# Patient Record
Sex: FEMALE | Race: WHITE | Employment: OTHER | URBAN - METROPOLITAN AREA
[De-identification: names, ages, dates, MRNs, and addresses within clinical notes are randomized per-mention and may not be internally consistent; named-entity substitution may affect disease eponyms.]

---

## 2017-03-16 ENCOUNTER — TELEPHONE (OUTPATIENT)
Dept: OTHER | Facility: CLINIC | Age: 72
End: 2017-03-16

## 2017-03-16 ENCOUNTER — OFFICE VISIT (OUTPATIENT)
Dept: FAMILY MEDICINE | Facility: CLINIC | Age: 72
End: 2017-03-16
Payer: COMMERCIAL

## 2017-03-16 VITALS
SYSTOLIC BLOOD PRESSURE: 110 MMHG | HEIGHT: 66 IN | OXYGEN SATURATION: 99 % | DIASTOLIC BLOOD PRESSURE: 70 MMHG | BODY MASS INDEX: 22.5 KG/M2 | HEART RATE: 62 BPM | WEIGHT: 140 LBS | TEMPERATURE: 98.4 F

## 2017-03-16 DIAGNOSIS — I71.40 ABDOMINAL AORTIC ANEURYSM (AAA) WITHOUT RUPTURE (H): Primary | ICD-10-CM

## 2017-03-16 DIAGNOSIS — K13.0 CRACKED LIPS: ICD-10-CM

## 2017-03-16 DIAGNOSIS — H26.9 CATARACT: ICD-10-CM

## 2017-03-16 DIAGNOSIS — Z12.31 ENCOUNTER FOR SCREENING MAMMOGRAM FOR BREAST CANCER: ICD-10-CM

## 2017-03-16 DIAGNOSIS — Z78.0 ASYMPTOMATIC POSTMENOPAUSAL STATUS: ICD-10-CM

## 2017-03-16 PROCEDURE — 99214 OFFICE O/P EST MOD 30 MIN: CPT | Performed by: FAMILY MEDICINE

## 2017-03-16 RX ORDER — DIAPER,BRIEF,INFANT-TODD,DISP
EACH MISCELLANEOUS
Qty: 25 G | Refills: 0 | Status: SHIPPED | OUTPATIENT
Start: 2017-03-16 | End: 2017-04-28

## 2017-03-16 NOTE — TELEPHONE ENCOUNTER
Scheduled consult appt for pt for Friday April 7th at 9:30am with Dr. Jerome. AAA U/S first at 9:30am then see Dr. Jerome at 10:30am. Pt is in agreement with appt and had no further questions. Mailed pt this information.

## 2017-03-16 NOTE — MR AVS SNAPSHOT
After Visit Summary   3/16/2017    Trish Wu    MRN: [id number]           Patient Information     Date Of Birth          1945        Visit Information        Provider Department      3/16/2017 10:00 AM Jermain Arcos [place] [facility] [place]        Today's Diagnoses     Abdominal aortic aneurysm (AAA) without rupture (H)    -  1    Cracked lips        Cataract        Encounter for screening mammogram for breast cancer        Asymptomatic postmenopausal status           Follow-ups after your visit        Additional Services     OPHTHALMOLOGY ADULT REFERRAL       Your provider has referred you to: N: [facility] [place] [phone] [email or web address].   Anali Haji     Please be aware that coverage of these services is subject to the terms and limitations of your health insurance plan.  Call member services at your health plan with any benefit or coverage questions.      Please bring the following with you to your appointment:    (1) Any X-Rays, CTs or MRIs which have been performed.  Contact the facility where they were done to arrange for  prior to your scheduled appointment.    (2) List of current medications  (3) This referral request   (4) Any documents/labs given to you for this referral            VASCULAR SURGERY REFERRAL       Your provider has referred you to: **Vascular  Services [phone] - Abdominal Aortic Aneurysm - Symptomatic    [email or web address]    Please be aware that coverage of these services is subject to the terms and limitations of your health insurance plan.  Call member services at your health plan with any benefit or coverage questions.      Please bring the following with you to your appointment:    (1) Any X-Rays, CTs or MRIs which have been performed.  Contact the facility where they were done to arrange for  prior to your scheduled appointment.    (2)  "List of current medications   (3) This referral request   (4) Any documents/labs given to you for this referral                  Future tests that were ordered for you today     Open Future Orders        Priority Expected Expires Ordered    MA Screen with Implants Bilateral w/Jonny Routine  3/16/2018 3/16/2017    DX Hip/Pelvis/Spine Routine  3/16/2018 3/16/2017    US Aortic Imaging Routine  3/16/2018 3/16/2017            Who to contact     If you have questions or need follow up information about today's clinic visit or your schedule please contact [facility] [place] directly at [phone].  Normal or non-critical lab and imaging results will be communicated to you by Vision Criticalhart, letter or phone within 4 business days after the clinic has received the results. If you do not hear from us within 7 days, please contact the clinic through Vision Criticalhart or phone. If you have a critical or abnormal lab result, we will notify you by phone as soon as possible.  Submit refill requests through [email or web address] or call your pharmacy and they will forward the refill request to us. Please allow 3 business days for your refill to be completed.          Additional Information About Your Visit        Vision Criticalhart Information     [email or web address] gives you secure access to your electronic health record. If you see a primary care provider, you can also send messages to your care team and make appointments. If you have questions, please call your primary care clinic.  If you do not have a primary care provider, please call [phone] and they will assist you.        Care EveryWhere ID     This is your Care EveryWhere ID. This could be used by other organizations to access your [place] medical records  [id number]        Your Vitals Were     Pulse Temperature Height Pulse Oximetry BMI (Body Mass Index)       62 98.4  F (36.9  C) (Tympanic) 5' 6\" (1.676 m) 99% 22.6 kg/m2        Blood Pressure from Last 3 Encounters:   03/16/17 110/70   08/16/16 148/82 "   08/16/16 110/78    Weight from Last 3 Encounters:   03/16/17 140 lb (63.5 kg)   08/16/16 144 lb (65.3 kg)   06/14/16 133 lb 6.4 oz (60.5 kg)              We Performed the Following     OPHTHALMOLOGY ADULT REFERRAL     VASCULAR SURGERY REFERRAL          Today's Medication Changes          These changes are accurate as of: 3/16/17 10:23 AM.  If you have any questions, ask your nurse or doctor.               Start taking these medicines.        Dose/Directions    hydrocortisone 1 % ointment   Used for:  Cracked lips   Started by:  Jermain Arcos MD        Apply sparingly to affected area three times daily as needed   Quantity:  25 g   Refills:  0         Stop taking these medicines if you haven't already. Please contact your care team if you have questions.     diclofenac 1 % Gel topical gel   Commonly known as:  VOLTAREN   Stopped by:  Jermain Arcos MD           ibuprofen 800 MG tablet   Commonly known as:  ADVIL/MOTRIN   Stopped by:  Jermain Arcos MD                Where to get your medicines      These medications were sent to [facility] [address] [place]  [address] [facility]  [address] [facility] [address] [address]    Hours:  Added 10/26 CK Checked with pharmacy Phone:  [phone]     hydrocortisone 1 % ointment                Primary Care Provider Office Phone # Fax #    Jermain Arcos [phone]       [address]  [place] [address]        Thank you!     Thank you for [place] [facility]  [place]. Our goal is always to provide you with excellent care. Hearing back from our patients is one way we can continue to improve our services. Please take a few minutes to complete the written survey that you may receive in the mail after your visit with us. Thank you!             Your Updated Medication List - Protect others around you: Learn how to safely use, store and throw away your medicines at  www.disposemymeds.org.          This list is accurate as of: 3/16/17 10:23 AM.  Always use your most recent med list.                   Brand Name Dispense Instructions for use    cetirizine 10 MG tablet    zyrTEC     Take 1 tablet (10 mg) by mouth every evening       hydrocortisone 1 % ointment     25 g    Apply sparingly to affected area three times daily as needed       lisinopril-hydrochlorothiazide 20-25 MG per tablet    PRINZIDE/ZESTORETIC    90 tablet    Take 1 tablet by mouth daily       montelukast 10 MG tablet    SINGULAIR    90 tablet    Take 1 tablet (10 mg) by mouth At Bedtime       NASONEX 50 MCG/ACT spray   Generic drug:  mometasone      Spray 2 sprays into both nostrils daily

## 2017-03-16 NOTE — TELEPHONE ENCOUNTER
Pt referred to [facility] by  for AAA.    Pt needs to be scheduled for AAA US (has been ordered by PCP already, needs to be done prior to consult with [facility]) and consult with Vascular Surgeon or IR.  Will route to scheduling to coordinate an appointment next available.    Elinor Ahston RN BSN

## 2017-03-16 NOTE — NURSING NOTE
"Chief Complaint   Patient presents with     Mouth Lesions     Referral     eye Dr     Abdominal Pain     Abdominal aortic aneurysm without rupture        Initial /70 (BP Location: Right arm, Patient Position: Chair, Cuff Size: Adult Regular)  Pulse 62  Temp 98.4  F (36.9  C) (Tympanic)  Ht 5' 6\" (1.676 m)  Wt 140 lb (63.5 kg)  SpO2 99%  BMI 22.6 kg/m2 Estimated body mass index is 22.6 kg/(m^2) as calculated from the following:    Height as of this encounter: 5' 6\" (1.676 m).    Weight as of this encounter: 140 lb (63.5 kg).  Medication Reconciliation: complete  "

## 2017-03-16 NOTE — PROGRESS NOTES
SUBJECTIVE:                                                    Trish Wu is a 72 year old female who presents to clinic today for the following health issues:      Concern - Referral for eye drPillo Patient  reports that she had an eye cataract surgery done many years ago. It's getting hazy. Needs a referral for to be seen.        Concern - cracked lips - for the last 5 months off and on. Lips gets very dry and cracked and is often bleed. Continues to moisturize it but it does not help. She thinks it got worse last month when she ate shrimp. She is not allergic to shrimp as she has had it in the past.        Concern - Abdominal aortic aneurysm -infrarenal noted at 4 cm size, August 2016. Patient worries that it might get bigger and ruptures. She reports of some lower back pain. Would like to get it looked at again and meet with the surgeon to discuss the options of management          Problem list and histories reviewed & adjusted, as indicated.  Additional history: as documented    Patient Active Problem List   Diagnosis     Essential hypertension with goal blood pressure less than 140/90     Environmental allergies     Hip pain, left     Osteopenia     Aortic aneurysm (H)     Past Surgical History   Procedure Laterality Date     Hysterectomy total abdominal  1966     Vagotomy, pyloroplasty, combined       Cataract iol, rt/lt       Mammoplasty augmentation         Social History   Substance Use Topics     Smoking status: Former Smoker     Smokeless tobacco: Never Used     Alcohol use 0.0 oz/week     0 Standard drinks or equivalent per week      Comment: rare     Family History   Problem Relation Age of Onset     OSTEOPOROSIS Mother      Type 2 Diabetes Son          Current Outpatient Prescriptions   Medication Sig Dispense Refill     hydrocortisone 1 % ointment Apply sparingly to affected area three times daily as needed 25 g 0     mometasone (NASONEX) 50 MCG/ACT nasal spray Spray 2 sprays into both nostrils  "daily       cetirizine (ZYRTEC) 10 MG tablet Take 1 tablet (10 mg) by mouth every evening       lisinopril-hydrochlorothiazide (PRINZIDE,ZESTORETIC) 20-25 MG per tablet Take 1 tablet by mouth daily 90 tablet 3     montelukast (SINGULAIR) 10 MG tablet Take 1 tablet (10 mg) by mouth At Bedtime 90 tablet 3     Allergies   Allergen Reactions     Penicillins Hives       Reviewed and updated as needed this visit by clinical staff       Reviewed and updated as needed this visit by Provider         ROS:  C: NEGATIVE for fever, chills, change in weight  E/M: NEGATIVE for ear, mouth and throat problems  R: NEGATIVE for significant cough or SOB  CV: NEGATIVE for chest pain, palpitations or peripheral edema    OBJECTIVE:                                                    /70 (BP Location: Right arm, Patient Position: Chair, Cuff Size: Adult Regular)  Pulse 62  Temp 98.4  F (36.9  C) (Tympanic)  Ht 5' 6\" (1.676 m)  Wt 140 lb (63.5 kg)  SpO2 99%  BMI 22.6 kg/m2  Body mass index is 22.6 kg/(m^2).   GENERAL: healthy, alert, well nourished, well hydrated, no distress  HENT: ear canals- normal; TMs- normal; Nose- normal; Mouth- no ulcers, no lesions  NECK: no tenderness, no adenopathy, no asymmetry, no masses, no stiffness; thyroid- normal to palpation  RESP: lungs clear to auscultation - no rales, no rhonchi, no wheezes  CV: regular rates and rhythm, normal S1 S2, no S3 or S4 and no murmur, no click or rub -  ABDOMEN: soft, no tenderness, no  hepatosplenomegaly, no masses, normal bowel sounds         ASSESSMENT/PLAN:                                                      1. Abdominal aortic aneurysm (AAA) without rupture (H)  Patient currently is symptomatic with mild low back pain which is been present for the past few months. She is hemodynamically stable. Recommended to get the carotic ultrasound done as soon as possible followed by seeing a vascular surgeon. Instructed to go to the emergency room if any symptomatic worsening " noted. Patient verbalizes understanding and agreement    - US Aortic Imaging; Future  - VASCULAR SURGERY REFERRAL    2. Cracked lips  Recommended a trial of hydrocortisone ointment and keeping her lips moisturized at all times.  - hydrocortisone 1 % ointment; Apply sparingly to affected area three times daily as needed  Dispense: 25 g; Refill: 0    3. Cataract  Referring the patient to ophthalmology  - OPHTHALMOLOGY ADULT REFERRAL    4. Encounter for screening mammogram for breast cancer  Screening mammogram ordered  - MA Screen with Implants Bilateral w/Jonny; Future    5. Asymptomatic postmenopausal status  Screening bone density scan ordered  - DX Hip/Pelvis/Spine; Future      Follow up with Provider - as needed     Jermain Arcos MD  [facility] [place]

## 2017-03-20 ENCOUNTER — HOSPITAL ENCOUNTER (OUTPATIENT)
Dept: MAMMOGRAPHY | Facility: CLINIC | Age: 72
End: 2017-03-20
Attending: FAMILY MEDICINE
Payer: COMMERCIAL

## 2017-03-20 ENCOUNTER — HOSPITAL ENCOUNTER (OUTPATIENT)
Dept: BONE DENSITY | Facility: CLINIC | Age: 72
Discharge: HOME | End: 2017-03-20
Attending: FAMILY MEDICINE | Admitting: FAMILY MEDICINE
Payer: COMMERCIAL

## 2017-03-20 DIAGNOSIS — Z12.31 ENCOUNTER FOR SCREENING MAMMOGRAM FOR BREAST CANCER: ICD-10-CM

## 2017-03-20 DIAGNOSIS — Z78.0 ASYMPTOMATIC POSTMENOPAUSAL STATUS: ICD-10-CM

## 2017-03-20 PROCEDURE — 77080 DXA BONE DENSITY AXIAL: CPT

## 2017-03-20 PROCEDURE — G0202 SCR MAMMO BI INCL CAD: HCPCS

## 2017-04-07 ENCOUNTER — OFFICE VISIT (OUTPATIENT)
Dept: OTHER | Facility: CLINIC | Age: 72
End: 2017-04-07
Attending: FAMILY MEDICINE
Payer: COMMERCIAL

## 2017-04-07 ENCOUNTER — HOSPITAL ENCOUNTER (OUTPATIENT)
Dept: ULTRASOUND IMAGING | Facility: CLINIC | Age: 72
Discharge: HOME | End: 2017-04-07
Attending: FAMILY MEDICINE | Admitting: FAMILY MEDICINE
Payer: COMMERCIAL

## 2017-04-07 VITALS — HEART RATE: 54 BPM | DIASTOLIC BLOOD PRESSURE: 61 MMHG | SYSTOLIC BLOOD PRESSURE: 125 MMHG

## 2017-04-07 DIAGNOSIS — I71.40 ABDOMINAL AORTIC ANEURYSM (AAA) WITHOUT RUPTURE (H): Primary | ICD-10-CM

## 2017-04-07 DIAGNOSIS — I71.40 ABDOMINAL AORTIC ANEURYSM (AAA) WITHOUT RUPTURE (H): ICD-10-CM

## 2017-04-07 PROCEDURE — 99211 OFF/OP EST MAY X REQ PHY/QHP: CPT

## 2017-04-07 PROCEDURE — 76775 US EXAM ABDO BACK WALL LIM: CPT

## 2017-04-07 PROCEDURE — 99204 OFFICE O/P NEW MOD 45 MIN: CPT | Mod: ZP | Performed by: SURGERY

## 2017-04-07 NOTE — PROGRESS NOTES
71 y/o female with AAA maximum diameter 4.3 cm on U/S.  Asymptomatic.  Long discussion with regard to etiology, operative repair, risks, goals etc..  Palpable pedal pulses bilaterally, carotids-4/4 no bruits, small AAA just left of midline.  Plan at this point is check a CTA in one year to monitor growth as well as evaluate for a endovascular repair.  She spends 6 months in [place] every year and is several hours away from medical care.  I would anticipate repair as she approaches 5.0 cm because of this.  Discussed all in detail with the patient she appears to uiderstand and wishes to proceed in this way.  Face to face time 45 minutes with greater than 50% in consultation.

## 2017-04-07 NOTE — MR AVS SNAPSHOT
After Visit Summary   4/7/2017    Trish Wu    MRN: [id number]           Patient Information     Date Of Birth          1945        Visit Information        Provider Department      4/7/2017 10:30 AM Ulisses Meraz MD [facility]      Today's Diagnoses     Abdominal aortic aneurysm (AAA) without rupture (H)    -  1       Follow-ups after your visit        Who to contact     If you have questions or need follow up information about today's clinic visit or your schedule please contact [facility] directly at [phone].  Normal or non-critical lab and imaging results will be communicated to you by MyChart, letter or phone within 4 business days after the clinic has received the results. If you do not hear from us within 7 days, please contact the clinic through XimoXihart or phone. If you have a critical or abnormal lab result, we will notify you by phone as soon as possible.  Submit refill requests through IntelliMat or call your pharmacy and they will forward the refill request to us. Please allow 3 business days for your refill to be completed.          Additional Information About Your Visit        MyChart Information     IntelliMat gives you secure access to your electronic health record. If you see a primary care provider, you can also send messages to your care team and make appointments. If you have questions, please call your primary care clinic.  If you do not have a primary care provider, please call [phone] and they will assist you.        Care EveryWhere ID     This is your Care EveryWhere ID. This could be used by other organizations to access your [place] medical records  [id number]        Your Vitals Were     Pulse Breastfeeding?                54 No           Blood Pressure from Last 3 Encounters:   04/07/17 125/61   03/16/17 110/70   08/16/16 148/82    Weight from Last 3  Encounters:   03/16/17 140 lb (63.5 kg)   08/16/16 144 lb (65.3 kg)   06/14/16 133 lb 6.4 oz (60.5 kg)              Today, you had the following     No orders found for display       Primary Care Provider Office Phone # Fax #    Jermain Arcos [phone]       [facility] [place] [address]  [place] [address]        Thank you!     Thank you for choosing [facility] [place]  for your care. Our goal is always to provide you with excellent care. Hearing back from our patients is one way we can continue to improve our services. Please take a few minutes to complete the written survey that you may receive in the mail after your visit with us. Thank you!             Your Updated Medication List - Protect others around you: Learn how to safely use, store and throw away your medicines at www.disposemymeds.org.          This list is accurate as of: 4/7/17 11:02 AM.  Always use your most recent med list.                   Brand Name Dispense Instructions for use    cetirizine 10 MG tablet    zyrTEC     Take 1 tablet (10 mg) by mouth every evening       hydrocortisone 1 % ointment     25 g    Apply sparingly to affected area three times daily as needed       lisinopril-hydrochlorothiazide 20-25 MG per tablet    PRINZIDE/ZESTORETIC    90 tablet    Take 1 tablet by mouth daily       montelukast 10 MG tablet    SINGULAIR    90 tablet    Take 1 tablet (10 mg) by mouth At Bedtime       NASONEX 50 MCG/ACT spray   Generic drug:  mometasone      Spray 2 sprays into both nostrils daily

## 2017-04-07 NOTE — NURSING NOTE
"Chief Complaint   Patient presents with     RECHECK     AAA (9:30 [facility] 10:30 S)*New consult for AAA referred by Dr. Arcos, CT & records in Epic       Initial /61 (BP Location: Right arm, Patient Position: Chair, Cuff Size: Adult Regular)  Pulse 54  Breastfeeding? No Estimated body mass index is 22.6 kg/(m^2) as calculated from the following:    Height as of 3/16/17: 5' 6\" (1.676 m).    Weight as of 3/16/17: 140 lb (63.5 kg).  Medication Reconciliation: complete     Face to face nursing time: 8 minutes    Krystal Weinstein MA     "

## 2017-04-14 ENCOUNTER — OFFICE VISIT (OUTPATIENT)
Dept: FAMILY MEDICINE | Facility: CLINIC | Age: 72
End: 2017-04-14
Payer: COMMERCIAL

## 2017-04-14 VITALS
HEART RATE: 66 BPM | TEMPERATURE: 97.4 F | HEIGHT: 66 IN | WEIGHT: 140.4 LBS | BODY MASS INDEX: 22.56 KG/M2 | DIASTOLIC BLOOD PRESSURE: 72 MMHG | SYSTOLIC BLOOD PRESSURE: 133 MMHG | OXYGEN SATURATION: 99 %

## 2017-04-14 DIAGNOSIS — M25.552 HIP PAIN, LEFT: ICD-10-CM

## 2017-04-14 DIAGNOSIS — K13.0 CRACKED LIPS: Primary | ICD-10-CM

## 2017-04-14 PROCEDURE — 99213 OFFICE O/P EST LOW 20 MIN: CPT | Performed by: FAMILY MEDICINE

## 2017-04-14 RX ORDER — MUPIROCIN 20 MG/G
OINTMENT TOPICAL 3 TIMES DAILY
Qty: 22 G | Refills: 0 | Status: SHIPPED | OUTPATIENT
Start: 2017-04-14 | End: 2017-04-21

## 2017-04-14 NOTE — PROGRESS NOTES
SUBJECTIVE:                                                    Trish Wu is a 72 year old female who presents to clinic today for the following health issues:      Concern - blisters on lips top and bottom     Onset: 5 months     Description:   Blisters on lips top and bottom. Painful     Intensity: moderate    Progression of Symptoms:  worsening and constant    Accompanying Signs & Symptoms:  none       Previous history of similar problem:   none    Precipitating factors:   Worsened by: none    Alleviating factors:  Improved by: none       Therapies Tried and outcome: steroid ointment- no improvement.         Problem list and histories reviewed & adjusted, as indicated.  Additional history: as documented    Patient Active Problem List   Diagnosis     Essential hypertension with goal blood pressure less than 140/90     Environmental allergies     Hip pain, left     Osteopenia     Aortic aneurysm (H)     Past Surgical History:   Procedure Laterality Date     CATARACT IOL, RT/LT       HYSTERECTOMY TOTAL ABDOMINAL  1966     MAMMOPLASTY AUGMENTATION       VAGOTOMY, PYLOROPLASTY, COMBINED         Social History   Substance Use Topics     Smoking status: Former Smoker     Smokeless tobacco: Never Used     Alcohol use 0.0 oz/week     0 Standard drinks or equivalent per week      Comment: rare     Family History   Problem Relation Age of Onset     OSTEOPOROSIS Mother      Type 2 Diabetes Son          Current Outpatient Prescriptions   Medication Sig Dispense Refill     mupirocin (BACTROBAN) 2 % ointment Apply topically 3 times daily for 7 days 22 g 0     hydrocortisone 1 % ointment Apply sparingly to affected area three times daily as needed 25 g 0     mometasone (NASONEX) 50 MCG/ACT nasal spray Spray 2 sprays into both nostrils daily       cetirizine (ZYRTEC) 10 MG tablet Take 1 tablet (10 mg) by mouth every evening       lisinopril-hydrochlorothiazide (PRINZIDE,ZESTORETIC) 20-25 MG per tablet Take 1 tablet by mouth  "daily 90 tablet 3     montelukast (SINGULAIR) 10 MG tablet Take 1 tablet (10 mg) by mouth At Bedtime 90 tablet 3     Allergies   Allergen Reactions     Penicillins Hives       Reviewed and updated as needed this visit by clinical staff       Reviewed and updated as needed this visit by Provider         ROS:  C: NEGATIVE for fever, chills, change in weight  R: NEGATIVE for significant cough or SOB  CV: NEGATIVE for chest pain, palpitations or peripheral edema  MS: c/o left hip pain. Would like ortho referral.     OBJECTIVE:                                                    /72 (BP Location: Left arm, Patient Position: Chair, Cuff Size: Adult Regular)  Pulse 66  Temp 97.4  F (36.3  C) (Tympanic)  Ht 5' 6\" (1.676 m)  Wt 140 lb 6.4 oz (63.7 kg)  SpO2 99%  BMI 22.66 kg/m2  Body mass index is 22.66 kg/(m^2).   GENERAL: healthy, alert, well nourished, well hydrated, no distress  HENT: ear canals- normal; TMs- normal; Nose- normal; Mouth- no ulcers, no lesions. Both upper and lower lips with cracks and mild swelling and erythema. No drainage or bleeding. No surrounding skin involvement.   NECK: no tenderness, no adenopathy, no asymmetry, no masses, no stiffness; thyroid- normal to palpation  RESP: lungs clear to auscultation - no rales, no rhonchi, no wheezes  CV: regular rates and rhythm, normal S1 S2, no S3 or S4 and no murmur, no click or rub -         ASSESSMENT/PLAN:                                                      1. Cracked lips  Topical steroids did not help. Recommended to empirically start using Bactroban. If no improvement noted in a few days, may use antifungal medication, as I cannot rule it out either or have her see dermatology. Patient verbalizes agreement.   - mupirocin (BACTROBAN) 2 % ointment; Apply topically 3 times daily for 7 days  Dispense: 22 g; Refill: 0    2. Hip pain, left  Ortho referral given per patients request.   - ORTHO  REFERRAL      Follow up with Provider - as " needed     Jermain Arcos MD  [facility] [place]

## 2017-04-14 NOTE — NURSING NOTE
"Chief Complaint   Patient presents with     Mouth/Lip Problem       Initial There were no vitals taken for this visit. Estimated body mass index is 22.6 kg/(m^2) as calculated from the following:    Height as of 3/16/17: 5' 6\" (1.676 m).    Weight as of 3/16/17: 140 lb (63.5 kg).  Medication Reconciliation: complete   Lai Medrano CMA    "

## 2017-04-14 NOTE — MR AVS SNAPSHOT
After Visit Summary   4/14/2017    Trish Wu    MRN: [id number]           Patient Information     Date Of Birth          1945        Visit Information        Provider Department      4/14/2017 3:00 PM Jermain Arcos MD [facility] [place]        Today's Diagnoses     Cracked lips    -  1    Hip pain, left           Follow-ups after your visit        Additional Services     ORTHO  REFERRAL       [facility] is referring you to the Orthopedic  Services at [facility].       The  Representative will assist you in the coordination of your Orthopedic and Musculoskeletal Care as prescribed by your physician.    The  Representative will call you within 1 business day to help schedule your appointment, or you may contact the  Representative at:    All areas ~ [phone]     Type of Referral : Non Surgical       Timeframe requested: 3 - 5 days    Coverage of these services is subject to the terms and limitations of your health insurance plan.  Please call member services at your health plan with any benefit or coverage questions.      If X-rays, CT or MRI's have been performed, please contact the facility where they were done to arrange for , prior to your scheduled appointment.  Please bring this referral request to your appointment and present it to your specialist.                  Who to contact     If you have questions or need follow up information about today's clinic visit or your schedule please contact [facility] [place] directly at [phone].  Normal or non-critical lab and imaging results will be communicated to you by MyChart, letter or phone within 4 business days after the clinic has received the results. If you do not hear from us within 7 days, please contact the clinic through MyChart or phone. If you have a critical or abnormal lab result, we will notify you by phone  "as soon as possible.  Submit refill requests through Stypi or call your pharmacy and they will forward the refill request to us. Please allow 3 business days for your refill to be completed.          Additional Information About Your Visit        [employer]oneless Information     Stypi gives you secure access to your electronic health record. If you see a primary care provider, you can also send messages to your care team and make appointments. If you have questions, please call your primary care clinic.  If you do not have a primary care provider, please call [phone] and they will assist you.        Care EveryWhere ID     This is your Care EveryWhere ID. This could be used by other organizations to access your [place] medical records  [id number]        Your Vitals Were     Pulse Temperature Height Pulse Oximetry BMI (Body Mass Index)       66 97.4  F (36.3  C) (Tympanic) 5' 6\" (1.676 m) 99% 22.66 kg/m2        Blood Pressure from Last 3 Encounters:   04/14/17 133/72   04/07/17 125/61   03/16/17 110/70    Weight from Last 3 Encounters:   04/14/17 140 lb 6.4 oz (63.7 kg)   03/16/17 140 lb (63.5 kg)   08/16/16 144 lb (65.3 kg)              We Performed the Following     ORTHO  REFERRAL          Today's Medication Changes          These changes are accurate as of: 4/14/17  3:29 PM.  If you have any questions, ask your nurse or doctor.               Start taking these medicines.        Dose/Directions    mupirocin 2 % ointment   Commonly known as:  BACTROBAN   Used for:  Cracked lips   Started by:  Jermain Arcos MD        Apply topically 3 times daily for 7 days   Quantity:  22 g   Refills:  0            Where to get your medicines      These medications were sent to [facility] [id number]  [place] - [address] [facility]  [address] [facility] [place] [address]    Hours:  Added 10/26 CK Checked with pharmacy Phone:  [phone]     mupirocin 2 % ointment                Primary Care Provider " Office Phone # Fax #    Jermain Arcos [phone]       [facility] [place] [address] [place] [address] DR  [place] [address]        Thank you!     Thank you for choosing [facility]  for your care. Our goal is always to provide you with excellent care. Hearing back from our patients is one way we can continue to improve our services. Please take a few minutes to complete the written survey that you may receive in the mail after your visit with us. Thank you!             Your Updated Medication List - Protect others around you: Learn how to safely use, store and throw away your medicines at www.disposemymeds.org.          This list is accurate as of: 4/14/17  3:29 PM.  Always use your most recent med list.                   Brand Name Dispense Instructions for use    cetirizine 10 MG tablet    zyrTEC     Take 1 tablet (10 mg) by mouth every evening       hydrocortisone 1 % ointment     25 g    Apply sparingly to affected area three times daily as needed       lisinopril-hydrochlorothiazide 20-25 MG per tablet    PRINZIDE/ZESTORETIC    90 tablet    Take 1 tablet by mouth daily       montelukast 10 MG tablet    SINGULAIR    90 tablet    Take 1 tablet (10 mg) by mouth At Bedtime       mupirocin 2 % ointment    BACTROBAN    22 g    Apply topically 3 times daily for 7 days       NASONEX 50 MCG/ACT spray   Generic drug:  mometasone      Spray 2 sprays into both nostrils daily

## 2017-04-20 ENCOUNTER — OFFICE VISIT (OUTPATIENT)
Dept: ORTHOPEDICS | Facility: CLINIC | Age: 72
End: 2017-04-20
Payer: COMMERCIAL

## 2017-04-20 ENCOUNTER — RADIANT APPOINTMENT (OUTPATIENT)
Dept: GENERAL RADIOLOGY | Facility: CLINIC | Age: 72
End: 2017-04-20
Attending: FAMILY MEDICINE
Payer: COMMERCIAL

## 2017-04-20 VITALS
WEIGHT: 140 LBS | SYSTOLIC BLOOD PRESSURE: 122 MMHG | BODY MASS INDEX: 22.5 KG/M2 | HEIGHT: 66 IN | DIASTOLIC BLOOD PRESSURE: 78 MMHG

## 2017-04-20 DIAGNOSIS — M25.552 HIP PAIN, LEFT: Primary | ICD-10-CM

## 2017-04-20 DIAGNOSIS — M25.552 HIP PAIN, LEFT: ICD-10-CM

## 2017-04-20 DIAGNOSIS — M85.80 OSTEOPENIA: ICD-10-CM

## 2017-04-20 DIAGNOSIS — S32.000S LUMBAR COMPRESSION FRACTURE, SEQUELA: ICD-10-CM

## 2017-04-20 PROCEDURE — 73502 X-RAY EXAM HIP UNI 2-3 VIEWS: CPT

## 2017-04-20 PROCEDURE — 99214 OFFICE O/P EST MOD 30 MIN: CPT | Performed by: FAMILY MEDICINE

## 2017-04-20 ASSESSMENT — ENCOUNTER SYMPTOMS
FOCAL WEAKNESS: 0
SENSORY CHANGE: 1
TINGLING: 0
BACK PAIN: 1

## 2017-04-20 NOTE — Clinical Note
Here is an update on your patient that was seen at [facility] in [place].   Please let us know if you have any questions.

## 2017-04-20 NOTE — PROGRESS NOTES
CHARITO   [facility]   Clinic Visit s Apr 20, 2017    PCP: Jermain Arcos      Trish is a 72 year old female who is seen in consultation at the request of Dr. Arcos for   Chief Complaint   Patient presents with     Musculoskeletal Problem     L posterior hip and lower back pain     Injury: Patient reports insidious onset without acute precipitating event.    Location of Pain: bilateral lower back and left posterior hip  Duration of Pain: 2 year(s)  Rating of Pain at worst: 10/10  Rating of Pain Currently: 2/10  Pain is worse with: walking more than a block, walking on uneven surfaces  Pain is better with: sitting  Treatment so far consists of: rest/activity avoidance and ibuprofen  Associated symptoms: numbness in left leg for the past year comes on with prolonged standing  Prior History of related problems: fall and fractured lower back in 2005    Pain worse after walking 1/4 mile.    Feels better using grocery cart in store. No RLE symptoms.     Low back pain also, not as bad as LEFT hip.    Social History: retired     Past Medical History:   Diagnosis Date     Aortic aneurysm (H)      Environmental allergies 6/14/2016     Essential hypertension with goal blood pressure less than 140/90 6/14/2016     Hip pain, left      Osteopenia        Patient Active Problem List    Diagnosis Date Noted     Aortic aneurysm (H)      Priority: Medium     Essential hypertension with goal blood pressure less than 140/90 06/14/2016     Priority: Medium     Environmental allergies 06/14/2016     Priority: Medium     Hip pain, left      Priority: Medium     Osteopenia      Priority: Medium       Family History   Problem Relation Age of Onset     OSTEOPOROSIS Mother      Type 2 Diabetes Son        Social History     Social History     Marital status:      Spouse name: N/A     Number of children: N/A     Years of education: N/A     Social History Main Topics     Smoking status: Former Smoker     Smokeless  "tobacco: Never Used     Alcohol use 0.0 oz/week     0 Standard drinks or equivalent per week      Comment: rare     Past Surgical History:   Procedure Laterality Date     CATARACT IOL, RT/LT       HYSTERECTOMY TOTAL ABDOMINAL  1966     MAMMOPLASTY AUGMENTATION       VAGOTOMY, PYLOROPLASTY, COMBINED         Review of Systems   Musculoskeletal: Positive for back pain and joint pain.   Neurological: Positive for sensory change. Negative for tingling and focal weakness.   All other systems reviewed and are negative.        Physical Exam  /78 (BP Location: Left arm, Patient Position: Chair, Cuff Size: Adult Regular)  Ht 5' 6\" (1.676 m)  Wt 140 lb (63.5 kg)  BMI 22.6 kg/m2  Constitutional:well-developed, well-nourished, and in no distress.   Cardiovascular: Intact distal pulses.    Neurological: alert. Gait Normal:   Gait, station, stance, and balance appear normal for age  Skin: Skin is warm and dry.   Psychiatric: Mood and affect normal.   Respiratory: unlabored, speaks in full sentences  Lymph: no LAD, no lymphangitis      Left Hip Exam   Gait: Normal.    Tenderness   The patient is experiencing tenderness in the ischial tuberosity, lateral.    Range of Motion   Extension:            Normal  Flexion:                 Abnormal  Internal Rotation:  20  External Rotation: 30  Abduction:            Normal  Adduction:            Normal    Muscle Strength   Abduction:  5/5  Adduction:  5/5  Flexion:      5/5    Tests   Ying: Positive  Romy:  N/t    Right Hip Exam   Gait: Normal.    Tenderness   None    Range of Motion   Extension:            Normal  Flexion:                 Normal  Internal Rotation:  20  External Rotation: Normal  Abduction:            Normal  Adduction:            Normal    Muscle Strength   Abduction:  5/5  Adduction:  5/5  Flexion:      5/5    Tests   Ying: Negative  Romy:  N/t          DX HIP/PELVIS/SPINE 3/20/2017 1:44 PM     HISTORY: Asymptomatic menopausal state  T-SCORES:  Lumbar Spine " L1-L4 T-score: -1.6     Left Hip (Neck) T-score: -2.1  Right Hip (Total) T-score: -1.9     Hip lowest neck BMD: 0.742 gm/cm2.     FRAX 10-YEAR PROBABILITY OF FRACTURE*:  Major Osteoporotic: 44%  Hip: 21%     *All treatment decisions require clinical judgment and consideration  of individual patient factors which may not be captured in the FRAX  model and the risk of fracture may be over- or under-estimated by  FRAX.  IMPRESSION: Lumbar spine and bilateral hip osteopenia.  ARIES MCADAMS MD      LUMBAR SPINE TWO TO THREE VIEWS August 16, 2016 1:46 PM  IMPRESSION: Moderate compression fracture of T12 and mild compression  fracture of L3, age indeterminate. Degenerative changes at L5-S1 with  at least moderate disc space narrowing and endplate sclerosis.  Posterior alignment satisfactory. Atherosclerotic calcification of the  aorta and proximal iliac vessels. 4.9 cm distal aortic aneurysm.  Smoothly lobulated prominent bulge of the posterior diaphragmatic  region on the lateral view, right versus left not known. This could  represent a prominent Bochdalek hernia or diaphragmatic hernia,  possibly eventration of the diaphragm.     Discussed with Dr. Arcos. The low back pain is chronic. Known vertebral  body compression fractures.  DANA MEDEIROS MD        XR PELVIS 1/2 VW 6/14/2016 3:16 PM  HISTORY: Pain in left hip   IMPRESSION: Demineralized bones. Exam otherwise negative.  SAMRA STORM MD      X-ray images Ordered and independently reviewed by me in the office today with the patient. X-ray shows: no acute fracture  Recent Results (from the past 48 hour(s))   XR Pelvis and Hip Left 1 View    Narrative    PELVIS AND HIP LEFT ONE VIEW April 20, 2017 10:27 AM     HISTORY: Pain in left hip.      Impression    IMPRESSION: Left hip joint space width appears within normal limits  and symmetrical with the right. The osseous pelvis appears intact.  Aortic and iliac artery calcifications are noted.    ARIES MCADAMS MD          ASSESSMENT/PLAN    ICD-10-CM    1. Hip pain, left M25.552 XR Pelvis and Hip Left 1 View     MR Hip Left w/o Contrast   2. Osteopenia M85.80 MR Hip Left w/o Contrast   3. Lumbar compression fracture, sequela S32.000S      Overlapping symptoms of LEFT hip pain and possible lumbar central stenosis. LEFT hip pain in context of marked osteopenia, and hx of compression fx, concerning for possible insufficiency fx. Will proceed with MRI of hip, if normal may consider MRI lumbar spine or PHYSICAL THERAPY for strengthening. Pt eager to proceed     MRI ordered, Trish instructed to return at least 2 days following MRI to review results and determine treatment plan

## 2017-04-20 NOTE — PATIENT INSTRUCTIONS
Assessment:  1. Hip pain, left    2. Osteopenia    3. Lumbar compression fracture, sequela        Plan:  MRI of the left hip    Please call [facility] [phone] to schedule your appointment if you have not heard from them in two business days  Arrive 15 minutes early.  If you need to cancel or change the appointment please call [facility] [phone]  Please follow up in clinic 2 business days following the MRI / MRI Arthrogram

## 2017-04-20 NOTE — MR AVS SNAPSHOT
After Visit Summary   4/20/2017    Trish Wu    MRN: [id number]           Patient Information     Date Of Birth          1945        Visit Information        Provider Department      4/20/2017 10:00 AM Kavin Eddy MD [facility]        Today's Diagnoses     Hip pain, left    -  1    Osteopenia        Lumbar compression fracture, sequela          Care Instructions    Assessment:  1. Hip pain, left    2. Osteopenia    3. Lumbar compression fracture, sequela        Plan:  MRI of the left hip    Please call [facility] [facility] [phone] to schedule your appointment if you have not heard from them in two business days  Arrive 15 minutes early.  If you need to cancel or change the appointment please call [facility] [phone]  Please follow up in clinic 2 business days following the MRI / MRI Arthrogram            Follow-ups after your visit        Follow-up notes from your care team     Return in about 1 week (around 4/27/2017).      Future tests that were ordered for you today     Open Future Orders        Priority Expected Expires Ordered    MR Hip Left w/o Contrast Routine  4/20/2018 4/20/2017            Who to contact     If you have questions or need follow up information about today's clinic visit or your schedule please contact [facility] directly at [phone].  Normal or non-critical lab and imaging results will be communicated to you by MyChart, letter or phone within 4 business days after the clinic has received the results. If you do not hear from us within 7 days, please contact the clinic through MyChart or phone. If you have a critical or abnormal lab result, we will notify you by phone as soon as possible.  Submit refill requests through britebill or call your pharmacy and they will forward the refill request to us. Please allow 3 business days for your refill to be completed.           "Additional Information About Your Visit        MyChart Information     uiu gives you secure access to your electronic health record. If you see a primary care provider, you can also send messages to your care team and make appointments. If you have questions, please call your primary care clinic.  If you do not have a primary care provider, please call [phone] and they will assist you.        Care EveryWhere ID     This is your Care EveryWhere ID. This could be used by other organizations to access your [place] medical records  [id number]        Your Vitals Were     Height BMI (Body Mass Index)                5' 6\" (1.676 m) 22.6 kg/m2           Blood Pressure from Last 3 Encounters:   04/20/17 122/78   04/14/17 133/72   04/07/17 125/61    Weight from Last 3 Encounters:   04/20/17 140 lb (63.5 kg)   04/14/17 140 lb 6.4 oz (63.7 kg)   03/16/17 140 lb (63.5 kg)               Primary Care Provider Office Phone # Fax #    Jermain Arcos [phone]       [facility] [place] [address] [place] [address] DR  [place] [address]        Thank you!     Thank you for choosing [facility]  for your care. Our goal is always to provide you with excellent care. Hearing back from our patients is one way we can continue to improve our services. Please take a few minutes to complete the written survey that you may receive in the mail after your visit with us. Thank you!             Your Updated Medication List - Protect others around you: Learn how to safely use, store and throw away your medicines at www.disposemymeds.org.          This list is accurate as of: 4/20/17  9:34 PM.  Always use your most recent med list.                   Brand Name Dispense Instructions for use    cetirizine 10 MG tablet    zyrTEC     Take 1 tablet (10 mg) by mouth every evening       hydrocortisone 1 % ointment     25 g    Apply sparingly to affected area three times daily as " needed       lisinopril-hydrochlorothiazide 20-25 MG per tablet    PRINZIDE/ZESTORETIC    90 tablet    Take 1 tablet by mouth daily       montelukast 10 MG tablet    SINGULAIR    90 tablet    Take 1 tablet (10 mg) by mouth At Bedtime       mupirocin 2 % ointment    BACTROBAN    22 g    Apply topically 3 times daily for 7 days       NASONEX 50 MCG/ACT spray   Generic drug:  mometasone      Spray 2 sprays into both nostrils daily

## 2017-04-28 ENCOUNTER — OFFICE VISIT (OUTPATIENT)
Dept: FAMILY MEDICINE | Facility: CLINIC | Age: 72
End: 2017-04-28
Payer: COMMERCIAL

## 2017-04-28 VITALS
OXYGEN SATURATION: 99 % | SYSTOLIC BLOOD PRESSURE: 138 MMHG | HEIGHT: 66 IN | WEIGHT: 142.85 LBS | BODY MASS INDEX: 22.96 KG/M2 | TEMPERATURE: 97.2 F | DIASTOLIC BLOOD PRESSURE: 86 MMHG | HEART RATE: 57 BPM

## 2017-04-28 DIAGNOSIS — K13.0 CHAPPED LIPS: Primary | ICD-10-CM

## 2017-04-28 PROCEDURE — 99213 OFFICE O/P EST LOW 20 MIN: CPT | Performed by: FAMILY MEDICINE

## 2017-04-28 RX ORDER — NYSTATIN 100000 U/G
OINTMENT TOPICAL 3 TIMES DAILY
Qty: 30 G | Refills: 0 | Status: SHIPPED | OUTPATIENT
Start: 2017-04-28 | End: 2017-05-12

## 2017-04-28 NOTE — NURSING NOTE
"Chief Complaint   Patient presents with     Mouth/Lip Problem       Initial /86 (BP Location: Right arm, Cuff Size: Adult Regular)  Pulse 57  Temp 97.2  F (36.2  C) (Tympanic)  Ht 5' 6\" (1.676 m)  Wt 142 lb 13.6 oz (64.8 kg)  SpO2 99%  BMI 23.06 kg/m2 Estimated body mass index is 23.06 kg/(m^2) as calculated from the following:    Height as of this encounter: 5' 6\" (1.676 m).    Weight as of this encounter: 142 lb 13.6 oz (64.8 kg).  Medication Reconciliation: complete  "

## 2017-04-28 NOTE — MR AVS SNAPSHOT
"              After Visit Summary   4/28/2017    Trish Wu    MRN: [id number]           Patient Information     Date Of Birth          1945        Visit Information        Provider Department      4/28/2017 11:00 AM Jermain Arcos MD [facility] [place]        Today's Diagnoses     Chapped lips    -  1       Follow-ups after your visit        Who to contact     If you have questions or need follow up information about today's clinic visit or your schedule please contact [facility] [place] directly at [phone].  Normal or non-critical lab and imaging results will be communicated to you by Lab21hart, letter or phone within 4 business days after the clinic has received the results. If you do not hear from us within 7 days, please contact the clinic through Cynvect or phone. If you have a critical or abnormal lab result, we will notify you by phone as soon as possible.  Submit refill requests through Sprio or call your pharmacy and they will forward the refill request to us. Please allow 3 business days for your refill to be completed.          Additional Information About Your Visit        MyChart Information     Sprio gives you secure access to your electronic health record. If you see a primary care provider, you can also send messages to your care team and make appointments. If you have questions, please call your primary care clinic.  If you do not have a primary care provider, please call [phone] and they will assist you.        Care EveryWhere ID     This is your Care EveryWhere ID. This could be used by other organizations to access your [place] medical records  [id number]        Your Vitals Were     Pulse Temperature Height Pulse Oximetry BMI (Body Mass Index)       57 97.2  F (36.2  C) (Tympanic) 5' 6\" (1.676 m) 99% 23.06 kg/m2        Blood Pressure from Last 3 Encounters:   04/28/17 138/86   04/20/17 122/78   04/14/17 133/72    Weight from Last 3 Encounters: "   04/28/17 142 lb 13.6 oz (64.8 kg)   04/20/17 140 lb (63.5 kg)   04/14/17 140 lb 6.4 oz (63.7 kg)              Today, you had the following     No orders found for display         Today's Medication Changes          These changes are accurate as of: 4/28/17 11:59 PM.  If you have any questions, ask your nurse or doctor.               Start taking these medicines.        Dose/Directions    nystatin ointment   Commonly known as:  MYCOSTATIN   Used for:  Chapped lips   Started by:  Jermain Arcos MD        Apply topically 3 times daily for 14 days   Quantity:  30 g   Refills:  0         Stop taking these medicines if you haven't already. Please contact your care team if you have questions.     hydrocortisone 1 % ointment   Stopped by:  Jermain Arcos MD                Where to get your medicines      These medications were sent to [facility] [place] [facility] [address] [facility]  [address] [facility] [place] [address]    Hours:  Added 10/26 CK Checked with pharmacy Phone:  [phone]     nystatin ointment                Primary Care Provider Office Phone # Fax #    Jermain Arcos [phone]       [address]  [place] [address]        Thank you!     Thank you for choosing [facility]  for your care. Our goal is always to provide you with excellent care. Hearing back from our patients is one way we can continue to improve our services. Please take a few minutes to complete the written survey that you may receive in the mail after your visit with us. Thank you!             Your Updated Medication List - Protect others around you: Learn how to safely use, store and throw away your medicines at www.disposemymeds.org.          This list is accurate as of: 4/28/17 11:59 PM.  Always use your most recent med list.                   Brand Name Dispense Instructions for use    cetirizine 10 MG tablet    zyrTEC     Take 1 tablet  (10 mg) by mouth every evening       lisinopril-hydrochlorothiazide 20-25 MG per tablet    PRINZIDE/ZESTORETIC    90 tablet    Take 1 tablet by mouth daily       montelukast 10 MG tablet    SINGULAIR    90 tablet    Take 1 tablet (10 mg) by mouth At Bedtime       NASONEX 50 MCG/ACT spray   Generic drug:  mometasone      Spray 2 sprays into both nostrils daily       nystatin ointment    MYCOSTATIN    30 g    Apply topically 3 times daily for 14 days

## 2017-04-28 NOTE — PROGRESS NOTES
SUBJECTIVE:                                                    Trish Wu is a 72 year old female who presents to clinic today for the following health issues:      Concern - sores on lips top and bottom     Onset: 6 months     Description:   sores on lips top and bottom. Painful     Intensity: moderate    Progression of Symptoms: worsening and constant    Accompanying Signs & Symptoms:  none      Previous history of similar problem:   none    Precipitating factors:   Worsened by: none    Alleviating factors:  Improved by: none     Therapies Tried and outcome: steroid ointment, and  Recently antibiotic cream- no improvement.   Would like to try antifungal cream. Does not want to see dermatology yet.         Problem list and histories reviewed & adjusted, as indicated.  Additional history: as documented    Patient Active Problem List   Diagnosis     Essential hypertension with goal blood pressure less than 140/90     Environmental allergies     Hip pain, left     Osteopenia     Aortic aneurysm (H)     Past Surgical History:   Procedure Laterality Date     CATARACT IOL, RT/LT       HYSTERECTOMY TOTAL ABDOMINAL  1966     MAMMOPLASTY AUGMENTATION       VAGOTOMY, PYLOROPLASTY, COMBINED         Social History   Substance Use Topics     Smoking status: Former Smoker     Smokeless tobacco: Never Used     Alcohol use 0.0 oz/week     0 Standard drinks or equivalent per week      Comment: rare     Family History   Problem Relation Age of Onset     OSTEOPOROSIS Mother      Type 2 Diabetes Son          Current Outpatient Prescriptions   Medication Sig Dispense Refill     nystatin (MYCOSTATIN) ointment Apply topically 3 times daily for 14 days 30 g 0     mometasone (NASONEX) 50 MCG/ACT nasal spray Spray 2 sprays into both nostrils daily       cetirizine (ZYRTEC) 10 MG tablet Take 1 tablet (10 mg) by mouth every evening       lisinopril-hydrochlorothiazide (PRINZIDE,ZESTORETIC) 20-25 MG per tablet Take 1 tablet by mouth  "daily 90 tablet 3     montelukast (SINGULAIR) 10 MG tablet Take 1 tablet (10 mg) by mouth At Bedtime 90 tablet 3     Allergies   Allergen Reactions     Penicillins Hives       Reviewed and updated as needed this visit by clinical staff       Reviewed and updated as needed this visit by Provider         ROS:  C: NEGATIVE for fever, chills, change in weight  INTEGUMENTARY/SKIN: NEGATIVE for worrisome rashes, moles or lesions  R: NEGATIVE for significant cough or SOB  CV: NEGATIVE for chest pain, palpitations or peripheral edema    OBJECTIVE:                                                    /86 (BP Location: Right arm, Cuff Size: Adult Regular)  Pulse 57  Temp 97.2  F (36.2  C) (Tympanic)  Ht 5' 6\" (1.676 m)  Wt 142 lb 13.6 oz (64.8 kg)  SpO2 99%  BMI 23.06 kg/m2  Body mass index is 23.06 kg/(m^2).   GENERAL: healthy, alert, well nourished, well hydrated, no distress  HENT: ear canals- normal; TMs- normal; Nose- normal;   Lips: no blisters noted. Lower and upper lips with erythema and peeling. At the angles of the mouth, there is white discoloration   NECK: no tenderness, no adenopathy, no asymmetry, no masses, no stiffness; thyroid- normal to palpation  RESP: lungs clear to auscultation - no rales, no rhonchi, no wheezes  CV: regular rates and rhythm, normal S1 S2, no S3 or S4 and no murmur, no click or rub -         ASSESSMENT/PLAN:                                                        ICD-10-CM    1. Chapped lips K13.0 nystatin (MYCOSTATIN) ointment     Recommended trial of antifungal medication as discussed on the pervious visit.   Patient had not improved with steroids and antibiotics.   If this does not improve, she should see derm or oral surgery for evaluation. May need biopsy done to evaluate for any systemic causes.    Patient was offered derma referral right now, but she would like to try antifungal medication first.   Follow up with Provider - as needed     Jermain Arcos MD  [facility] " [facility]

## 2017-05-15 ENCOUNTER — OFFICE VISIT (OUTPATIENT)
Dept: ORTHOPEDICS | Facility: CLINIC | Age: 72
End: 2017-05-15
Payer: COMMERCIAL

## 2017-05-15 VITALS
WEIGHT: 140 LBS | DIASTOLIC BLOOD PRESSURE: 64 MMHG | HEIGHT: 66 IN | SYSTOLIC BLOOD PRESSURE: 118 MMHG | BODY MASS INDEX: 22.5 KG/M2

## 2017-05-15 DIAGNOSIS — M51.369 DDD (DEGENERATIVE DISC DISEASE), LUMBAR: Primary | ICD-10-CM

## 2017-05-15 DIAGNOSIS — M24.152 DEGENERATIVE TEAR OF ACETABULAR LABRUM OF LEFT HIP: ICD-10-CM

## 2017-05-15 DIAGNOSIS — M85.80 OSTEOPENIA: ICD-10-CM

## 2017-05-15 PROCEDURE — 99214 OFFICE O/P EST MOD 30 MIN: CPT | Performed by: FAMILY MEDICINE

## 2017-05-15 RX ORDER — HYDROCODONE BITARTRATE AND ACETAMINOPHEN 5; 325 MG/1; MG/1
TABLET ORAL
Qty: 30 TABLET | Refills: 0 | Status: SHIPPED | OUTPATIENT
Start: 2017-05-15 | End: 2017-06-22

## 2017-05-15 ASSESSMENT — ENCOUNTER SYMPTOMS
FOCAL WEAKNESS: 0
SENSORY CHANGE: 1
TINGLING: 0
BACK PAIN: 1

## 2017-05-15 NOTE — PROGRESS NOTES
[facility]   Follow-up Visit s May 15, 2017    PCP: Jermain Arcos      Subjective:  Trish is a 72 year old female who is seen in follow up for evaluation of   Chief Complaint   Patient presents with     Hip Pain     left     Her last visit was on 4/20/2017.  Since that time, symptoms have been worsened. She notes increased left side pain that started on 4/21/2017, when she stumbled on a foot stool getting out of bed, stepping down hard on her LEFT foot, did not fall. Pain came on suddenly on LEFT side, radiates down posterior and lateral LEFT hip.     Using asa, nothing else.     Getting slightly better.     Patient's past medical, surgical, social and family histories are reviewed today.      Prior History of related problems: fall and fractured lower back in 2005    Pain worse after walking 1/4 mile.    Feels better using grocery cart in store. No RLE symptoms.     Low back pain also, not as bad as LEFT hip.    Social History: retired     Past Medical History:   Diagnosis Date     Aortic aneurysm (H)      Environmental allergies 6/14/2016     Essential hypertension with goal blood pressure less than 140/90 6/14/2016     Hip pain, left      Osteopenia        Patient Active Problem List    Diagnosis Date Noted     Aortic aneurysm (H)      Priority: Medium     Essential hypertension with goal blood pressure less than 140/90 06/14/2016     Priority: Medium     Environmental allergies 06/14/2016     Priority: Medium     Hip pain, left      Priority: Medium     Osteopenia      Priority: Medium       Family History   Problem Relation Age of Onset     OSTEOPOROSIS Mother      Type 2 Diabetes Son        Social History     Social History     Marital status:      Spouse name: N/A     Number of children: N/A     Years of education: N/A     Social History Main Topics     Smoking status: Former Smoker     Smokeless tobacco: Never Used     Alcohol use 0.0 oz/week     0 Standard drinks or  "equivalent per week      Comment: rare     Past Surgical History:   Procedure Laterality Date     CATARACT IOL, RT/LT       HYSTERECTOMY TOTAL ABDOMINAL  1966     MAMMOPLASTY AUGMENTATION       VAGOTOMY, PYLOROPLASTY, COMBINED         Review of Systems   Musculoskeletal: Positive for back pain and joint pain.   Neurological: Positive for sensory change. Negative for tingling and focal weakness.   All other systems reviewed and are negative.        Physical Exam  /64  Ht 5' 6\" (1.676 m)  Wt 140 lb (63.5 kg)  BMI 22.6 kg/m2  Constitutional:well-developed, well-nourished, and in no distress.   Cardiovascular: Intact distal pulses.    Neurological: alert. Gait shuffling, difficulty transitioning from sit to stand  Skin: Skin is warm and dry.   Psychiatric: Mood and affect normal.   Respiratory: unlabored, speaks in full sentences  Lymph: no LAD, no lymphangitis      Back Exam   Sensation: Normal.  Gait: Abnormal.    Tenderness   The patient is experiencing tenderness in the lumbar, left lumbosacral region.    Range of Motion   Flexion:                    30  Extension:                10  Lateral Bend Left:    10  Lateral Bend Right:  10  Rotation Right:         70  Rotation Left:           70        DX HIP/PELVIS/SPINE 3/20/2017 1:44 PM  HISTORY: Asymptomatic menopausal state  T-SCORES:  Lumbar Spine L1-L4 T-score: -1.6     Left Hip (Neck) T-score: -2.1  Right Hip (Total) T-score: -1.9     Hip lowest neck BMD: 0.742 gm/cm2.     FRAX 10-YEAR PROBABILITY OF FRACTURE*:  Major Osteoporotic: 44%  Hip: 21%     *All treatment decisions require clinical judgment and consideration  of individual patient factors which may not be captured in the FRAX  model and the risk of fracture may be over- or under-estimated by  FRAX.  IMPRESSION: Lumbar spine and bilateral hip osteopenia.  ARIES MCADAMS MD      LUMBAR SPINE TWO TO THREE VIEWS August 16, 2016 1:46 PM  IMPRESSION: Moderate compression fracture of T12 and mild " compression  fracture of L3, age indeterminate. Degenerative changes at L5-S1 with  at least moderate disc space narrowing and endplate sclerosis.  Posterior alignment satisfactory. Atherosclerotic calcification of the  aorta and proximal iliac vessels. 4.9 cm distal aortic aneurysm.  Smoothly lobulated prominent bulge of the posterior diaphragmatic  region on the lateral view, right versus left not known. This could  represent a prominent Bochdalek hernia or diaphragmatic hernia,  possibly eventration of the diaphragm.     Discussed with Dr. Arcos. The low back pain is chronic. Known vertebral  body compression fractures.  DANA MEDEIROS MD      XR PELVIS 1/2 VW 6/14/2016 3:16 PM  HISTORY: Pain in left hip   IMPRESSION: Demineralized bones. Exam otherwise negative.  SAMRA STORM MD    EXAM: MRI EXAMINATION OF THE LEFT HIP  CLINICAL INFORMATION: Chronic left hip pain. History of falls. No history of surgery to this area. Possible insufficiency fracture.  TECHNICAL INFORMATION: Large field-of-view coronal T1 and STIR images were obtained. Thin section coronal and sagittal proton density and T2-weighted spin echo sequences were obtained through the left hip followed by axial proton density and fat saturation T2-weighted images. There are no prior studies available for comparison.   INTERPRETATION:  Hip joint: There is no evidence of hip joint effusion or loose body. No subchondral edema signal or cystic change. No discrete lesion identified to indicate AVN. No evidence of marrow edema pattern to indicate fracture or stress reaction of the femoral neck.   Poorly defined tearing involves the base of the anterior aspect the superior labrum on series 6 image 18. Series 5 images 17 through 21 demonstrate tearing along the labral base throughout the anterosuperior labrum.  There is no evidence for a chondral defect. No advanced changes of chondromalacia identified.  The gluteus tendon insertions onto the greater trochanter  are intact without tear or significant tendinopathy. No evidence of fluid signal abnormality to indicate trochanteric bursitis. No evidence for iliopsoas bursitis.   Bones and joints: There is a moderate appearance of L5-S1 degenerative disc disease. No occult fracture or stress reaction of the sacrum. No significant SI joint arthrosis. No abnormal marrow edema pattern to indicate acute stress reaction or stress fracture. There is no other bone marrow edema pattern.  Musculotendinous structures: No evidence of acute musculotendinous injury. Specifically, no evidence of acute muscle tear, hematoma or other edema pattern.   Intrapelvic contents: No free fluid seen within the pelvis. No discrete intrapelvic mass is identified.   Neurovascular structures: No discrete cyst, mass or other compression upon the portions visualized of sciatic or femoral nerves.   CONCLUSION:   1. No evidence for occult fracture or stress reaction. No other bone marrow edema pattern. No evidence for AVN.  2. Tearing involves the base of the anterior aspect of the superior labrum and continues broad-based through the anterosuperior labrum.  3. No advanced changes of chondromalacia, and without significant osteoarthritis.  4. No evidence for bursitis about the hip.  5. Moderate L5-S1 degenerative disc disease.      ASSESSMENT/PLAN    ICD-10-CM    1. DDD (degenerative disc disease), lumbar M51.36 HYDROcodone-acetaminophen (NORCO) 5-325 MG per tablet     [facility] REFERR[facility]   2. Osteopenia M85.80    3. 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  Spine seems likely source for LEFT sided pain; recent flare up of LEFT sided back pain seems to support this.   She declines MRI of low back today; will start PHYSICAL THERAPY. Ok to start norco for short term  pain control.     Recheck if not better after 4 PHYSICAL THERAPY visits.

## 2017-05-15 NOTE — NURSING NOTE
"Chief Complaint   Patient presents with     Hip Pain     left       Initial /64  Ht 5' 6\" (1.676 m)  Wt 140 lb (63.5 kg)  BMI 22.6 kg/m2 Estimated body mass index is 22.6 kg/(m^2) as calculated from the following:    Height as of this encounter: 5' 6\" (1.676 m).    Weight as of this encounter: 140 lb (63.5 kg).  Medication Reconciliation: complete     Twyla Hdz, ATC      "

## 2017-05-22 ENCOUNTER — THERAPY VISIT (OUTPATIENT)
Dept: PHYSICAL THERAPY | Facility: CLINIC | Age: 72
End: 2017-05-22
Payer: COMMERCIAL

## 2017-05-22 DIAGNOSIS — M25.552 HIP PAIN, LEFT: ICD-10-CM

## 2017-05-22 DIAGNOSIS — M54.50 LUMBAGO: Primary | ICD-10-CM

## 2017-05-22 PROCEDURE — 97110 THERAPEUTIC EXERCISES: CPT | Mod: GP | Performed by: PHYSICAL THERAPIST

## 2017-05-22 PROCEDURE — G8978 MOBILITY CURRENT STATUS: HCPCS | Mod: GP | Performed by: PHYSICAL THERAPIST

## 2017-05-22 PROCEDURE — 97161 PT EVAL LOW COMPLEX 20 MIN: CPT | Mod: GP | Performed by: PHYSICAL THERAPIST

## 2017-05-22 PROCEDURE — G8979 MOBILITY GOAL STATUS: HCPCS | Mod: GP | Performed by: PHYSICAL THERAPIST

## 2017-05-22 ASSESSMENT — ACTIVITIES OF DAILY LIVING (ADL)
RECREATIONAL_ACTIVITIES: UNABLE TO DO
WALKING_DOWN_STEEP_HILLS: SLIGHT DIFFICULTY
LIGHT_TO_MODERATE_WORK: SLIGHT DIFFICULTY
PUTTING_ON_SOCKS_AND_SHOES: SLIGHT DIFFICULTY
HOS_ADL_ITEM_SCORE_TOTAL: 28
HOS_ADL_COUNT: 14
GOING_UP_1_FLIGHT_OF_STAIRS: NO DIFFICULTY AT ALL
HOS_ADL_HIGHEST_POTENTIAL_SCORE: 56
DEEP_SQUATTING: UNABLE TO DO
WALKING_15_MINUTES_OR_GREATER: UNABLE TO DO
HOS_ADL_SCORE(%): 50
GETTING_INTO_AND_OUT_OF_AN_AVERAGE_CAR: SLIGHT DIFFICULTY
WALKING_UP_STEEP_HILLS: UNABLE TO DO
ROLLING_OVER_IN_BED: SLIGHT DIFFICULTY
WALKING_INITIALLY: NO DIFFICULTY AT ALL
HEAVY_WORK: UNABLE TO DO
STANDING_FOR_15_MINUTES: SLIGHT DIFFICULTY
WALKING_APPROXIMATELY_10_MINUTES: EXTREME DIFFICULTY
GOING_DOWN_1_FLIGHT_OF_STAIRS: NO DIFFICULTY AT ALL

## 2017-05-22 NOTE — MR AVS SNAPSHOT
After Visit Summary   5/22/2017    Trish Wu    MRN: [id number]           Patient Information     Date Of Birth          1945        Visit Information        Provider Department      5/22/2017 10:00 AM Ta Bullock, SUSAN [facility] [place] [facility]        Today's Diagnoses     Lumbago    -  1    Hip pain, left           Follow-ups after your visit        Your next 10 appointments already scheduled     May 30, 2017  9:00 AM CDT   [facility] Spine with Ta Bullock PT   [facility] ([facility] [place])    [address] [place] [address]  [address]  [place] [id number] [phone]              Who to contact     If you have questions or need follow up information about today's clinic visit or your schedule please contact [facility] [place] [facility] directly at [phone].  Normal or non-critical lab and imaging results will be communicated to you by Euclidhart, letter or phone within 4 business days after the clinic has received the results. If you do not hear from us within 7 days, please contact the clinic through Euclidhart or phone. If you have a critical or abnormal lab result, we will notify you by phone as soon as possible.  Submit refill requests through TradeTools FX or call your pharmacy and they will forward the refill request to us. Please allow 3 business days for your refill to be completed.          Additional Information About Your Visit        MyChart Information     TradeTools FX gives you secure access to your electronic health record. If you see a primary care provider, you can also send messages to your care team and make appointments. If you have questions, please call your primary care clinic.  If you do not have a primary care provider, please call [phone] and they will assist you.        Care EveryWhere ID     This is your Care EveryWhere ID. This could be used by other  organizations to access your [place] medical records  [id number]         Blood Pressure from Last 3 Encounters:   05/15/17 118/64   04/28/17 138/86   04/20/17 122/78    Weight from Last 3 Encounters:   05/15/17 63.5 kg (140 lb)   04/28/17 64.8 kg (142 lb 13.6 oz)   04/20/17 63.5 kg (140 lb)              We Performed the Following     HC PT EVAL, LOW COMPLEXITY     [facility] CERT REPORT     [facility] INITIAL EVAL REPORT     THERAPEUTIC EXERCISES        Primary Care Provider Office Phone # Fax #    Jermain Arcos [phone]       [facility] [place] [facility] [address] [place] [address] DR  [facility] [place] [address]        Thank you!     Thank you for choosing [place] [facility]  [facility] your care. Our goal is always to provide you with excellent care. Hearing back from our patients is one way we can continue to improve our services. Please take a few minutes to complete the written survey that you may receive in the mail after your visit with us. Thank you!             Your Updated Medication List - Protect others around you: Learn how to safely use, store and throw away your medicines at www.disposemymeds.org.          This list is accurate as of: 5/22/17 11:07 AM.  Always use your most recent med list.                   Brand Name Dispense Instructions for use    cetirizine 10 MG tablet    zyrTEC     Take 1 tablet (10 mg) by mouth every evening       HYDROcodone-acetaminophen 5-325 MG per tablet    NORCO    30 tablet    Take 1-2 tabs every 6 hours as needed for low back pain       lisinopril-hydrochlorothiazide 20-25 MG per tablet    PRINZIDE/ZESTORETIC    90 tablet    Take 1 tablet by mouth daily       montelukast 10 MG tablet    SINGULAIR    90 tablet    Take 1 tablet (10 mg) by mouth At Bedtime       NASONEX 50 MCG/ACT spray   Generic drug:  mometasone      Spray 2 sprays into both nostrils daily

## 2017-05-30 ENCOUNTER — THERAPY VISIT (OUTPATIENT)
Dept: PHYSICAL THERAPY | Facility: CLINIC | Age: 72
End: 2017-05-30
Payer: COMMERCIAL

## 2017-05-30 DIAGNOSIS — M25.552 HIP PAIN, LEFT: ICD-10-CM

## 2017-05-30 DIAGNOSIS — M54.50 LUMBAGO: ICD-10-CM

## 2017-05-30 PROCEDURE — 97010 HOT OR COLD PACKS THERAPY: CPT | Mod: GP | Performed by: PHYSICAL THERAPIST

## 2017-05-30 PROCEDURE — 97110 THERAPEUTIC EXERCISES: CPT | Mod: GP | Performed by: PHYSICAL THERAPIST

## 2017-05-30 PROCEDURE — G0283 ELEC STIM OTHER THAN WOUND: HCPCS | Mod: GP | Performed by: PHYSICAL THERAPIST

## 2017-06-07 ENCOUNTER — THERAPY VISIT (OUTPATIENT)
Dept: PHYSICAL THERAPY | Facility: CLINIC | Age: 72
End: 2017-06-07
Payer: COMMERCIAL

## 2017-06-07 DIAGNOSIS — M54.50 LUMBAGO: ICD-10-CM

## 2017-06-07 DIAGNOSIS — M25.552 HIP PAIN, LEFT: ICD-10-CM

## 2017-06-07 PROCEDURE — 97110 THERAPEUTIC EXERCISES: CPT | Mod: GP | Performed by: PHYSICAL THERAPIST

## 2017-06-07 PROCEDURE — G0283 ELEC STIM OTHER THAN WOUND: HCPCS | Mod: GP | Performed by: PHYSICAL THERAPIST

## 2017-06-07 PROCEDURE — 97010 HOT OR COLD PACKS THERAPY: CPT | Mod: GP | Performed by: PHYSICAL THERAPIST

## 2017-06-13 ENCOUNTER — THERAPY VISIT (OUTPATIENT)
Dept: PHYSICAL THERAPY | Facility: CLINIC | Age: 72
End: 2017-06-13
Payer: COMMERCIAL

## 2017-06-13 DIAGNOSIS — M25.552 HIP PAIN, LEFT: ICD-10-CM

## 2017-06-13 DIAGNOSIS — M54.50 LUMBAGO: ICD-10-CM

## 2017-06-13 PROCEDURE — G0283 ELEC STIM OTHER THAN WOUND: HCPCS | Mod: GP | Performed by: PHYSICAL THERAPIST

## 2017-06-13 PROCEDURE — 97010 HOT OR COLD PACKS THERAPY: CPT | Mod: GP | Performed by: PHYSICAL THERAPIST

## 2017-06-13 PROCEDURE — 97110 THERAPEUTIC EXERCISES: CPT | Mod: GP | Performed by: PHYSICAL THERAPIST

## 2017-06-16 ENCOUNTER — THERAPY VISIT (OUTPATIENT)
Dept: PHYSICAL THERAPY | Facility: CLINIC | Age: 72
End: 2017-06-16
Payer: COMMERCIAL

## 2017-06-16 DIAGNOSIS — M25.552 HIP PAIN, LEFT: ICD-10-CM

## 2017-06-16 DIAGNOSIS — M54.50 LUMBAGO: ICD-10-CM

## 2017-06-16 PROCEDURE — 97110 THERAPEUTIC EXERCISES: CPT | Mod: GP | Performed by: PHYSICAL THERAPIST

## 2017-06-16 PROCEDURE — 97140 MANUAL THERAPY 1/> REGIONS: CPT | Mod: GP | Performed by: PHYSICAL THERAPIST

## 2017-06-16 PROCEDURE — G0283 ELEC STIM OTHER THAN WOUND: HCPCS | Mod: GP | Performed by: PHYSICAL THERAPIST

## 2017-06-16 PROCEDURE — 97012 MECHANICAL TRACTION THERAPY: CPT | Mod: GP | Performed by: PHYSICAL THERAPIST

## 2017-06-19 ENCOUNTER — THERAPY VISIT (OUTPATIENT)
Dept: PHYSICAL THERAPY | Facility: CLINIC | Age: 72
End: 2017-06-19
Payer: COMMERCIAL

## 2017-06-19 DIAGNOSIS — M54.50 LUMBAGO: ICD-10-CM

## 2017-06-19 DIAGNOSIS — M25.552 HIP PAIN, LEFT: ICD-10-CM

## 2017-06-19 PROCEDURE — 97012 MECHANICAL TRACTION THERAPY: CPT | Mod: GP | Performed by: PHYSICAL THERAPIST

## 2017-06-19 PROCEDURE — 97530 THERAPEUTIC ACTIVITIES: CPT | Mod: GP | Performed by: PHYSICAL THERAPIST

## 2017-06-19 PROCEDURE — 97010 HOT OR COLD PACKS THERAPY: CPT | Mod: GP | Performed by: PHYSICAL THERAPIST

## 2017-06-19 PROCEDURE — G0283 ELEC STIM OTHER THAN WOUND: HCPCS | Mod: GP | Performed by: PHYSICAL THERAPIST

## 2017-06-22 ENCOUNTER — THERAPY VISIT (OUTPATIENT)
Dept: PHYSICAL THERAPY | Facility: CLINIC | Age: 72
End: 2017-06-22
Payer: COMMERCIAL

## 2017-06-22 ENCOUNTER — OFFICE VISIT (OUTPATIENT)
Dept: ORTHOPEDICS | Facility: CLINIC | Age: 72
End: 2017-06-22
Payer: COMMERCIAL

## 2017-06-22 VITALS
DIASTOLIC BLOOD PRESSURE: 68 MMHG | WEIGHT: 140 LBS | SYSTOLIC BLOOD PRESSURE: 112 MMHG | HEIGHT: 66 IN | BODY MASS INDEX: 22.5 KG/M2

## 2017-06-22 DIAGNOSIS — M25.552 HIP PAIN, LEFT: ICD-10-CM

## 2017-06-22 DIAGNOSIS — M24.152 DEGENERATIVE TEAR OF ACETABULAR LABRUM OF LEFT HIP: ICD-10-CM

## 2017-06-22 DIAGNOSIS — M70.62 TROCHANTERIC BURSITIS OF LEFT HIP: ICD-10-CM

## 2017-06-22 DIAGNOSIS — M51.369 DDD (DEGENERATIVE DISC DISEASE), LUMBAR: ICD-10-CM

## 2017-06-22 DIAGNOSIS — S32.000S COMPRESSION FRACTURE OF LUMBAR VERTEBRA, SEQUELA: ICD-10-CM

## 2017-06-22 DIAGNOSIS — M54.50 LUMBAGO: ICD-10-CM

## 2017-06-22 DIAGNOSIS — M85.80 OSTEOPENIA: Primary | ICD-10-CM

## 2017-06-22 PROCEDURE — 97010 HOT OR COLD PACKS THERAPY: CPT | Mod: GP | Performed by: PHYSICAL THERAPIST

## 2017-06-22 PROCEDURE — G0283 ELEC STIM OTHER THAN WOUND: HCPCS | Mod: GP | Performed by: PHYSICAL THERAPIST

## 2017-06-22 PROCEDURE — 99214 OFFICE O/P EST MOD 30 MIN: CPT | Mod: 25 | Performed by: FAMILY MEDICINE

## 2017-06-22 PROCEDURE — 97110 THERAPEUTIC EXERCISES: CPT | Mod: GP | Performed by: PHYSICAL THERAPIST

## 2017-06-22 PROCEDURE — 97140 MANUAL THERAPY 1/> REGIONS: CPT | Mod: GP | Performed by: PHYSICAL THERAPIST

## 2017-06-22 PROCEDURE — 97012 MECHANICAL TRACTION THERAPY: CPT | Mod: GP | Performed by: PHYSICAL THERAPIST

## 2017-06-22 PROCEDURE — 20610 DRAIN/INJ JOINT/BURSA W/O US: CPT | Mod: LT | Performed by: FAMILY MEDICINE

## 2017-06-22 RX ORDER — ALENDRONATE SODIUM 70 MG/1
70 TABLET ORAL
Qty: 12 TABLET | Refills: 3 | Status: SHIPPED | OUTPATIENT
Start: 2017-06-22 | End: 2018-04-20

## 2017-06-22 RX ORDER — HYDROCODONE BITARTRATE AND ACETAMINOPHEN 5; 325 MG/1; MG/1
TABLET ORAL
Qty: 30 TABLET | Refills: 0 | Status: SHIPPED | OUTPATIENT
Start: 2017-06-22 | End: 2017-08-02

## 2017-06-22 ASSESSMENT — ENCOUNTER SYMPTOMS
FOCAL WEAKNESS: 0
TINGLING: 0
BACK PAIN: 1
SENSORY CHANGE: 1

## 2017-06-22 NOTE — MR AVS SNAPSHOT
After Visit Summary   6/22/2017    Trish Wu    MRN: [id number]           Patient Information     Date Of Birth          1945        Visit Information        Provider Department      6/22/2017 11:20 AM Kavin Eddy MD [place] [facility] [facility]        Today's Diagnoses     Osteopenia    -  1    Compression fracture of lumbar vertebra, sequela        Trochanteric bursitis of left hip        DDD (degenerative disc disease), lumbar        Degenerative tear of acetabular labrum of left hip           Follow-ups after your visit        Your next 10 appointments already scheduled     Jun 28, 2017 11:20 AM CDT   [facility] Spine with Ta Bullock PT   [place] [facility] [place] [facility] ([facility] [place])    [address] [place] [address]  [address]  [place] [address] [phone]              Who to contact     If you have questions or need follow up information about today's clinic visit or your schedule please contact [place] [facility] directly at [phone].  Normal or non-critical lab and imaging results will be communicated to you by EnerG2hart, letter or phone within 4 business days after the clinic has received the results. If you do not hear from us within 7 days, please contact the clinic through ISO Groupt or phone. If you have a critical or abnormal lab result, we will notify you by phone as soon as possible.  Submit refill requests through Ongage or call your pharmacy and they will forward the refill request to us. Please allow 3 business days for your refill to be completed.          Additional Information About Your Visit        EnerG2hart Information     Ongage gives you secure access to your electronic health record. If you see a primary care provider, you can also send messages to your care team and make appointments. If you have questions, please call your primary care clinic.  If you do  "not have a primary care provider, please call [phone] and they will assist you.        Care EveryWhere ID     This is your Care EveryWhere ID. This could be used by other organizations to access your [facility] medical records  [id number]        Your Vitals Were     Height BMI (Body Mass Index)                5' 6\" (1.676 m) 22.6 kg/m2           Blood Pressure from Last 3 Encounters:   06/22/17 112/68   05/15/17 118/64   04/28/17 138/86    Weight from Last 3 Encounters:   06/22/17 140 lb (63.5 kg)   05/15/17 140 lb (63.5 kg)   04/28/17 142 lb 13.6 oz (64.8 kg)              Today, you had the following     No orders found for display         Today's Medication Changes          These changes are accurate as of: 6/22/17 12:37 PM.  If you have any questions, ask your nurse or doctor.               Start taking these medicines.        Dose/Directions    alendronate 70 MG tablet   Commonly known as:  FOSAMAX   Used for:  Osteopenia, Compression fracture of lumbar vertebra, sequela   Started by:  Kavin Eddy MD        Dose:  70 mg   Take 1 tablet (70 mg) by mouth every 7 days Take 60 minutes before am meal with 8 oz. water. Remain upright for 30 minutes.   Quantity:  12 tablet   Refills:  3            Where to get your medicines      These medications were sent to [facility] [place] [place] - [address] [place] [address], [facility] [place] [address]     Phone:  [phone]     alendronate 70 MG tablet         Some of these will need a paper prescription and others can be bought over the counter.  Ask your nurse if you have questions.     Bring a paper prescription for each of these medications     HYDROcodone-acetaminophen 5-325 MG per tablet                Primary Care Provider Office Phone # Fax #    Jermain Arcos [phone]       [facility] [address] [place] [address] DR  [facility] [place] [address]        Equal Access to Services     [facility] " GAAR : Hadii aad ku hadmadhu Castroali, waaxda luqadaha, qaybta kaalmada adegavin, lizeth silviain hayaavivienne bullshanice prather daryl . So [facility] [phone].    ATENCIÓN: Si habla wanda, tiene a valencia disposición servicios gratuitos de asistencia lingüística. Llame al [phone].    We comply with applicable federal civil rights laws and [place] laws. We do not discriminate on the basis of race, color, national origin, age, disability sex, sexual orientation or gender identity.            Thank you!     Thank you for choosing [place] [facility] [facility]  for your care. Our goal is always to provide you with excellent care. Hearing back from our patients is one way we can continue to improve our services. Please take a few minutes to complete the written survey that you may receive in the mail after your visit with us. Thank you!             Your Updated Medication List - Protect others around you: Learn how to safely use, store and throw away your medicines at www.disposemymeds.org.          This list is accurate as of: 6/22/17 12:37 PM.  Always use your most recent med list.                   Brand Name Dispense Instructions for use Diagnosis    alendronate 70 MG tablet    FOSAMAX    12 tablet    Take 1 tablet (70 mg) by mouth every 7 days Take 60 minutes before am meal with 8 oz. water. Remain upright for 30 minutes.    Osteopenia, Compression fracture of lumbar vertebra, sequela       cetirizine 10 MG tablet    zyrTEC     Take 1 tablet (10 mg) by mouth every evening        HYDROcodone-acetaminophen 5-325 MG per tablet    NORCO    30 tablet    Take 1-2 tabs every 6 hours as needed for low back pain    DDD (degenerative disc disease), lumbar, Degenerative tear of acetabular labrum of left hip       lisinopril-hydrochlorothiazide 20-25 MG per tablet    PRINZIDE/ZESTORETIC    90 tablet    Take 1 tablet by mouth daily    Essential hypertension with goal blood pressure less than 140/90        montelukast 10 MG tablet    SINGULAIR    90 tablet    Take 1 tablet (10 mg) by mouth At Bedtime    Environmental allergies       NASONEX 50 MCG/ACT spray   Generic drug:  mometasone      Spray 2 sprays into both nostrils daily

## 2017-06-22 NOTE — PROGRESS NOTES
[facility]   Follow-up Visit s Jun 22, 2017    PCP: Jermain Arcos      Subjective:  Trish is a 72 year old female who is seen in follow up for evaluation of   Chief Complaint   Patient presents with     Hip Pain     left     Her last visit was on 5/15/2017.  Since that time, symptoms have been the same. Patient reports intense pain when she walks 1/2 block. Patient is going to [place] and [place] in 2 weeks and would like to address this issue prior to leaving.     Patient's past medical, surgical, social and family histories are reviewed today.    Trish Wu is alone today.      History from previous visit on 5/15/2017    Her last visit was on 4/20/2017.  Since that time, symptoms have been worsened. She notes increased left side pain that started on 4/21/2017, when she stumbled on a foot stool getting out of bed, stepping down hard on her LEFT foot, did not fall. Pain came on suddenly on LEFT side, radiates down posterior and lateral LEFT hip.     Using asa, nothing else.     Getting slightly better.     Patient's past medical, surgical, social and family histories are reviewed today.      Prior History of related problems: fall and fractured lower back in 2005    Pain worse after walking 1/4 mile.    Feels better using grocery cart in store. No RLE symptoms.     Low back pain also, not as bad as LEFT hip.    Social History: retired     Past Medical History:   Diagnosis Date     Aortic aneurysm (H)      Environmental allergies 6/14/2016     Essential hypertension with goal blood pressure less than 140/90 6/14/2016     Hip pain, left      Osteopenia        Patient Active Problem List    Diagnosis Date Noted     Lumbago 05/22/2017     Priority: Medium     Aortic aneurysm (H)      Priority: Medium     Essential hypertension with goal blood pressure less than 140/90 06/14/2016     Priority: Medium     Environmental allergies 06/14/2016     Priority: Medium     Hip pain, left       Priority: Medium     Osteopenia      Priority: Medium       Family History   Problem Relation Age of Onset     OSTEOPOROSIS Mother      Type 2 Diabetes Son        Social History     Social History     Marital status:      Spouse name: N/A     Number of children: N/A     Years of education: N/A     Social History Main Topics     Smoking status: Former Smoker     Smokeless tobacco: Never Used     Alcohol use 0.0 oz/week     0 Standard drinks or equivalent per week      Comment: rare     Past Surgical History:   Procedure Laterality Date     CATARACT IOL, RT/LT       HYSTERECTOMY TOTAL ABDOMINAL  1966     MAMMOPLASTY AUGMENTATION       VAGOTOMY, PYLOROPLASTY, COMBINED         Review of Systems   Musculoskeletal: Positive for back pain and joint pain.   Neurological: Positive for sensory change. Negative for tingling and focal weakness.   All other systems reviewed and are negative.        Physical Exam  There were no vitals taken for this visit.  Constitutional:well-developed, well-nourished, and in no distress.   Cardiovascular: Intact distal pulses.    Neurological: alert. Gait shuffling, difficulty transitioning from sit to stand  Skin: Skin is warm and dry.   Psychiatric: Mood and affect normal.   Respiratory: unlabored, speaks in full sentences  Lymph: no LAD, no lymphangitis      Back Exam   Sensation: Normal.  Gait: Abnormal.    Tenderness   The patient is experiencing tenderness in the lumbar, left lumbosacral region.    Range of Motion   Flexion:                    30  Extension:                10  Lateral Bend Left:    10  Lateral Bend Right:  10  Rotation Right:         70  Rotation Left:           70        DX HIP/PELVIS/SPINE 3/20/2017 1:44 PM  HISTORY: Asymptomatic menopausal state  T-SCORES:  Lumbar Spine L1-L4 T-score: -1.6     Left Hip (Neck) T-score: -2.1  Right Hip (Total) T-score: -1.9     Hip lowest neck BMD: 0.742 gm/cm2.     FRAX 10-YEAR PROBABILITY OF FRACTURE*:  Major Osteoporotic:  44%  Hip: 21%     *All treatment decisions require clinical judgment and consideration  of individual patient factors which may not be captured in the FRAX  model and the risk of fracture may be over- or under-estimated by  FRAX.  IMPRESSION: Lumbar spine and bilateral hip osteopenia.  ARIES MCADAMS MD      LUMBAR SPINE TWO TO THREE VIEWS August 16, 2016 1:46 PM  IMPRESSION: Moderate compression fracture of T12 and mild compression  fracture of L3, age indeterminate. Degenerative changes at L5-S1 with  at least moderate disc space narrowing and endplate sclerosis.  Posterior alignment satisfactory. Atherosclerotic calcification of the  aorta and proximal iliac vessels. 4.9 cm distal aortic aneurysm.  Smoothly lobulated prominent bulge of the posterior diaphragmatic  region on the lateral view, right versus left not known. This could  represent a prominent Bochdalek hernia or diaphragmatic hernia,  possibly eventration of the diaphragm.     Discussed with Dr. Arcos. The low back pain is chronic. Known vertebral  body compression fractures.  DANA MEDEIROS MD      XR PELVIS 1/2 VW 6/14/2016 3:16 PM  HISTORY: Pain in left hip   IMPRESSION: Demineralized bones. Exam otherwise negative.  SAMRA STORM MD    EXAM: MRI EXAMINATION OF THE LEFT HIP  CLINICAL INFORMATION: Chronic left hip pain. History of falls. No history of surgery to this area. Possible insufficiency fracture.  TECHNICAL INFORMATION: Large field-of-view coronal T1 and STIR images were obtained. Thin section coronal and sagittal proton density and T2-weighted spin echo sequences were obtained through the left hip followed by axial proton density and fat saturation T2-weighted images. There are no prior studies available for comparison.   INTERPRETATION:  Hip joint: There is no evidence of hip joint effusion or loose body. No subchondral edema signal or cystic change. No discrete lesion identified to indicate AVN. No evidence of marrow edema pattern to  indicate fracture or stress reaction of the femoral neck.   Poorly defined tearing involves the base of the anterior aspect the superior labrum on series 6 image 18. Series 5 images 17 through 21 demonstrate tearing along the labral base throughout the anterosuperior labrum.  There is no evidence for a chondral defect. No advanced changes of chondromalacia identified.  The gluteus tendon insertions onto the greater trochanter are intact without tear or significant tendinopathy. No evidence of fluid signal abnormality to indicate trochanteric bursitis. No evidence for iliopsoas bursitis.   Bones and joints: There is a moderate appearance of L5-S1 degenerative disc disease. No occult fracture or stress reaction of the sacrum. No significant SI joint arthrosis. No abnormal marrow edema pattern to indicate acute stress reaction or stress fracture. There is no other bone marrow edema pattern.  Musculotendinous structures: No evidence of acute musculotendinous injury. Specifically, no evidence of acute muscle tear, hematoma or other edema pattern.   Intrapelvic contents: No free fluid seen within the pelvis. No discrete intrapelvic mass is identified.   Neurovascular structures: No discrete cyst, mass or other compression upon the portions visualized of sciatic or femoral nerves.   CONCLUSION:   1. No evidence for occult fracture or stress reaction. No other bone marrow edema pattern. No evidence for AVN.  2. Tearing involves the base of the anterior aspect of the superior labrum and continues broad-based through the anterosuperior labrum.  3. No advanced changes of chondromalacia, and without significant osteoarthritis.  4. No evidence for bursitis about the hip.  5. Moderate L5-S1 degenerative disc disease.      ASSESSMENT/PLAN    ICD-10-CM    1. Osteopenia M85.80 alendronate (FOSAMAX) 70 MG tablet   2. Compression fracture of lumbar vertebra, sequela S32.000S alendronate (FOSAMAX) 70 MG tablet   3. Trochanteric  bursitis of left hip M70.62    4. DDD (degenerative disc disease), lumbar M51.36 HYDROcodone-acetaminophen (NORCO) 5-325 MG per tablet   5. Degenerative tear of acetabular labrum of left hip M16.9 HYDROcodone-acetaminophen (NORCO) 5-325 MG per tablet     Exam today most symptomatic of trochanteric bursitis. Discussed cortisone steroid injection with caution given hx of osteopenia and compression fractures.     Her FRAX score of 44% indicates risk greater than implied by T-score; preexisting compression fracture of lumbar spine is diagnostic of osteoporosis. Instructions on Fosamax use and side effects - particularly esophageal adverse events - are carefully reviewed with her. This drug must be taken upon arising for the day on an empty stomach, with a large 6-8 ounce glass of water; she must remain NPO in the upright position for at least 30 minutes afterwards and until after the first food of the day. If esophageal irritation is noted, she will stop the drug and call my office.    Discussed nature of syndrome as being related to friction of IT band across greater trochanter, and likelihood of muscular imbalance of hip flexors being greater than hip extensors as being the cause for the imbalance.  Offered cortisone injection for pain relief, to be followed by hip stabilization exercises for long term relief.    The benefits, risks, indications for and alternatives to the procedure were discussed with the patient, including bleeding, infection, hyperglycemia, localized lipodystrophy and hypopigmentation. She elected to proceed, and informed consent was signed.    PROCEDURE:  HIP JOINT BURSA INJECTION.         After a discussion of risks, benefits and side effects of procedure, informed patient consent was obtained, and form signed by patient and physician.       The greater trochanteric region of the left   hip was prepped with Chloroprep.     INJECTION:  Using 9 cc of 1% lidocaine mixed with 40 mg of DepoMedrol, the  left hip trochanteric bursa was successfully injected                           without complication using 22G 3.5 inch spinal needle.  She  tolerated the procedure well with minimal discomfort. Dressed with a bandaid. Instructed to monitor for increased warmth, redness, pain or swelling which might indicate an infection.

## 2017-06-22 NOTE — NURSING NOTE
"Chief Complaint   Patient presents with     Hip Pain     left       Initial /68  Ht 5' 6\" (1.676 m)  Wt 140 lb (63.5 kg)  BMI 22.6 kg/m2 Estimated body mass index is 22.6 kg/(m^2) as calculated from the following:    Height as of this encounter: 5' 6\" (1.676 m).    Weight as of this encounter: 140 lb (63.5 kg).  Medication Reconciliation: complete    "

## 2017-07-05 ENCOUNTER — THERAPY VISIT (OUTPATIENT)
Dept: PHYSICAL THERAPY | Facility: CLINIC | Age: 72
End: 2017-07-05
Payer: COMMERCIAL

## 2017-07-05 DIAGNOSIS — I10 ESSENTIAL HYPERTENSION WITH GOAL BLOOD PRESSURE LESS THAN 140/90: ICD-10-CM

## 2017-07-05 DIAGNOSIS — M54.50 LUMBAGO: ICD-10-CM

## 2017-07-05 DIAGNOSIS — M25.552 HIP PAIN, LEFT: ICD-10-CM

## 2017-07-05 PROCEDURE — 97110 THERAPEUTIC EXERCISES: CPT | Mod: GP | Performed by: PHYSICAL THERAPIST

## 2017-07-05 NOTE — TELEPHONE ENCOUNTER
Lisinopril/hctz 20/25 mg tabs        Last Written Prescription Date: 06/14/16  Last Fill Quantity: 90, # refills: 3  Last Office Visit with G, P or [facility] prescribing provider: 06/14/16       Potassium   Date Value Ref Range Status   08/16/2016 3.8 3.4 - 5.3 mmol/L Final     Creatinine   Date Value Ref Range Status   08/16/2016 0.94 0.52 - 1.04 mg/dL Final     BP Readings from Last 3 Encounters:   06/22/17 112/68   05/15/17 118/64   04/28/17 138/86       Thank You  Brittney Aaron [facility]

## 2017-07-07 RX ORDER — LISINOPRIL AND HYDROCHLOROTHIAZIDE 20; 25 MG/1; MG/1
1 TABLET ORAL DAILY
Qty: 90 TABLET | Refills: 0 | Status: SHIPPED | OUTPATIENT
Start: 2017-07-07 | End: 2017-07-31

## 2017-07-07 NOTE — TELEPHONE ENCOUNTER
Last OV 4/28/17  Medication is being filled for 1 time refill only due to:  due for appt and lab    Reminder sent to pharmacy    Mery Chavez RN

## 2017-07-31 ENCOUNTER — OFFICE VISIT (OUTPATIENT)
Dept: FAMILY MEDICINE | Facility: CLINIC | Age: 72
End: 2017-07-31
Payer: COMMERCIAL

## 2017-07-31 VITALS
OXYGEN SATURATION: 98 % | HEART RATE: 84 BPM | TEMPERATURE: 98.4 F | BODY MASS INDEX: 23.78 KG/M2 | WEIGHT: 148 LBS | HEIGHT: 66 IN | SYSTOLIC BLOOD PRESSURE: 130 MMHG | DIASTOLIC BLOOD PRESSURE: 67 MMHG

## 2017-07-31 DIAGNOSIS — Z23 NEED FOR VACCINATION: ICD-10-CM

## 2017-07-31 DIAGNOSIS — Z11.59 NEED FOR HEPATITIS C SCREENING TEST: ICD-10-CM

## 2017-07-31 DIAGNOSIS — D64.89 ANEMIA DUE TO OTHER CAUSE: ICD-10-CM

## 2017-07-31 DIAGNOSIS — Z12.11 SPECIAL SCREENING FOR MALIGNANT NEOPLASMS, COLON: ICD-10-CM

## 2017-07-31 DIAGNOSIS — M85.80 OSTEOPENIA, UNSPECIFIED LOCATION: ICD-10-CM

## 2017-07-31 DIAGNOSIS — I10 ESSENTIAL HYPERTENSION WITH GOAL BLOOD PRESSURE LESS THAN 140/90: Primary | ICD-10-CM

## 2017-07-31 LAB
ERYTHROCYTE [DISTWIDTH] IN BLOOD BY AUTOMATED COUNT: 14.2 % (ref 10–15)
HCT VFR BLD AUTO: 31.1 % (ref 35–47)
HGB BLD-MCNC: 10.4 G/DL (ref 11.7–15.7)
MCH RBC QN AUTO: 27.8 PG (ref 26.5–33)
MCHC RBC AUTO-ENTMCNC: 33.4 G/DL (ref 31.5–36.5)
MCV RBC AUTO: 83 FL (ref 78–100)
PLATELET # BLD AUTO: 300 10E9/L (ref 150–450)
RBC # BLD AUTO: 3.74 10E12/L (ref 3.8–5.2)
WBC # BLD AUTO: 6 10E9/L (ref 4–11)

## 2017-07-31 PROCEDURE — 99214 OFFICE O/P EST MOD 30 MIN: CPT | Mod: 25 | Performed by: FAMILY MEDICINE

## 2017-07-31 PROCEDURE — 80053 COMPREHEN METABOLIC PANEL: CPT | Performed by: FAMILY MEDICINE

## 2017-07-31 PROCEDURE — 80061 LIPID PANEL: CPT | Performed by: FAMILY MEDICINE

## 2017-07-31 PROCEDURE — 90670 PCV13 VACCINE IM: CPT | Performed by: FAMILY MEDICINE

## 2017-07-31 PROCEDURE — G0472 HEP C SCREEN HIGH RISK/OTHER: HCPCS | Performed by: FAMILY MEDICINE

## 2017-07-31 PROCEDURE — 36415 COLL VENOUS BLD VENIPUNCTURE: CPT | Performed by: FAMILY MEDICINE

## 2017-07-31 PROCEDURE — 85027 COMPLETE CBC AUTOMATED: CPT | Performed by: FAMILY MEDICINE

## 2017-07-31 PROCEDURE — G0009 ADMIN PNEUMOCOCCAL VACCINE: HCPCS | Performed by: FAMILY MEDICINE

## 2017-07-31 RX ORDER — LISINOPRIL AND HYDROCHLOROTHIAZIDE 20; 25 MG/1; MG/1
1 TABLET ORAL DAILY
Qty: 90 TABLET | Refills: 3 | Status: SHIPPED | OUTPATIENT
Start: 2017-07-31 | End: 2018-06-14 | Stop reason: SINTOL

## 2017-07-31 NOTE — PROGRESS NOTES
SUBJECTIVE:                                                    Trish Wu is a 72 year old female who presents to clinic today for the following health issues:      Hypertension Follow-up      Outpatient blood pressures are not being checked.    Low Salt Diet: no added salt        Amount of exercise or physical activity: None    Problems taking medications regularly: No    Medication side effects: none  Diet: regular (no restrictions)    Previous labs and problem list reviewed. Patient has osteopenia and anemia.     Problem list and histories reviewed & adjusted, as indicated.  Additional history: as documented    Patient Active Problem List   Diagnosis     Essential hypertension with goal blood pressure less than 140/90     Environmental allergies     Hip pain, left     Osteopenia     Aortic aneurysm (H)     Lumbago     Hyponatremia     Past Surgical History:   Procedure Laterality Date     CATARACT IOL, RT/LT       HYSTERECTOMY TOTAL ABDOMINAL  1966     MAMMOPLASTY AUGMENTATION       VAGOTOMY, PYLOROPLASTY, COMBINED         Social History   Substance Use Topics     Smoking status: Former Smoker     Smokeless tobacco: Never Used     Alcohol use 0.0 oz/week     0 Standard drinks or equivalent per week      Comment: rare     Family History   Problem Relation Age of Onset     OSTEOPOROSIS Mother      Type 2 Diabetes Son          Current Outpatient Prescriptions   Medication Sig Dispense Refill     Calcium Carb-Cholecalciferol (CALCIUM 500 +D) 500-400 MG-UNIT TABS Take 1 tablet by mouth 2 times daily       lisinopril-hydrochlorothiazide (PRINZIDE/ZESTORETIC) 20-25 MG per tablet Take 1 tablet by mouth daily . Need appointment and lab for further refill 90 tablet 3     alendronate (FOSAMAX) 70 MG tablet Take 1 tablet (70 mg) by mouth every 7 days Take 60 minutes before am meal with 8 oz. water. Remain upright for 30 minutes. 12 tablet 3     mometasone (NASONEX) 50 MCG/ACT nasal spray Spray 2 sprays into both  "nostrils daily       HYDROcodone-acetaminophen (NORCO) 5-325 MG per tablet Take 1-2 tabs every 6 hours as needed for low back pain 60 tablet 0     Allergies   Allergen Reactions     Penicillins Hives         Reviewed and updated as needed this visit by clinical staffTobacco  Allergies  Meds  Med Hx  Surg Hx  Fam Hx  Soc Hx      Reviewed and updated as needed this visit by Provider         ROS:  C: NEGATIVE for fever, chills, change in weight  E/M: NEGATIVE for ear, mouth and throat problems  R: NEGATIVE for significant cough or SOB  CV: NEGATIVE for chest pain, palpitations or peripheral edema    OBJECTIVE:                                                    /67 (BP Location: Right arm, Patient Position: Chair, Cuff Size: Adult Regular)  Pulse 84  Temp 98.4  F (36.9  C) (Tympanic)  Ht 5' 6\" (1.676 m)  Wt 148 lb (67.1 kg)  SpO2 98%  BMI 23.89 kg/m2  Body mass index is 23.89 kg/(m^2).   GENERAL: healthy, alert, well nourished, well hydrated, no distress  HENT: ear canals- normal; TMs- normal; Nose- normal; Mouth- no ulcers, no lesions  NECK: no tenderness, no adenopathy, no asymmetry, no masses, no stiffness; thyroid- normal to palpation  RESP: lungs clear to auscultation - no rales, no rhonchi, no wheezes  CV: regular rates and rhythm, normal S1 S2, no S3 or S4 and no murmur, no click or rub -  ABDOMEN: soft, no tenderness, no  hepatosplenomegaly, no masses, normal bowel sounds  MS: extremities- no gross deformities noted, no edema  Results for orders placed or performed in visit on 07/31/17   Comprehensive metabolic panel   Result Value Ref Range    Sodium 128 (L) 133 - 144 mmol/L    Potassium 3.8 3.4 - 5.3 mmol/L    Chloride 96 94 - 109 mmol/L    Carbon Dioxide 24 20 - 32 mmol/L    Anion Gap 8 3 - 14 mmol/L    Glucose 98 70 - 99 mg/dL    Urea Nitrogen 12 7 - 30 mg/dL    Creatinine 0.97 0.52 - 1.04 mg/dL    GFR Estimate 57 (L) >60 mL/min/1.7m2    GFR Estimate If Black 68 >60 mL/min/1.7m2    Calcium " 9.0 8.5 - 10.1 mg/dL    Bilirubin Total 0.3 0.2 - 1.3 mg/dL    Albumin 3.8 3.4 - 5.0 g/dL    Protein Total 7.6 6.8 - 8.8 g/dL    Alkaline Phosphatase 98 40 - 150 U/L    ALT 13 0 - 50 U/L    AST 13 0 - 45 U/L   Lipid Profile   Result Value Ref Range    Cholesterol 200 (H) <200 mg/dL    Triglycerides 128 <150 mg/dL    HDL Cholesterol 70 >49 mg/dL    LDL Cholesterol Calculated 104 (H) <100 mg/dL    Non HDL Cholesterol 130 (H) <130 mg/dL   CBC with platelets   Result Value Ref Range    WBC 6.0 4.0 - 11.0 10e9/L    RBC Count 3.74 (L) 3.8 - 5.2 10e12/L    Hemoglobin 10.4 (L) 11.7 - 15.7 g/dL    Hematocrit 31.1 (L) 35.0 - 47.0 %    MCV 83 78 - 100 fl    MCH 27.8 26.5 - 33.0 pg    MCHC 33.4 31.5 - 36.5 g/dL    RDW 14.2 10.0 - 15.0 %    Platelet Count 300 150 - 450 10e9/L   Hepatitis C Screen Reflex to HCV RNA Quant and Genotype   Result Value Ref Range    Hepatitis C Antibody  NR     Nonreactive   Assay performance characteristics have not been established for newborns,   infants, and children              ASSESSMENT/PLAN:                                                    Labs reviewed.     -Liver and gallbladder tests (ALT,AST, Alk phos,bilirubin) are normal.   -Kidney function (GFR) is decreased.  ADVISE: recheck in 6 months (BMP, DX: 593.9 - unspecified disorder of kidney )   -Sodium is decreased.  ADVISE: recheck in 1 month (BMP, DX:hyponatremia).   -Potassium is normal.   -Glucose (diabetic screening test) is normal.    LDL cholesterol is flagged as high. However, this is a cut off only for people with chronic health conditions like diabetes, heart disease. For other individuals the goals for LDL cholesterol are higher, so I am happy with where LDL is.     ADVICE: The mediterranean diet has been shown to reduce LDL cholesterol and increase HDL cholesterol and has also been shown to reduce the risk of cardiovascular disease, cancer, and Alzheimer's dementia. This diet focuses on healthy fats (monounsatruated and  polyunsaturated) such that are found in fish, extra virgin olive oil, nuts, and avocados. It is also high in fruits & vegetables (7 servings per day) and whole grains. Regular exercise is also important in reducing cholesterol. Repeat cholesterol panel should be checked in 1 year again.     -Hemoglobin is decreased indicating anemia.  Anemia can cause fatigue and, occasionally, light-headedness.  ADVISE: additional studies to determine the exact cause of your anemia.  Please make an appointment with me within the next 2 weeks. Stool card is negative.     -White blood cell and platelet counts are normal.   -Hepatitis C antibody screen test shows no signs of a previous hepatitis C infection.     1. Essential hypertension with goal blood pressure less than 140/90  Well controlled. Resume current regimen. Fasting labs ordered.   - lisinopril-hydrochlorothiazide (PRINZIDE/ZESTORETIC) 20-25 MG per tablet; Take 1 tablet by mouth daily . Need appointment and lab for further refill  Dispense: 90 tablet; Refill: 3  - Comprehensive metabolic panel  - Lipid Profile    2. Osteopenia, unspecified location  Recommended to use calcium with Vit D.  - Calcium Carb-Cholecalciferol (CALCIUM 500 +D) 500-400 MG-UNIT TABS; Take 1 tablet by mouth 2 times daily    3. Anemia due to other cause    - CBC with platelets    4. Special screening for malignant neoplasms, colon    - Fecal colorectal cancer screen (FIT); negative.    5. Need for hepatitis C screening test    - Hepatitis C Screen Reflex to HCV RNA Quant and Genotype    6. Need for vaccination    - Pneumococcal vaccine 13 valent PCV13 IM (Prevnar) [[id number]]  - ADMIN MEDICARE: Pneumococcal Vaccine ()      Follow up with Provider - 6 months and prn     Jermain Arcos MD  [facility] [place]

## 2017-07-31 NOTE — NURSING NOTE
"Chief Complaint   Patient presents with     Recheck Medication       Initial /67 (BP Location: Right arm, Patient Position: Chair, Cuff Size: Adult Regular)  Pulse 84  Temp 98.4  F (36.9  C) (Tympanic)  Ht 5' 6\" (1.676 m)  Wt 148 lb (67.1 kg)  SpO2 98%  BMI 23.89 kg/m2 Estimated body mass index is 23.89 kg/(m^2) as calculated from the following:    Height as of this encounter: 5' 6\" (1.676 m).    Weight as of this encounter: 148 lb (67.1 kg).  Medication Reconciliation: complete     Katy Serrano CMA      "

## 2017-07-31 NOTE — MR AVS SNAPSHOT
After Visit Summary   7/31/2017    Trish Wu    MRN: [id number]           Patient Information     Date Of Birth          1945        Visit Information        Provider Department      7/31/2017 10:20 AM Jermain Arcos MD [facility]        Today's Diagnoses     Osteopenia, unspecified location    -  1    Essential hypertension with goal blood pressure less than 140/90        Special screening for malignant neoplasms, colon        Anemia due to other cause        Need for hepatitis C screening test           Follow-ups after your visit        Future tests that were ordered for you today     Open Future Orders        Priority Expected Expires Ordered    Fecal colorectal cancer screen (FIT) Routine 8/21/2017 10/23/2017 7/31/2017            Who to contact     If you have questions or need follow up information about today's clinic visit or your schedule please contact [facility] directly at [phone].  Normal or non-critical lab and imaging results will be communicated to you by MyChart, letter or phone within 4 business days after the clinic has received the results. If you do not hear from us within 7 days, please contact the clinic through My [employer] or phone. If you have a critical or abnormal lab result, we will notify you by phone as soon as possible.  Submit refill requests through [employer] or call your pharmacy and they will forward the refill request to us. Please allow 3 business days for your refill to be completed.          Additional Information About Your Visit        MyChart Information     [employer] gives you secure access to your electronic health record. If you see a primary care provider, you can also send messages to your care team and make appointments. If you have questions, please call your primary care clinic.  If you do not have a primary care provider, please call [phone] and they will assist you.        Care EveryWhere ID     This is  "your Care EveryWhere ID. This could be used by other organizations to access your [facility] medical records  [id number]        Your Vitals Were     Pulse Temperature Height Pulse Oximetry BMI (Body Mass Index)       84 98.4  F (36.9  C) (Tympanic) 5' 6\" (1.676 m) 98% 23.89 kg/m2        Blood Pressure from Last 3 Encounters:   07/31/17 130/67   06/22/17 112/68   05/15/17 118/64    Weight from Last 3 Encounters:   07/31/17 148 lb (67.1 kg)   06/22/17 140 lb (63.5 kg)   05/15/17 140 lb (63.5 kg)              We Performed the Following     CBC with platelets     Comprehensive metabolic panel     Hepatitis C Screen Reflex to HCV RNA Quant and Genotype     Lipid Profile          Today's Medication Changes          These changes are accurate as of: 7/31/17 11:10 AM.  If you have any questions, ask your nurse or doctor.               Start taking these medicines.        Dose/Directions    Calcium Carb-Cholecalciferol 500-400 MG-UNIT Tabs   Commonly known as:  calcium 500 +D   Used for:  Osteopenia, unspecified location   Started by:  Jermain Arcos MD        Dose:  1 tablet   Take 1 tablet by mouth 2 times daily   Refills:  0            Where to get your medicines      These medications were sent to [facility] [place] [address] [address]     Phone:  [phone]     lisinopril-hydrochlorothiazide 20-25 MG per tablet         Some of these will need a paper prescription and others can be bought over the counter.  Ask your nurse if you have questions.     You don't need a prescription for these medications     Calcium Carb-Cholecalciferol 500-400 MG-UNIT Tabs                Primary Care Provider Office Phone # Fax #    Jermain Arcos [phone]       [address]  [place] [address]        Equal Access to Services     JESSICA FLORENCE AH: Hadii hilaria Obrien, " lizeth metzger ah. So [facility] [phone].    ATENCIÓN: Si maría muñoz, tiene a valencia disposición servicios gratuitos de asistencia lingüística. [place] [phone].    We comply with applicable federal civil rights laws and [place] laws. We do not discriminate on the basis of race, color, national origin, age, disability sex, sexual orientation or gender identity.            Thank you!     Thank you for choosing [facility]  for your care. Our goal is always to provide you with excellent care. Hearing back from our patients is one way we can continue to improve our services. Please take a few minutes to complete the written survey that you may receive in the mail after your visit with us. Thank you!             Your Updated Medication List - Protect others around you: Learn how to safely use, store and throw away your medicines at www.disposemymeds.org.          This list is accurate as of: 7/31/17 11:10 AM.  Always use your most recent med list.                   Brand Name Dispense Instructions for use Diagnosis    alendronate 70 MG tablet    FOSAMAX    12 tablet    Take 1 tablet (70 mg) by mouth every 7 days Take 60 minutes before am meal with 8 oz. water. Remain upright for 30 minutes.    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 Need appointment and lab for further refill    Essential hypertension with goal blood pressure less  than 140/90       NASONEX 50 MCG/ACT spray   Generic drug:  mometasone      Spray 2 sprays into both nostrils daily

## 2017-08-01 PROBLEM — E87.1 HYPONATREMIA: Status: ACTIVE | Noted: 2017-08-01

## 2017-08-01 LAB
ALBUMIN SERPL-MCNC: 3.8 G/DL (ref 3.4–5)
ALP SERPL-CCNC: 98 U/L (ref 40–150)
ALT SERPL W P-5'-P-CCNC: 13 U/L (ref 0–50)
ANION GAP SERPL CALCULATED.3IONS-SCNC: 8 MMOL/L (ref 3–14)
AST SERPL W P-5'-P-CCNC: 13 U/L (ref 0–45)
BILIRUB SERPL-MCNC: 0.3 MG/DL (ref 0.2–1.3)
BUN SERPL-MCNC: 12 MG/DL (ref 7–30)
CALCIUM SERPL-MCNC: 9 MG/DL (ref 8.5–10.1)
CHLORIDE SERPL-SCNC: 96 MMOL/L (ref 94–109)
CHOLEST SERPL-MCNC: 200 MG/DL
CO2 SERPL-SCNC: 24 MMOL/L (ref 20–32)
CREAT SERPL-MCNC: 0.97 MG/DL (ref 0.52–1.04)
GFR SERPL CREATININE-BSD FRML MDRD: 57 ML/MIN/1.7M2
GLUCOSE SERPL-MCNC: 98 MG/DL (ref 70–99)
HCV AB SERPL QL IA: NORMAL
HDLC SERPL-MCNC: 70 MG/DL
LDLC SERPL CALC-MCNC: 104 MG/DL
NONHDLC SERPL-MCNC: 130 MG/DL
POTASSIUM SERPL-SCNC: 3.8 MMOL/L (ref 3.4–5.3)
PROT SERPL-MCNC: 7.6 G/DL (ref 6.8–8.8)
SODIUM SERPL-SCNC: 128 MMOL/L (ref 133–144)
TRIGL SERPL-MCNC: 128 MG/DL

## 2017-08-02 ENCOUNTER — TELEPHONE (OUTPATIENT)
Dept: ORTHOPEDICS | Facility: CLINIC | Age: 72
End: 2017-08-02

## 2017-08-02 DIAGNOSIS — M51.369 DDD (DEGENERATIVE DISC DISEASE), LUMBAR: ICD-10-CM

## 2017-08-02 DIAGNOSIS — M24.152 DEGENERATIVE TEAR OF ACETABULAR LABRUM OF LEFT HIP: ICD-10-CM

## 2017-08-02 PROCEDURE — 82274 ASSAY TEST FOR BLOOD FECAL: CPT | Performed by: FAMILY MEDICINE

## 2017-08-02 RX ORDER — HYDROCODONE BITARTRATE AND ACETAMINOPHEN 5; 325 MG/1; MG/1
TABLET ORAL
Qty: 60 TABLET | Refills: 0 | Status: SHIPPED | OUTPATIENT
Start: 2017-08-02 | End: 2018-04-20

## 2017-08-02 NOTE — TELEPHONE ENCOUNTER
Medication request received:   Summary: Take 1-2 tabs every 6 hours as needed for low back pain, Disp-30 tablet, R-0, Local Print   Start: 6/22/2017  Ord/Sold: 6/22/2017 (O)  Report  Taking:   Long-term:   Pharmacy: [facility] [place], [place] - [address]  Med Dose History  Change        Patient Sig: Take 1-2 tabs every 6 hours as needed for low back pain       Ordered on: 6/22/2017       Authorized by: SLIME BUENO       Dispense: 30 tablet       Admin Instructions: Take 1-2 tabs every 6 hours as needed for low back pain        Patient is leaving for [place] for 6 months and is requesting 60-90 pills to hold her over while she is away.     Selected pharmacy in [facility]     Please advise   Aida Murray ATC

## 2017-08-03 DIAGNOSIS — Z12.11 SPECIAL SCREENING FOR MALIGNANT NEOPLASMS, COLON: ICD-10-CM

## 2017-08-04 LAB — HEMOCCULT STL QL IA: NEGATIVE

## 2018-04-16 ENCOUNTER — HOSPITAL ENCOUNTER (EMERGENCY)
Facility: CLINIC | Age: 73
Discharge: HOME | End: 2018-04-16
Attending: EMERGENCY MEDICINE | Admitting: EMERGENCY MEDICINE
Payer: COMMERCIAL

## 2018-04-16 VITALS
HEIGHT: 66 IN | HEART RATE: 95 BPM | BODY MASS INDEX: 22.5 KG/M2 | DIASTOLIC BLOOD PRESSURE: 67 MMHG | RESPIRATION RATE: 18 BRPM | WEIGHT: 140 LBS | SYSTOLIC BLOOD PRESSURE: 145 MMHG | OXYGEN SATURATION: 99 % | TEMPERATURE: 98.3 F

## 2018-04-16 DIAGNOSIS — R20.9 DISTURBANCE OF SKIN SENSATION: ICD-10-CM

## 2018-04-16 PROCEDURE — 99282 EMERGENCY DEPT VISIT SF MDM: CPT

## 2018-04-16 ASSESSMENT — ENCOUNTER SYMPTOMS
WEAKNESS: 0
BACK PAIN: 0
ABDOMINAL PAIN: 0
MYALGIAS: 1
NUMBNESS: 1

## 2018-04-16 NOTE — ED PROVIDER NOTES
"  History     Chief Complaint:  Leg Pain     HPI   Trish Wu is a 73 year old female, with a history of AAA and hypertension, who presents to the ED for evaluation of right leg pain. The patient reports her entire right leg pain and numbness began a week ago. The pain/numbness initially worsened with walking and now improves with walking. The pain/numbness progressively resolved, and now there is only numbness localized in her foot and toes. She has never had pain or numbness in the right leg before. The patient notes she recently returned from [facility] where she did walk more for 5 days. The patient denies any recent falls, trauma, facial droop, upper extremity pain/numbness, abdominal pain, new back/buttock pain, leg weakness, or foot drop.      Allergies:  Penicillins      Medications:    Calcium+D  Lisinopril-hydrochlorothiazide  Fosamax    Past Medical History:    Lumbago  AAA  HTN    Past Surgical History:    Bilateral cataract IOL  EUNICE  Mammoplasty augmentation  Vagotomy & pyloroplasty    Family History:    Osteoporosis  Type 2 diabetes     Social History:  Smoking status: Former smoker  Alcohol use: Rarely  Presents to ED alone    Marital Status:   [5]     Review of Systems   Gastrointestinal: Negative for abdominal pain.   Musculoskeletal: Positive for myalgias. Negative for back pain.   Neurological: Positive for numbness. Negative for weakness.   All other systems reviewed and are negative.    Physical Exam     Patient Vitals for the past 24 hrs:   BP Temp Temp src Pulse Resp SpO2 Height Weight   04/16/18 1121 145/67 98.3  F (36.8  C) Temporal 95 18 99 % 1.676 m (5' 6\") 63.5 kg (140 lb)     Physical Exam  Nursing note and vitals reviewed.    Constitutional:  Appears well-developed and well-nourished, comfortable.    HENT:    Nose normal.  No discharge     Oropharynx is clear and moist.  Eyes:    Conjunctivae are normal without injection. No lid droop.     Pupils are equal, round, and reactive to " light.      Right eye exhibits no discharge. Left eye exhibits no discharge.      No scleral icterus  Cardiovascular:  Normal rate, regular rhythm with normal S1 and S2.      Normal heart sounds and peripheral pulses in her feet are 1+ and equal.       No murmur or gigi.  Capillary refill is 2 seconds in her right toes.  Pulmonary:  Effort normal and breath sounds clear to auscultation bilaterally. No respiratory distress.  No stridor.     No wheezes. No rales. No chest wall tenderness.    GI:    Soft. No distension and no mass. No tenderness.      No rebound and no guarding. No flank pain.  No HSM.  Musculoskeletal:  Normal range of motion. No extremity deformity.     No edema and no tenderness.  No cyanosis.                                       Neck supple, no midline cervical tenderness.   Neurological:   GCS 15. NIH stroke scale score 0. O X 3. Normal strength but decreased sensation in the right toes only.    Normal coordination. Normal finger to nose. Normal heel-knee-shin. No hand drift. No leg drift. EOMs intact.      No facial droop. No slurred speech. Mental status and memory normal. Comprehension and expression of    speech is normal.   Skin:    Skin is warm and dry. No rash noted. No diaphoresis.      No erythema. No pallor.  No lesions.  Psychiatric:   Behavior is normal. Appropriate mood and affect.     Judgment and thought content normal.     Emergency Department Course     Emergency Department Course:  Past medical records, nursing notes, and vitals reviewed.  1328: I performed an exam of the patient and obtained history, as documented above.    1338: I consulted Selina Milton NP, of neurology.     1346: I rechecked the patient. Explained plan to patient.    Findings and plan explained to the Patient. Patient discharged home with instructions regarding supportive care, medications, and reasons to return. The importance of close follow-up was reviewed.     Impression & Plan      Medical  Decision Making:  Patient presents with numbness that was in her entire right leg with pain over the past week, has significantly improved where now the only symptom is some numbness in her right toes.  She does have a history of some back problems and chronic pain but nothing new.  She did admit to walking a lot for 5 days prior to this starting.  She has had no other symptoms that would suggest a stroke.  Her neurologic exam is otherwise completely normal other than the subjective sensation of decreased feeling in her toes.  Has good pulses.  She does have a history of a AAA but has had no abdominal pain and her aneurysm is about 4 cm.  I discussed the case with Selina from neurology and she agrees that with the pain that she would get in the leg along with the descending symptoms and improving symptoms, that this is unlikely to be stroke.  The patient does not want an MRI at this time and I feel this can be watched as an outpatient and I expect it to resolve completely over the next few days.  If she gets worse or develops any stroke symptoms she is to return to the ER.    Diagnosis:    ICD-10-CM    1. Disturbance of skin sensation R20.9     right leg, resolving     Disposition: Patient discharged to home. Set an appointment up with your doctor in the clinic this week for a recheck.  If you develop worsening numbness, or weakness on one side of the body, difficulty speaking, etc., return to the ER.     Rocio Puckett  4/16/2018    EMERGENCY DEPARTMENT    I, Rocio Puckett, am serving as a scribe at 1:28 PM on 4/16/2018 to document services personally performed by Fatimah Newell MD based on my observations and the provider's statements to me.        Fatimah Newell MD  04/16/18 2759

## 2018-04-16 NOTE — DISCHARGE INSTRUCTIONS
Set an appointment up with your doctor in the clinic this week for a recheck.  If you develop worsening numbness, or weakness on one side of the body, difficulty speaking, etc., return to the ER.

## 2018-04-16 NOTE — ED AVS SNAPSHOT
Emergency Department    [address] [facility] [address]    Phone:  [phone]    Fax:  [phone]                                       Trish Wu   MRN: [id number]    Department:   Emergency Department   Date of Visit:  4/16/2018           Patient Information     Date Of Birth          1945        Your diagnoses for this visit were:     Disturbance of skin sensation right leg, resolving       You were seen by Fatimah Newell MD.      Follow-up Information     Schedule an appointment as soon as possible for a visit with Jermain Arcos MD.    Specialty:  Family Practice    Contact information:    [address] [place] [address] [address]  [place] [id number] [phone]          Discharge Instructions       Set an appointment up with your doctor in the clinic this week for a recheck.  If you develop worsening numbness, or weakness on one side of the body, difficulty speaking, etc., return to the ER.        Discharge References/Attachments     PARAESTHESIAS (ENGLISH)      24 Hour Appointment Hotline       To make an appointment at any [facility], call [phone] ([phone]). If you don't have a family doctor or clinic, we will help you find one. [facility] clinics are conveniently located to serve the needs of you and your family.             Review of your medicines      Our records show that you are taking the medicines listed below. If these are incorrect, please call your family doctor or clinic.        Dose / Directions Last dose taken    alendronate 70 MG tablet   Commonly known as:  FOSAMAX   Dose:  70 mg   Quantity:  12 tablet        Take 1 tablet (70 mg) by mouth every 7 days Take 60 minutes before am meal with 8 oz. water. Remain upright for 30 minutes.   Refills:  3        Calcium Carb-Cholecalciferol 500-400 MG-UNIT Tabs   Commonly known as:  calcium 500 +D   Dose:  1 tablet        Take 1 tablet by mouth 2 times daily   Refills:  0        HYDROcodone-acetaminophen 5-325 MG  per tablet   Commonly known as:  NORCO   Quantity:  60 tablet        Take 1-2 tabs every 6 hours as needed for low back pain   Refills:  0        lisinopril-hydrochlorothiazide 20-25 MG per tablet   Commonly known as:  PRINZIDE/ZESTORETIC   Dose:  1 tablet   Quantity:  90 tablet        Take 1 tablet by mouth daily . Need appointment and lab for further refill   Refills:  3        NASONEX 50 MCG/ACT spray   Dose:  2 spray   Generic drug:  mometasone        Spray 2 sprays into both nostrils daily   Refills:  0                Orders Needing Specimen Collection     None      Pending Results     No orders found from 4/14/2018 to 4/17/2018.            Pending Culture Results     No orders found from 4/14/2018 to 4/17/2018.            Pending Results Instructions     If you had any lab results that were not finalized at the time of your Discharge, you can call the ED Lab Result RN at [phone]. You will be contacted by this team for any positive Lab results or changes in treatment. The nurses are available 7 days a week from 10A to 6:30P.  You can leave a message 24 hours per day and they will return your call.        Test Results From Your Hospital Stay               Clinical Quality Measure: Blood Pressure Screening     Your blood pressure was checked while you were in the emergency department today. The last reading we obtained was  BP: 145/67 . Please read the guidelines below about what these numbers mean and what you should do about them.  If your systolic blood pressure (the top number) is less than 120 and your diastolic blood pressure (the bottom number) is less than 80, then your blood pressure is normal. There is nothing more that you need to do about it.  If your systolic blood pressure (the top number) is 120-139 or your diastolic blood pressure (the bottom number) is 80-89, your blood pressure may be higher than it should be. You should have your blood pressure rechecked within a year by a primary care  provider.  If your systolic blood pressure (the top number) is 140 or greater or your diastolic blood pressure (the bottom number) is 90 or greater, you may have high blood pressure. High blood pressure is treatable, but if left untreated over time it can put you at risk for heart attack, stroke, or kidney failure. You should have your blood pressure rechecked by a primary care provider within the next 4 weeks.  If your provider in the emergency department today gave you specific instructions to follow-up with your doctor or provider even sooner than that, you should follow that instruction and not wait for up to 4 weeks for your follow-up visit.        Thank you for choosing [facility]       Thank you for choosing [facility] for your care. Our goal is always to provide you with excellent care. Hearing back from our patients is one way we can continue to improve our services. Please take a few minutes to complete the written survey that you may receive in the mail after you visit with us. Thank you!        [email or web address] Information     [employer] gives you secure access to your electronic health record. If you see a primary care provider, you can also send messages to your care team and make appointments. If you have questions, please call your primary care clinic.  If you do not have a primary care provider, please call [phone] and they will assist you.        Care EveryWhere ID     This is your Care EveryWhere ID. This could be used by other organizations to access your [facility] medical records  [id number]        Equal Access to Services     JESSICA FLORENCE : Hadii ludmila Villatoro, wajenniferda da, qaybta kaallizeth esquivel. So [facility] [phone].    ATENCIÓN: Si habla español, tiene a valencia disposición servicios gratuitos de asistencia lingüística. [place] al [phone].    We comply with applicable federal civil rights laws and [place] laws. We do not discriminate on the  basis of race, color, national origin, age, disability, sex, sexual orientation, or gender identity.            After Visit Summary       This is your record. Keep this with you and show to your community pharmacist(s) and doctor(s) at your next visit.

## 2018-04-16 NOTE — ED AVS SNAPSHOT
Emergency Department    [address] [facility] [address]    Phone:  [phone]    Fax:  [phone]                                       Trish Wu   MRN: [id number]    Department:   Emergency Department   Date of Visit:  4/16/2018           After Visit Summary Signature Page     I have received my discharge instructions, and my questions have been answered. I have discussed any challenges I see with this plan with the nurse or doctor.    ..........................................................................................................................................  Patient/Patient Representative Signature      ..........................................................................................................................................  Patient Representative Print Name and Relationship to Patient    ..................................................               ................................................  Date                                            Time    ..........................................................................................................................................  Reviewed by Signature/Title    ...................................................              ..............................................  Date                                                            Time

## 2018-04-20 ENCOUNTER — OFFICE VISIT (OUTPATIENT)
Dept: FAMILY MEDICINE | Facility: CLINIC | Age: 73
End: 2018-04-20
Payer: COMMERCIAL

## 2018-04-20 VITALS
SYSTOLIC BLOOD PRESSURE: 116 MMHG | HEIGHT: 66 IN | OXYGEN SATURATION: 96 % | HEART RATE: 101 BPM | WEIGHT: 130 LBS | DIASTOLIC BLOOD PRESSURE: 65 MMHG | BODY MASS INDEX: 20.89 KG/M2 | TEMPERATURE: 98.1 F

## 2018-04-20 DIAGNOSIS — K59.00 CONSTIPATION, UNSPECIFIED CONSTIPATION TYPE: ICD-10-CM

## 2018-04-20 DIAGNOSIS — R20.2 PARESTHESIA: Primary | ICD-10-CM

## 2018-04-20 PROCEDURE — 99214 OFFICE O/P EST MOD 30 MIN: CPT | Performed by: FAMILY MEDICINE

## 2018-04-20 RX ORDER — HYDROCODONE BITARTRATE AND ACETAMINOPHEN 5; 325 MG/1; MG/1
TABLET ORAL
Qty: 30 TABLET | Refills: 0 | Status: SHIPPED | OUTPATIENT
Start: 2018-04-20 | End: 2018-05-11

## 2018-04-20 NOTE — MR AVS SNAPSHOT
"              After Visit Summary   4/20/2018    Trish Wu    MRN: [id number]           Patient Information     Date Of Birth          1945        Visit Information        Provider Department      4/20/2018 9:00 AM Jermain Arcos MD [facility] [place]        Today's Diagnoses     Paresthesia    -  1    Constipation, unspecified constipation type           Follow-ups after your visit        Follow-up notes from your care team     Return in about 3 months (around 7/24/2018) for Annual Physical Exam.      Who to contact     If you have questions or need follow up information about today's clinic visit or your schedule please contact [facility] [place] directly at [phone].  Normal or non-critical lab and imaging results will be communicated to you by MyChart, letter or phone within 4 business days after the clinic has received the results. If you do not hear from us within 7 days, please contact the clinic through [employer] or phone. If you have a critical or abnormal lab result, we will notify you by phone as soon as possible.  Submit refill requests through [employer] or call your pharmacy and they will forward the refill request to us. Please allow 3 business days for your refill to be completed.          Additional Information About Your Visit        MyChart Information     [employer] gives you secure access to your electronic health record. If you see a primary care provider, you can also send messages to your care team and make appointments. If you have questions, please call your primary care clinic.  If you do not have a primary care provider, please call [phone] and they will assist you.        Care EveryWhere ID     This is your Care EveryWhere ID. This could be used by other organizations to access your [facility] medical records  [id number]        Your Vitals Were     Pulse Temperature Height Pulse Oximetry BMI (Body Mass Index)       101 98.1  F (36.7  C) (Tympanic) 5' 6\" " (1.676 m) 96% 20.98 kg/m2        Blood Pressure from Last 3 Encounters:   04/20/18 116/65   04/16/18 145/67   07/31/17 130/67    Weight from Last 3 Encounters:   04/20/18 130 lb (59 kg)   04/16/18 140 lb (63.5 kg)   07/31/17 148 lb (67.1 kg)              Today, you had the following     No orders found for display         Today's Medication Changes          These changes are accurate as of 4/20/18  9:26 AM.  If you have any questions, ask your nurse or doctor.               These medicines have changed or have updated prescriptions.        Dose/Directions    HYDROcodone-acetaminophen 5-325 MG per tablet   Commonly known as:  NORCO   This may have changed:  additional instructions   Changed by:  Jermain Arcos MD        Take 1 tab QHS as needed for right leg pain   Quantity:  30 tablet   Refills:  0         Stop taking these medicines if you haven't already. Please contact your care team if you have questions.     alendronate 70 MG tablet   Commonly known as:  FOSAMAX   Stopped by:  Jermain Arcos MD           NASONEX 50 MCG/ACT spray   Generic drug:  mometasone   Stopped by:  Jermain Arcos MD                Where to get your medicines      Some of these will need a paper prescription and others can be bought over the counter.  Ask your nurse if you have questions.     Bring a paper prescription for each of these medications     HYDROcodone-acetaminophen 5-325 MG per tablet               Information about OPIOIDS     PRESCRIPTION OPIOIDS: WHAT YOU NEED TO KNOW   You have a prescription for an opioid (narcotic) pain medicine. Opioids can cause addiction. If you have a history of chemical dependency of any type, you are at a higher risk of becoming addicted to opioids. Only take this medicine after all other options have been tried. Take it for as short a time and as few doses as possible.     Do not:    Drive. If you drive while taking these medicines, you could be arrested for driving under the influence  (DUI).    Operate heavy machinery    Do any other dangerous activities while taking these medicines.     Drink any alcohol while taking these medicines.      Take with any other medicines that contain acetaminophen. Read all labels carefully. Look for the word  acetaminophen  or  Tylenol.  Ask your pharmacist if you have questions or are unsure.    Store your pills in a secure place, locked if possible. We will not replace any lost or stolen medicine. If you don t finish your medicine, please throw away (dispose) as directed by your pharmacist. The [place] [employer]ution [employer] has more information about safe disposal: https://www.pca.[facility].[place].us/living-green/managing-unwanted-medications    All opioids tend to cause constipation. Drink plenty of water and eat foods that have a lot of fiber, such as fruits, vegetables, prune juice, apple juice and high-fiber cereal. Take a laxative (Miralax, milk of magnesia, Colace, Senna) if you don t move your bowels at least every other day.          Primary Care Provider Office Phone # Fax #    Jermain Arcos [phone]        [facility]  [place] [address]        Equal Access to Services     [facility] : Hadii aad ku hadasho Soevie, waaxda luqadaha, qaybta kaalmada adegavin, lizeth brito . So [facility] [phone].    ATENCIÓN: Si habla español, tiene a valencia disposición servicios gratuitos de asistencia lingüística. Llame al [phone].    We comply with applicable federal civil rights laws and [place] laws. We do not discriminate on the basis of race, color, national origin, age, disability, sex, sexual orientation, or gender identity.            Thank you!     Thank you for choosing [facility] [place]  for your care. Our goal is always to provide you with excellent care. Hearing back from our patients is one way we can continue to improve our services. Please take a few minutes to complete the  written survey that you may receive in the mail after your visit with us. Thank you!             Your Updated Medication List - Protect others around you: Learn how to safely use, store and throw away your medicines at www.disposemymeds.org.          This list is accurate as of 4/20/18  9:26 AM.  Always use your most recent med list.                   Brand Name Dispense Instructions for use Diagnosis    Calcium Carb-Cholecalciferol 500-400 MG-UNIT Tabs    calcium 500 +D     Take 1 tablet by mouth 2 times daily    Osteopenia, unspecified location       HYDROcodone-acetaminophen 5-325 MG per tablet    NORCO    30 tablet    Take 1 tab QHS as needed for right leg pain        lisinopril-hydrochlorothiazide 20-25 MG per tablet    PRINZIDE/ZESTORETIC    90 tablet    Take 1 tablet by mouth daily . Need appointment and lab for further refill    Essential hypertension with goal blood pressure less than 140/90

## 2018-04-20 NOTE — NURSING NOTE
"Chief Complaint   Patient presents with     Hospital F/U     ER VISIT        Initial /65  Pulse 101  Temp 98.1  F (36.7  C) (Tympanic)  Ht 5' 6\" (1.676 m)  Wt 130 lb (59 kg)  SpO2 96%  BMI 20.98 kg/m2 Estimated body mass index is 20.98 kg/(m^2) as calculated from the following:    Height as of this encounter: 5' 6\" (1.676 m).    Weight as of this encounter: 130 lb (59 kg).  Medication Reconciliation: complete  "

## 2018-04-20 NOTE — PROGRESS NOTES
SUBJECTIVE:   Trish Wu is a 73 year old female who presents to clinic today for the following health issues:      ED/UC Followup:    Facility:  [facility] ED   Date of visit: 4/16/2018  Reason for visit: Leg pain/Numbness   Current Status: currently having shooting pain, tingling and numbness in right foot      ER records reviewed.  Patient reports of feeling ongoing discomfort on the medial side of the right lower leg just above the ankle.  Denies any redness, swelling.  No ecchymosis noted.  Previously she had discomfort in the whole right side but that has resolved except for this one spot.  Pain is not well managed.  Would like some pain medication.  Emergency room physician discussed the situation with the neurology physician.  No MRI was recommended.  Patient denies any weakness, she is able to walk okay without any problems.    Denies any trauma.  No current back pain.  She has history of AAA which has been stable.  Hypertension is well controlled.        Problem list and histories reviewed & adjusted, as indicated.  Additional history: as documented    Patient Active Problem List   Diagnosis     Essential hypertension with goal blood pressure less than 140/90     Environmental allergies     Hip pain, left     Osteopenia     Aortic aneurysm (H)     Lumbago     Hyponatremia     Past Surgical History:   Procedure Laterality Date     CATARACT IOL, RT/LT       HYSTERECTOMY TOTAL ABDOMINAL  1966     MAMMOPLASTY AUGMENTATION       VAGOTOMY, PYLOROPLASTY, COMBINED         Social History   Substance Use Topics     Smoking status: Former Smoker     Smokeless tobacco: Never Used     Alcohol use 0.0 oz/week     0 Standard drinks or equivalent per week      Comment: rare     Family History   Problem Relation Age of Onset     OSTEOPOROSIS Mother      Type 2 Diabetes Son          Current Outpatient Prescriptions   Medication Sig Dispense Refill     Calcium Carb-Cholecalciferol (CALCIUM 500 +D) 500-400 MG-UNIT  "TABS Take 1 tablet by mouth 2 times daily       HYDROcodone-acetaminophen (NORCO) 5-325 MG per tablet Take 1 tab QHS as needed for right leg pain 30 tablet 0     lisinopril-hydrochlorothiazide (PRINZIDE/ZESTORETIC) 20-25 MG per tablet Take 1 tablet by mouth daily . Need appointment and lab for further refill 90 tablet 3     Allergies   Allergen Reactions     Penicillins Hives       Reviewed and updated as needed this visit by clinical staff       Reviewed and updated as needed this visit by Provider         ROS:  RESP: NEGATIVE for significant cough or SOB  CV: NEGATIVE for chest pain, palpitations or peripheral edema    OBJECTIVE:                                                    /65  Pulse 101  Temp 98.1  F (36.7  C) (Tympanic)  Ht 5' 6\" (1.676 m)  Wt 130 lb (59 kg)  SpO2 96%  BMI 20.98 kg/m2  Body mass index is 20.98 kg/(m^2).   GENERAL: healthy, alert, well nourished, well hydrated, no distress  RESP: lungs clear to auscultation - no rales, no rhonchi, no wheezes  CV: regular rates and rhythm, normal S1 S2, no S3 or S4 and no murmur, no click or rub -  ABDOMEN: soft, no tenderness, no  hepatosplenomegaly, no masses, normal bowel sounds  MS: extremities- no gross deformities noted, no edema.  Patient reports that the area of numbness and discomfort on the right lower leg on the medial surface, just above the ankle.    MS:Range of motion of the ankle is normal.  SKIN: no suspicious lesions, no rashes  NEURO: strength and tone- normal, sensory exam- grossly normal, mentation- intact, speech- normal, reflexes- symmetric         ASSESSMENT/PLAN:                                                    1. Paresthesia  Patient has significant improvement of paresthesias in the right lower extremity as compared to when it started.  Now she has localized area on the right lower leg on the medial surface, just above the ankle.  Patient would like to give it another few weeks to see if symptoms improve or not.  If " symptoms are not improving, I would recommend Nerve conduction study.  For now ordering pain medication only for nighttime use as needed for the discomfort associated with the paresthesia in the right lower extremity.  Instructed to follow-up if symptomatic worsening or no improvement noted in the next few week      - HYDROcodone-acetaminophen (NORCO) 5-325 MG per tablet; Take 1 tab QHS as needed for right leg pain  Dispense: 30 tablet; Refill: 0    2. Constipation, unspecified constipation type  Recommended to use over-the-counter stool softeners or MiraLAX for constipation as needed.      Jermain Arcos MD  [facility]

## 2018-04-27 ENCOUNTER — OFFICE VISIT (OUTPATIENT)
Dept: FAMILY MEDICINE | Facility: CLINIC | Age: 73
End: 2018-04-27
Payer: COMMERCIAL

## 2018-04-27 ENCOUNTER — TELEPHONE (OUTPATIENT)
Dept: OTHER | Facility: CLINIC | Age: 73
End: 2018-04-27

## 2018-04-27 VITALS
WEIGHT: 132 LBS | HEART RATE: 65 BPM | DIASTOLIC BLOOD PRESSURE: 62 MMHG | TEMPERATURE: 97.7 F | BODY MASS INDEX: 21.31 KG/M2 | SYSTOLIC BLOOD PRESSURE: 120 MMHG

## 2018-04-27 DIAGNOSIS — R20.0 NUMBNESS OF RIGHT FOOT: ICD-10-CM

## 2018-04-27 DIAGNOSIS — M79.671 ARCH PAIN, RIGHT: Primary | ICD-10-CM

## 2018-04-27 PROCEDURE — 99214 OFFICE O/P EST MOD 30 MIN: CPT | Performed by: INTERNAL MEDICINE

## 2018-04-27 RX ORDER — GABAPENTIN 300 MG/1
300 CAPSULE ORAL AT BEDTIME
Qty: 30 CAPSULE | Refills: 3 | Status: SHIPPED | OUTPATIENT
Start: 2018-04-27 | End: 2018-05-11

## 2018-04-27 NOTE — NURSING NOTE
"Chief Complaint   Patient presents with     Musculoskeletal Problem       Initial /62 (BP Location: Left arm, Patient Position: Chair, Cuff Size: Adult Regular)  Pulse 65  Temp 97.7  F (36.5  C) (Tympanic)  Wt 132 lb (59.9 kg)  BMI 21.31 kg/m2 Estimated body mass index is 21.31 kg/(m^2) as calculated from the following:    Height as of 4/20/18: 5' 6\" (1.676 m).    Weight as of this encounter: 132 lb (59.9 kg).  Medication Reconciliation: complete  "

## 2018-04-27 NOTE — MR AVS SNAPSHOT
After Visit Summary   4/27/2018    Trish Wu    MRN: [id number]           Patient Information     Date Of Birth          1945        Visit Information        Provider Department      4/27/2018 1:00 PM Darline Childs MD [facility] [place]        Today's Diagnoses     Arch pain, right    -  1    Numbness of right foot          Care Instructions    Try capsaicin cream for the foot.            Follow-ups after your visit        Additional Services     NEUROLOGY ADULT REFERRAL       Your provider has referred you for the following:   EMG at [facility]: [facility]  [place] [phone]   [email or web address]    Please be aware that coverage of these services is subject to the terms and limitations of your health insurance plan.  Call member services at your health plan with any benefit or coverage questions.      Please bring the following with you to your appointment:    (1) Any X-Rays, CTs or MRIs which have been performed.  Contact the facility where they were done to arrange for  prior to your scheduled appointment.    (2) List of current medications  (3) This referral request   (4) Any documents/labs given to you for this referral            ORTHO  REFERRAL       [facility] is referring you to the Orthopedic  Services at [facility].       The  Representative will assist you in the coordination of your Orthopedic and Musculoskeletal Care as prescribed by your physician.    The  Representative will call you within 1 business day to help schedule your appointment, or you may contact the  Representative at:    All areas ~ [phone]     Type of Referral : [facility]       Timeframe requested: 3 - 5 days    Coverage of these services is subject to the terms and limitations of your health insurance plan.  Please call  member services at your health plan with any benefit or coverage questions.      If X-rays, CT or MRI's have been performed, please contact the facility where they were done to arrange for , prior to your scheduled appointment.  Please bring this referral request to your appointment and present it to your specialist.                  Follow-up notes from your care team     Return for with podiatry .      Who to contact     If you have questions or need follow up information about today's clinic visit or your schedule please contact [facility] [place] directly at [phone].  Normal or non-critical lab and imaging results will be communicated to you by [employer], letter or phone within 4 business days after the clinic has received the results. If you do not hear from us within 7 days, please contact the clinic through [employer] or phone. If you have a critical or abnormal lab result, we will notify you by phone as soon as possible.  Submit refill requests through [facility] or call your pharmacy and they will forward the refill request to us. Please allow 3 business days for your refill to be completed.          Additional Information About Your Visit        [employer] Information     [facility] gives you secure access to your electronic health record. If you see a primary care provider, you can also send messages to your care team and make appointments. If you have questions, please call your primary care clinic.  If you do not have a primary care provider, please call [phone] and they will assist you.        Care EveryWhere ID     This is your Care EveryWhere ID. This could be used by other organizations to access your [place] medical records  [id number]        Your Vitals Were     Pulse Temperature BMI (Body Mass Index)             65 97.7  F (36.5  C) (Tympanic) 21.31 kg/m2          Blood Pressure from Last 3 Encounters:   04/27/18 120/62   04/20/18 116/65   04/16/18 145/67    Weight from Last 3  Encounters:   04/27/18 132 lb (59.9 kg)   04/20/18 130 lb (59 kg)   04/16/18 140 lb (63.5 kg)              We Performed the Following     NEUROLOGY ADULT REFERRAL     ORTHO  REFERRAL          Today's Medication Changes          These changes are accurate as of 4/27/18  1:17 PM.  If you have any questions, ask your nurse or doctor.               Start taking these medicines.        Dose/Directions    gabapentin 300 MG capsule   Commonly known as:  NEURONTIN   Used for:  Numbness of right foot   Started by:  Darline Childs MD        Dose:  300 mg   Take 1 capsule (300 mg) by mouth At Bedtime   Quantity:  30 capsule   Refills:  3            Where to get your medicines      These medications were sent to [facility] [place] [facility] [place] [facility]  [address] [facility], [place] [address]     Phone:  [phone]     gabapentin 300 MG capsule                Primary Care Provider Office Phone # Fax #    Jermain Arcos [phone]       [address] [place] [address] [address]  [place] [address]        Equal Access to Services     [facility]: Hadii aad ku hadasho Soomaali, waaxda luqadaha, qaybta kaalmada adeegyada, lizeth wing haynorah brito . So [facility] [phone].    ATENCIÓN: Si habla español, tiene a valencia disposición servicios gratuitos de asistencia lingüística. [facility] [phone].    We comply with applicable federal civil rights laws and [place] laws. We do not discriminate on the basis of race, color, national origin, age, disability, sex, sexual orientation, or gender identity.            Thank you!     Thank you for choosing [facility] [place]  for your care. Our goal is always to provide you with excellent care. Hearing back from our patients is one way we can continue to improve our services. Please take a few minutes to complete the written survey that you may receive in the mail after your visit with us. Thank you!              Your Updated Medication List - Protect others around you: Learn how to safely use, store and throw away your medicines at www.disposemymeds.org.          This list is accurate as of 4/27/18  1:17 PM.  Always use your most recent med list.                   Brand Name Dispense Instructions for use Diagnosis    Calcium Carb-Cholecalciferol 500-400 MG-UNIT Tabs    calcium 500 +D     Take 1 tablet by mouth 2 times daily    Osteopenia, unspecified location       gabapentin 300 MG capsule    NEURONTIN    30 capsule    Take 1 capsule (300 mg) by mouth At Bedtime    Numbness of right foot       HYDROcodone-acetaminophen 5-325 MG per tablet    NORCO    30 tablet    Take 1 tab QHS as needed for right leg pain    Paresthesia       lisinopril-hydrochlorothiazide 20-25 MG per tablet    PRINZIDE/ZESTORETIC    90 tablet    Take 1 tablet by mouth daily . Need appointment and lab for further refill    Essential hypertension with goal blood pressure less than 140/90

## 2018-04-27 NOTE — PROGRESS NOTES
SUBJECTIVE:   Trish Wu is a 73 year old female who presents to clinic today for the following health issues:    Trish is here with pain and numbness/tingling in her right foot.  Symptoms initially started 3 or 4 weeks ago, the whole right leg felt numb.  This started on a vacation to [place].  She went to the emergency room on 4/16, they felt there were no acute issues such as stroke, DVT, or issues with abdominal aneurysm.  The leg is overall feeling better, symptoms have concentrated in her foot.   She saw Dr. Arcos last week and was given prescription for hydrocodone.  She is taking a half tab as needed, helps a little bit but wears off quickly.  The sensation is keeping her up at night.  She describes a pain in the middle of her arch and a numbness/tingling in her toes in her heel.  Feels like the foot is very tight and puffy, but it has not looked swollen. She denies any recent injuries.  Has not had this symptom before.          Reviewed and updated as needed this visit by clinical staff  Tobacco  Allergies  Meds       Reviewed and updated as needed this visit by Provider         ROS:  Msk, neuro, skin reviewed,  otherwise negative unless noted above.       OBJECTIVE:     /62 (BP Location: Left arm, Patient Position: Chair, Cuff Size: Adult Regular)  Pulse 65  Temp 97.7  F (36.5  C) (Tympanic)  Wt 132 lb (59.9 kg)  BMI 21.31 kg/m2  Body mass index is 21.31 kg/(m^2).      Gen: pleasant, well appearing woman, no distress  R foot: no swelling, 2+ DP pulse.  TTP middle of the arch, no bony tenderness.  Cannot feel monofilament on plantar toes or ball of foot.         ASSESSMENT/PLAN:         ICD-10-CM    1. Arch pain, right M79.671 ORTHO  REFERRAL   2. Numbness of right foot R20.0 NEUROLOGY ADULT REFERRAL     gabapentin (NEURONTIN) 300 MG capsule     She does have point tenderness along the arch, though I do not know that that explains the decrease in her sensation or the entire leg  numbness she was having a couple weeks ago.  I suggested she start with a podiatry referral, hopefully will be able to be seen in the next week.  If they do not feel that the tingling can be explained by a musculoskeletal issue, I would like her to get an EMG done.  She can try over-the-counter capsaicin cream.  Also will try gabapentin 300 mg nightly.    Follow up as above.     Darline Childs MD  [facility]

## 2018-05-01 ENCOUNTER — HOSPITAL ENCOUNTER (EMERGENCY)
Facility: CLINIC | Age: 73
Discharge: HOME | End: 2018-05-01
Attending: EMERGENCY MEDICINE | Admitting: EMERGENCY MEDICINE
Payer: COMMERCIAL

## 2018-05-01 ENCOUNTER — OFFICE VISIT (OUTPATIENT)
Dept: FAMILY MEDICINE | Facility: CLINIC | Age: 73
End: 2018-05-01
Payer: COMMERCIAL

## 2018-05-01 ENCOUNTER — APPOINTMENT (OUTPATIENT)
Dept: ULTRASOUND IMAGING | Facility: CLINIC | Age: 73
End: 2018-05-01
Attending: EMERGENCY MEDICINE
Payer: COMMERCIAL

## 2018-05-01 ENCOUNTER — APPOINTMENT (OUTPATIENT)
Dept: CT IMAGING | Facility: CLINIC | Age: 73
End: 2018-05-01
Attending: EMERGENCY MEDICINE
Payer: COMMERCIAL

## 2018-05-01 VITALS
WEIGHT: 131 LBS | RESPIRATION RATE: 16 BRPM | TEMPERATURE: 98.4 F | DIASTOLIC BLOOD PRESSURE: 64 MMHG | OXYGEN SATURATION: 100 % | SYSTOLIC BLOOD PRESSURE: 146 MMHG | BODY MASS INDEX: 21.05 KG/M2 | HEIGHT: 66 IN

## 2018-05-01 VITALS
HEART RATE: 74 BPM | WEIGHT: 131 LBS | BODY MASS INDEX: 21.14 KG/M2 | DIASTOLIC BLOOD PRESSURE: 60 MMHG | SYSTOLIC BLOOD PRESSURE: 124 MMHG | TEMPERATURE: 98.5 F

## 2018-05-01 DIAGNOSIS — I73.9 PERIPHERAL ARTERY DISEASE (H): ICD-10-CM

## 2018-05-01 DIAGNOSIS — M79.671 RIGHT FOOT PAIN: ICD-10-CM

## 2018-05-01 DIAGNOSIS — R09.89 DECREASED PEDAL PULSES: Primary | ICD-10-CM

## 2018-05-01 LAB
ANION GAP SERPL CALCULATED.3IONS-SCNC: 9 MMOL/L (ref 3–14)
BASOPHILS # BLD AUTO: 0 10E9/L (ref 0–0.2)
BASOPHILS NFR BLD AUTO: 0.6 %
BUN SERPL-MCNC: 24 MG/DL (ref 7–30)
CALCIUM SERPL-MCNC: 8.9 MG/DL (ref 8.5–10.1)
CHLORIDE SERPL-SCNC: 98 MMOL/L (ref 94–109)
CO2 SERPL-SCNC: 25 MMOL/L (ref 20–32)
CREAT BLD-MCNC: 1.1 MG/DL (ref 0.52–1.04)
CREAT SERPL-MCNC: 1.04 MG/DL (ref 0.52–1.04)
DIFFERENTIAL METHOD BLD: ABNORMAL
EOSINOPHIL # BLD AUTO: 0.2 10E9/L (ref 0–0.7)
EOSINOPHIL NFR BLD AUTO: 3.1 %
ERYTHROCYTE [DISTWIDTH] IN BLOOD BY AUTOMATED COUNT: 13.9 % (ref 10–15)
GFR SERPL CREATININE-BSD FRML MDRD: 49 ML/MIN/1.7M2
GFR SERPL CREATININE-BSD FRML MDRD: 52 ML/MIN/1.7M2
GLUCOSE SERPL-MCNC: 105 MG/DL (ref 70–99)
HCT VFR BLD AUTO: 31.1 % (ref 35–47)
HGB BLD-MCNC: 10.2 G/DL (ref 11.7–15.7)
IMM GRANULOCYTES # BLD: 0 10E9/L (ref 0–0.4)
IMM GRANULOCYTES NFR BLD: 0.6 %
LYMPHOCYTES # BLD AUTO: 2.8 10E9/L (ref 0.8–5.3)
LYMPHOCYTES NFR BLD AUTO: 39.7 %
MCH RBC QN AUTO: 27.6 PG (ref 26.5–33)
MCHC RBC AUTO-ENTMCNC: 32.8 G/DL (ref 31.5–36.5)
MCV RBC AUTO: 84 FL (ref 78–100)
MONOCYTES # BLD AUTO: 0.4 10E9/L (ref 0–1.3)
MONOCYTES NFR BLD AUTO: 5.9 %
NEUTROPHILS # BLD AUTO: 3.6 10E9/L (ref 1.6–8.3)
NEUTROPHILS NFR BLD AUTO: 50.1 %
NRBC # BLD AUTO: 0 10*3/UL
NRBC BLD AUTO-RTO: 0 /100
PLATELET # BLD AUTO: 300 10E9/L (ref 150–450)
POTASSIUM SERPL-SCNC: 3.9 MMOL/L (ref 3.4–5.3)
RBC # BLD AUTO: 3.69 10E12/L (ref 3.8–5.2)
SODIUM SERPL-SCNC: 132 MMOL/L (ref 133–144)
WBC # BLD AUTO: 7.2 10E9/L (ref 4–11)

## 2018-05-01 PROCEDURE — 75635 CT ANGIO ABDOMINAL ARTERIES: CPT

## 2018-05-01 PROCEDURE — 99285 EMERGENCY DEPT VISIT HI MDM: CPT | Mod: 25

## 2018-05-01 PROCEDURE — 25000128 H RX IP 250 OP 636: Performed by: EMERGENCY MEDICINE

## 2018-05-01 PROCEDURE — 99214 OFFICE O/P EST MOD 30 MIN: CPT | Performed by: INTERNAL MEDICINE

## 2018-05-01 PROCEDURE — 85025 COMPLETE CBC W/AUTO DIFF WBC: CPT | Performed by: EMERGENCY MEDICINE

## 2018-05-01 PROCEDURE — 82565 ASSAY OF CREATININE: CPT

## 2018-05-01 PROCEDURE — 80048 BASIC METABOLIC PNL TOTAL CA: CPT | Performed by: EMERGENCY MEDICINE

## 2018-05-01 PROCEDURE — 93926 LOWER EXTREMITY STUDY: CPT | Mod: RT

## 2018-05-01 PROCEDURE — 25000125 ZZHC RX 250: Performed by: EMERGENCY MEDICINE

## 2018-05-01 PROCEDURE — 96360 HYDRATION IV INFUSION INIT: CPT | Mod: 59

## 2018-05-01 RX ORDER — IOPAMIDOL 755 MG/ML
100 INJECTION, SOLUTION INTRAVASCULAR ONCE
Status: COMPLETED | OUTPATIENT
Start: 2018-05-01 | End: 2018-05-01

## 2018-05-01 RX ADMIN — IOPAMIDOL 100 ML: 755 INJECTION, SOLUTION INTRAVENOUS at 19:25

## 2018-05-01 RX ADMIN — SODIUM CHLORIDE 70 ML: 9 INJECTION, SOLUTION INTRAVENOUS at 19:25

## 2018-05-01 RX ADMIN — SODIUM CHLORIDE 1000 ML: 9 INJECTION, SOLUTION INTRAVENOUS at 19:04

## 2018-05-01 ASSESSMENT — ENCOUNTER SYMPTOMS
NUMBNESS: 1
MYALGIAS: 1
ARTHRALGIAS: 1
WEAKNESS: 0
JOINT SWELLING: 1
FEVER: 0
COLOR CHANGE: 1
BACK PAIN: 0
WOUND: 0

## 2018-05-01 NOTE — ED PROVIDER NOTES
History     Chief Complaint:  Foot Pain    HPI   Trish Wu is a 73 year old female who presents to the emergency department today for evaluation of foot pain. The patient was seen here recently for similar symptoms approximately 2 weeks ago. She has followed up in her primary clinic multiple times prior to her visit int he ED today, including earlier today where she was examined and there was concern for a possible DVT. The patient was referred her by her clinic provider after the patient reported she has been experiencing paraesthesias and numbness for the past few days, along with swelling, redness, and pain to the arch of her foot. Clinic reported that she had decreased pulses on her right side as well. Patient on presentation states that her foot feels swollen and tight, but that this resolves when she exercises/walks. Patient does have a current abdominal aortic aneurysm that has not ruptured yet and is currently being monitored by vascular surgery as an outpatient. Of note, patient was in [place] last summer when she stepped on a cactus with this same foot, she had pieces of this removed but she is unsure if all of it is out. She denies any fevers, weakness, trauma, or any other symptoms.    Allergies:  Penicillins     Medications:    Calcium Carb-Cholecalciferol (CALCIUM 500 +D) 500-400 MG-UNIT TABS  gabapentin (NEURONTIN) 300 MG capsule  HYDROcodone-acetaminophen (NORCO) 5-325 MG per tablet  lisinopril-hydrochlorothiazide (PRINZIDE/ZESTORETIC) 20-25 MG per tablet    Past Medical History:    HTN  Osteopenia  Aortic Aneurism     Past Surgical History:    History reviewed. No pertinent surgical history.    Family History:    DMII  Osteoporosis     Social History:  Smoking Status: Former Smoker  Smokeless Tobacco: Never Used  Alcohol Use: Positive - rare   Marital Status:       Review of Systems   Constitutional: Negative for fever.   Cardiovascular: Positive for leg swelling. Negative for chest  "pain.   Musculoskeletal: Positive for arthralgias, joint swelling and myalgias. Negative for back pain and gait problem.   Skin: Positive for color change. Negative for wound.   Neurological: Positive for numbness. Negative for weakness.   All other systems reviewed and are negative.    Physical Exam     Patient Vitals for the past 24 hrs:   BP Temp Temp src Heart Rate Resp SpO2 Height Weight   05/01/18 1532 135/52 98.4  F (36.9  C) Oral 65 16 99 % 1.676 m (5' 6\") 59.4 kg (131 lb)         Physical Exam  General: Appears well-developed and well-nourished.   Head: No signs of trauma.   CV: Normal rate and regular rhythm.  Unable to clearly feel bilateral femoral, PT, or DP pulses.  Cap refill in toes overall appropriate.  Resp: Effort normal and breath sounds normal. No respiratory distress.   GI: Soft. There is no tenderness.  No rebound or guarding.  Normal bowel sounds.    MSK: Normal range of motion. no edema. No Calf tenderness.  Neuro: The patient is alert and oriented.  Strength in upper/lower extremities normal and symmetrical.  5/5 dorsal/plantar flexion of ankles/great toes bilaterally.  Sensation normal. Speech normal.  GCS 15  Skin: Skin is warm and dry. No rash noted.   Psych: normal mood and affect. behavior is normal.       Emergency Department Course   Imaging:  US Lower Extremity Venous Duplex right:   Monophasic flow through the leg. No definite flow in the proximal PTA. Flow distally may be in PTA or collateral.  Report per radiology.    CT Abd Aorta, bilateral Iliofemoral Angio:  Preliminary report given to Dr. Jerome.    Laboratory:  CBC:  WBC 7.2 HGB 10.2 (L), ,  BMP:  (L), Glucose 105 (H), GFR 52 (H), otherwise WNL (Creatinine 1.04)  (1904) Creatinine POCT: creatinine 1.1 (H), GFR 49 (L)     Interventions:  (2004) Normal Saline, 1 liter, IV bolus    Emergency Department Course:  Nursing notes and vitals reviewed.  (1551) I performed an exam of the patient as documented above.    The " patient was sent for a ultrasound ant CT scan while in the emergency department, findings above.   Blood was drawn from the patient. This was sent for laboratory testing, findings above.     (1840) I spoke with Dr. Jerome of vascular surgery at this time, and asked for a CT of the abdomen.    Findings and plan explained to the patient. Patient discharged home with instructions regarding supportive care, medications, and reasons to return. The importance of close follow-up was reviewed.   Impression & Plan    Medical Decision Making:  Trish Wu is a 73 year old female who presents to the emergency department today for evaluation of foot pain. The patient had been seen a few weeks ago for pain in the leg, then localized to the foot. She had followed up a couple of times with her doctor including today, who felt that her pulses had decreased, so she was sent to the ED. On my evaluation, I was unable to feel the patient's pulse in either foot along with the femoral pulses. I did do a ultrasound of the right leg, which showed monophasic wave forme. Given this, I did speak with Dr. Jerome of vascular surgery, he asked for a CT of the abdomen along with runoffs of bilateral legs.  This was obtained which showed what appears to be more chronic narrowing with collateral flow.  Dr. Jerome did evaluate the patient in the emergency department and recommended follow-up in his clinic in 1 week to discuss further interventions, but did not feel that acute or emergent interventions were indicated.  Patient was comfortable with this plan she is recommended to continue to exercise to help maintain flow and to return if she had simply worsening pain, numbness, weakness, or any other further concerns.    Diagnosis:    ICD-10-CM   1. Peripheral artery disease (H) I73.9       Disposition:   Patient is discharged to home.          Scribe Disclosure:  I, Abhi Asencio, am serving as a scribe on 5/1/2018 at 3:51 PM to personally  document services performed by Dr. Lee based on my observations and the provider's statements to me.           EMERGENCY DEPARTMENT       Reno Lee MD  05/05/18 0044

## 2018-05-01 NOTE — ED AVS SNAPSHOT
Emergency Department    [address] [facility] [address]    Phone:  [phone]    Fax:  [phone]                                       Trish Wu   MRN: [id number]    Department:   Emergency Department   Date of Visit:  5/1/2018           After Visit Summary Signature Page     I have received my discharge instructions, and my questions have been answered. I have discussed any challenges I see with this plan with the nurse or doctor.    ..........................................................................................................................................  Patient/Patient Representative Signature      ..........................................................................................................................................  Patient Representative Print Name and Relationship to Patient    ..................................................               ................................................  Date                                            Time    ..........................................................................................................................................  Reviewed by Signature/Title    ...................................................              ..............................................  Date                                                            Time

## 2018-05-01 NOTE — MR AVS SNAPSHOT
After Visit Summary   5/1/2018    Trish Wu    MRN: [id number]           Patient Information     Date Of Birth          1945        Visit Information        Provider Department      5/1/2018 1:20 PM Darline Childs MD [facility]        [facility] [address] [place] [address]            Follow-ups after your visit        Follow-up notes from your care team     Return for to ER.      Your next 10 appointments already scheduled     May 03, 2018 11:00 AM CDT   New Visit with Javier Bautista DPM   [facility]    [address] [facility] [facility] [address] [phone]              Who to contact     If you have questions or need follow up information about today's clinic visit or your schedule please contact [facility] directly at [phone].  Normal or non-critical lab and imaging results will be communicated to you by MyChart, letter or phone within 4 business days after the clinic has received the results. If you do not hear from us within 7 days, please contact the clinic through [employer] or phone. If you have a critical or abnormal lab result, we will notify you by phone as soon as possible.  Submit refill requests through [employer] or call your pharmacy and they will forward the refill request to us. Please allow 3 business days for your refill to be completed.          Additional Information About Your Visit        MyChart Information     [employer] gives you secure access to your electronic health record. If you see a primary care provider, you can also send messages to your care team and make appointments. If you have questions, please call your primary care clinic.  If you do not have a primary care provider, please call [phone] and they will assist you.        Care EveryWhere ID     This is your Care EveryWhere ID. This could be used by other organizations to  access your [facility] medical records  [id number]        Your Vitals Were     Pulse Temperature BMI (Body Mass Index)             74 98.5  F (36.9  C) (Tympanic) 21.14 kg/m2          Blood Pressure from Last 3 Encounters:   05/01/18 124/60   04/27/18 120/62   04/20/18 116/65    Weight from Last 3 Encounters:   05/01/18 131 lb (59.4 kg)   04/27/18 132 lb (59.9 kg)   04/20/18 130 lb (59 kg)              Today, you had the following     No orders found for display       Primary Care Provider Office Phone # Fax #    Jermain Arcos [phone]       [address]  [place] [address]        Equal Access to Services     JESSICA FLORENCE : Nora harpero Soevie, waaxda luqadaha, qaybta kaalmada adeegyada, lizeth brito . So [facility] [phone].    ATENCIÓN: Si habla español, tiene a valencia disposición servicios gratuitos de asistencia lingüística. Llame al [phone].    We comply with applicable federal civil rights laws and [place] laws. We do not discriminate on the basis of race, color, national origin, age, disability, sex, sexual orientation, or gender identity.            Thank you!     Thank you for choosing [facility] [place]  for your care. Our goal is always to provide you with excellent care. Hearing back from our patients is one way we can continue to improve our services. Please take a few minutes to complete the written survey that you may receive in the mail after your visit with us. Thank you!             Your Updated Medication List - Protect others around you: Learn how to safely use, store and throw away your medicines at www.disposemymeds.org.          This list is accurate as of 5/1/18  1:56 PM.  Always use your most recent med list.                   Brand Name Dispense Instructions for use Diagnosis    Calcium Carb-Cholecalciferol 500-400 MG-UNIT Tabs    calcium 500 +D     Take 1 tablet by mouth 2 times daily    Osteopenia,  unspecified location       gabapentin 300 MG capsule    NEURONTIN    30 capsule    Take 1 capsule (300 mg) by mouth At Bedtime    Numbness of right foot       HYDROcodone-acetaminophen 5-325 MG per tablet    NORCO    30 tablet    Take 1 tab QHS as needed for right leg pain    Paresthesia       lisinopril-hydrochlorothiazide 20-25 MG per tablet    PRINZIDE/ZESTORETIC    90 tablet    Take 1 tablet by mouth daily . Need appointment and lab for further refill    Essential hypertension with goal blood pressure less than 140/90

## 2018-05-01 NOTE — PROGRESS NOTES
SUBJECTIVE:   Trish Wu is a 73 year old female who presents to clinic today for the following health issues:      Trish here for follow-up of foot pain.  She was seen on 4/27, has had this issue for a couple weeks now.  Last week she was referred to podiatry with plans for neurology follow-up as well for burning pain in her toes pain in the arch.  Over the weekend, she has had worsening symptoms.  She wakes up in the morning and the foot from lower shin down is very red, it feels very tight and swollen but does not look swollen.  She gets up and walks around and the pain and redness improve.  The gabapentin she did start which is helped with the tingling sensation.  She denies any calf pain or swelling.  Wants to make sure it is okay to wait until her podiatry appt in two days.           Reviewed and updated as needed this visit by clinical staff  Tobacco  Allergies  Meds       Reviewed and updated as needed this visit by Provider         ROS:  Msk, neuro, skin reviewed,  otherwise negative unless noted above.      OBJECTIVE:     /60 (BP Location: Right arm, Patient Position: Chair, Cuff Size: Adult Regular)  Pulse 74  Temp 98.5  F (36.9  C) (Tympanic)  Wt 131 lb (59.4 kg)  BMI 21.14 kg/m2  Body mass index is 21.14 kg/(m^2).    Gen: pleasant, well appearing woman, no distress  Ext: right toes are red, very slightly swollen.  No other redness or edema.  Her pulses are difficult to palpate on both sides.  With the doppler, pulses are easy to find and crisp on the left, present but more difficult to find on the right and dramatically more slurred sounding than on the left.         ASSESSMENT/PLAN:         ICD-10-CM    1. Decreased pedal pulses R09.89    2. Right foot pain M79.671      Pulses were present at her visit last week, difficult to palpate today.  Doppler is quite asymmetric.  I am concerned for peripheral arterial disease.  I recommended she return to the ER for vascular eval.  She  agrees, will have her daughter-in-law bring her.  Aris FLEMING called.      F/U - to ED        Darline Childs MD  [facility]

## 2018-05-01 NOTE — NURSING NOTE
"Chief Complaint   Patient presents with     Musculoskeletal Problem       Initial /60 (BP Location: Right arm, Patient Position: Chair, Cuff Size: Adult Regular)  Pulse 74  Temp 98.5  F (36.9  C) (Tympanic)  Wt 131 lb (59.4 kg)  BMI 21.14 kg/m2 Estimated body mass index is 21.14 kg/(m^2) as calculated from the following:    Height as of 4/20/18: 5' 6\" (1.676 m).    Weight as of this encounter: 131 lb (59.4 kg).  Medication Reconciliation: complete  "

## 2018-05-01 NOTE — ED AVS SNAPSHOT
Emergency Department    [address] [facility] [address]    Phone:  [phone]    Fax:  [phone]                                       Trish Wu   MRN: [id number]    Department:   Emergency Department   Date of Visit:  5/1/2018           Patient Information     Date Of Birth          1945        Your diagnoses for this visit were:     Peripheral artery disease (H)        You were seen by Reno Lee MD.      Follow-up Information     Call Wing Jerome MD.    Specialty:  Vascular Surgery    Contact information:    [address]  [facility] [id number] [phone]          Follow up with  Emergency Department.    Specialty:  EMERGENCY MEDICINE    Why:  As needed, If symptoms worsen    Contact information:    [address] [facility] [address] [phone]        Discharge Instructions         Discharge Instructions for Peripheral Arterial Disease (PAD)  You have been diagnosed with peripheral arterial disease (PAD). Peripheral blood vessels deliver oxygen-rich blood to your legs and feet. Over time, your blood vessel walls may thicken as they build up with a fatty substance (plaque). As plaque builds up in an artery, blood flow can be reduced or even blocked. This causes PAD. This can lead to pain when you walk (claudication) and pain when you rest. It can even cause ulcers or tissue death due to lack of blood supply (gangrene).  Home care    Stay at a healthy weight. Get help to lose any extra pounds.    Eat more fresh fruits and vegetables    Limit canned, dried, packaged, and fast foods.    Limit your salt intake. Don t add more salt to your food at the table.    Season foods with herbs instead of salt when you cook.    Lower the amount of cholesterol, and saturated and trans fats in your diet.     Begin an exercise program. Ask your healthcare provider how to get started. You can benefit from simple activities such as walking or  gardening.    Break your smoking habit. Join a stop-smoking program for a better chance of success.    Take your medicines as directed. Don t skip doses.    If you have diabetes, manage your blood sugar as directed by your healthcare provider.  Follow-up  Talk to your healthcare provider about treatment options. These may include an exercise program, medicines, angioplasty, or surgery.  When to call your healthcare provider  Call your provider right away or seek immediate medical care if you have any of the following:    Pain in your legs or a feeling that your legs are weak or giving out    Constant tingling, numbness, weakness, or coldness in your feet    Change in the color of your toes    Open sores that won t heal on your toes, feet, or legs    Chest pain    Shortness of breath    Trouble speaking or understanding   Date Last Reviewed: 6/1/2016 2000-2017 The "[employer]. [address] [place] [address]. All rights reserved. This information is not intended as a substitute for professional medical care. Always follow your healthcare professional's instructions.          Your next 10 appointments already scheduled     May 03, 2018 11:00 AM CDT   [facility] Visit with Javier [facility]autista DPM   [facility]    [address] [place] [address] [phone]              24 Hour Appointment Hotline       To make an appointment at any [facility], call [phone] ([phone]). If you don't have a family doctor or clinic, we will help you find one. [facility] are conveniently located to serve the needs of you and your family.             Review of your medicines      Our records show that you are taking the medicines listed below. If these are incorrect, please call your family doctor or clinic.        Dose / Directions Last dose taken    Calcium Carb-Cholecalciferol 500-400 MG-UNIT Tabs   Commonly known as:  calcium 500 +D   Dose:  1  tablet        Take 1 tablet by mouth 2 times daily   Refills:  0        gabapentin 300 MG capsule   Commonly known as:  NEURONTIN   Dose:  300 mg   Quantity:  30 capsule        Take 1 capsule (300 mg) by mouth At Bedtime   Refills:  3        HYDROcodone-acetaminophen 5-325 MG per tablet   Commonly known as:  NORCO   Quantity:  30 tablet        Take 1 tab QHS as needed for right leg pain   Refills:  0        lisinopril-hydrochlorothiazide 20-25 MG per tablet   Commonly known as:  PRINZIDE/ZESTORETIC   Dose:  1 tablet   Quantity:  90 tablet        Take 1 tablet by mouth daily . Need appointment and lab for further refill   Refills:  3                Procedures and tests performed during your visit     Basic metabolic panel    CBC with platelets + differential    CT Abd Aorta Bilataeral Iliofem Angio    Creatinine POCT    US Lower Extremity Arterial rt      Orders Needing Specimen Collection     None      Pending Results     Date and Time Order Name Status Description    5/1/2018 1849 CT Abd Aorta Bilataeral Iliofem Angio In process     5/1/2018 1606 US Lower Extremity Arterial rt In process             Pending Culture Results     No orders found from 4/29/2018 to 5/2/2018.            Pending Results Instructions     If you had any lab results that were not finalized at the time of your Discharge, you can call the ED Lab Result RN at [phone]. You will be contacted by this team for any positive Lab results or changes in treatment. The nurses are available 7 days a week from 10A to 6:30P.  You can leave a message 24 hours per day and they will return your call.        Test Results From Your Hospital Stay        5/1/2018  4:48 PM      Result not yet available     Exam Ended         5/1/2018  7:13 PM      Component Results     Component Value Ref Range & Units Status    WBC 7.2 4.0 - 11.0 10e9/L Final    RBC Count 3.69 (L) 3.8 - 5.2 10e12/L Final    Hemoglobin 10.2 (L) 11.7 - 15.7 g/dL Final    Hematocrit 31.1 (L)  35.0 - 47.0 % Final    MCV 84 78 - 100 fl Final    MCH 27.6 26.5 - 33.0 pg Final    MCHC 32.8 31.5 - 36.5 g/dL Final    RDW 13.9 10.0 - 15.0 % Final    Platelet Count 300 150 - 450 10e9/L Final    Diff Method Automated Method  Final    % Neutrophils 50.1 % Final    % Lymphocytes 39.7 % Final    % Monocytes 5.9 % Final    % Eosinophils 3.1 % Final    % Basophils 0.6 % Final    % Immature Granulocytes 0.6 % Final    Nucleated RBCs 0 0 /100 Final    Absolute Neutrophil 3.6 1.6 - 8.3 10e9/L Final    Absolute Lymphocytes 2.8 0.8 - 5.3 10e9/L Final    Absolute Monocytes 0.4 0.0 - 1.3 10e9/L Final    Absolute Eosinophils 0.2 0.0 - 0.7 10e9/L Final    Absolute Basophils 0.0 0.0 - 0.2 10e9/L Final    Abs Immature Granulocytes 0.0 0 - 0.4 10e9/L Final    Absolute Nucleated RBC 0.0  Final         5/1/2018  7:31 PM      Component Results     Component Value Ref Range & Units Status    Sodium 132 (L) 133 - 144 mmol/L Final    Potassium 3.9 3.4 - 5.3 mmol/L Final    Chloride 98 94 - 109 mmol/L Final    Carbon Dioxide 25 20 - 32 mmol/L Final    Anion Gap 9 3 - 14 mmol/L Final    Glucose 105 (H) 70 - 99 mg/dL Final    Urea Nitrogen 24 7 - 30 mg/dL Final    Creatinine 1.04 0.52 - 1.04 mg/dL Final    GFR Estimate 52 (L) >60 mL/min/1.7m2 Final    Non  GFR Calc    GFR Estimate If Black 63 >60 mL/min/1.7m2 Final    African American GFR Calc    Calcium 8.9 8.5 - 10.1 mg/dL Final         5/1/2018  7:19 PM      Result not yet available     Exam Ended         5/1/2018  7:07 PM      Component Results     Component Value Ref Range & Units Status    Creatinine 1.1 (H) 0.52 - 1.04 mg/dL Final    GFR Estimate 49 (L) >60 mL/min/1.7m2 Final    GFR Estimate If Black 59 (L) >60 mL/min/1.7m2 Final                Clinical Quality Measure: Blood Pressure Screening     Your blood pressure was checked while you were in the emergency department today. The last reading we obtained was  BP: 135/52 . Please read the guidelines below about  what these numbers mean and what you should do about them.  If your systolic blood pressure (the top number) is less than 120 and your diastolic blood pressure (the bottom number) is less than 80, then your blood pressure is normal. There is nothing more that you need to do about it.  If your systolic blood pressure (the top number) is 120-139 or your diastolic blood pressure (the bottom number) is 80-89, your blood pressure may be higher than it should be. You should have your blood pressure rechecked within a year by a primary care provider.  If your systolic blood pressure (the top number) is 140 or greater or your diastolic blood pressure (the bottom number) is 90 or greater, you may have high blood pressure. High blood pressure is treatable, but if left untreated over time it can put you at risk for heart attack, stroke, or kidney failure. You should have your blood pressure rechecked by a primary care provider within the next 4 weeks.  If your provider in the emergency department today gave you specific instructions to follow-up with your doctor or provider even sooner than that, you should follow that instruction and not wait for up to 4 weeks for your follow-up visit.        Thank you for choosing [facility]       Thank you for choosing [facility] for your care. Our goal is always to provide you with excellent care. Hearing back from our patients is one way we can continue to improve our services. Please take a few minutes to complete the written survey that you may receive in the mail after you visit with us. Thank you!        [email or web address] Information     [email or web address] gives you secure access to your electronic health record. If you see a primary care provider, you can also send messages to your care team and make appointments. If you have questions, please call your primary care clinic.  If you do not have a primary care provider, please call [phone] and they will assist you.        Care EveryWhere ID     This is  your Care EveryWhere ID. This could be used by other organizations to access your [facility] medical records  [id number]        Equal Access to Services     JESSICA FLORENCE : Hadii ludmila Villatoro, hilaria roberson, aileen koehler, lizeth huber. So [facility] [phone].    ATENCIÓN: Si habla español, tiene a valencia disposición servicios gratuitos de asistencia lingüística. Llame al [phone].    We comply with applicable federal civil rights laws and [place] laws. We do not discriminate on the basis of race, color, national origin, age, disability, sex, sexual orientation, or gender identity.            After Visit Summary       This is your record. Keep this with you and show to your community pharmacist(s) and doctor(s) at your next visit.

## 2018-05-02 NOTE — DISCHARGE INSTRUCTIONS
Discharge Instructions for Peripheral Arterial Disease (PAD)  You have been diagnosed with peripheral arterial disease (PAD). Peripheral blood vessels deliver oxygen-rich blood to your legs and feet. Over time, your blood vessel walls may thicken as they build up with a fatty substance (plaque). As plaque builds up in an artery, blood flow can be reduced or even blocked. This causes PAD. This can lead to pain when you walk (claudication) and pain when you rest. It can even cause ulcers or tissue death due to lack of blood supply (gangrene).  Home care    Stay at a healthy weight. Get help to lose any extra pounds.    Eat more fresh fruits and vegetables    Limit canned, dried, packaged, and fast foods.    Limit your salt intake. Don t add more salt to your food at the table.    Season foods with herbs instead of salt when you cook.    Lower the amount of cholesterol, and saturated and trans fats in your diet.     Begin an exercise program. Ask your healthcare provider how to get started. You can benefit from simple activities such as walking or gardening.    Break your smoking habit. Join a stop-smoking program for a better chance of success.    Take your medicines as directed. Don t skip doses.    If you have diabetes, manage your blood sugar as directed by your healthcare provider.  Follow-up  Talk to your healthcare provider about treatment options. These may include an exercise program, medicines, angioplasty, or surgery.  When to call your healthcare provider  Call your provider right away or seek immediate medical care if you have any of the following:    Pain in your legs or a feeling that your legs are weak or giving out    Constant tingling, numbness, weakness, or coldness in your feet    Change in the color of your toes    Open sores that won t heal on your toes, feet, or legs    Chest pain    Shortness of breath    Trouble speaking or understanding   Date Last Reviewed: 6/1/2016 2000-2017 The  [employer]. [address] [place] [address], [place] [address]. All rights reserved. This information is not intended as a substitute for professional medical care. Always follow your healthcare professional's instructions.

## 2018-05-03 NOTE — TELEPHONE ENCOUNTER
Pt recently saw Dr. Jerome in the ED on 5/1/18.  Pt had CTA done and was requesting an appointment with Dr. Jerome this week.  Pt is scheduled for OV on 5/4/18 with Dr. Jerome.  Sofia Kim, BSN, RN

## 2018-05-04 ENCOUNTER — OFFICE VISIT (OUTPATIENT)
Dept: OTHER | Facility: CLINIC | Age: 73
End: 2018-05-04
Attending: SURGERY
Payer: COMMERCIAL

## 2018-05-04 VITALS
DIASTOLIC BLOOD PRESSURE: 72 MMHG | HEART RATE: 76 BPM | SYSTOLIC BLOOD PRESSURE: 122 MMHG | HEIGHT: 66 IN | WEIGHT: 132 LBS | BODY MASS INDEX: 21.21 KG/M2

## 2018-05-04 DIAGNOSIS — I71.40 ABDOMINAL AORTIC ANEURYSM (AAA) WITHOUT RUPTURE (H): Primary | ICD-10-CM

## 2018-05-04 PROCEDURE — G0463 HOSPITAL OUTPT CLINIC VISIT: HCPCS

## 2018-05-04 PROCEDURE — 99213 OFFICE O/P EST LOW 20 MIN: CPT | Mod: ZP | Performed by: SURGERY

## 2018-05-04 NOTE — LETTER
[facility] [address]  [place] [address]  Phone: [phone]  Fax: [phone]    May 4, 2018    Re: Trish Wu, : 1945    Mrs. Wu is a very pleasant 73-year-old female was recently seen in the emergency department.  Her primary care physician was unable to feel her pedal pulses, and patient was referred to the emergency department.  Patient complains of occasional coldness and numbness in the right foot.  She does not have any claudication.  She does not have any open wounds or ulcers.  She thinks she can get by with activity of daily living without any problems.  The patient is known to us and was as she has been seen previously by Dr. Mcdermott in regards to his small infrarenal abdominal aortic aneurysm.     At the time of my evaluation in the emergency department did not have palpable femoral pulses.  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  Her distal aorta was occluded and reconstituted small iliac vessels with significant infrainguinal disease as well.     Her aneurysm is not amenable to endovascular therapy.  Because of its proximity to the renal arteries.     Her symptoms of occlusive disease.  I manageable and I do not believe she has critical limb ischemia.  The aneurysm is still small and given the fact that it is juxtarenal and would require open repair.  I do not think at this current size open repair is warranted.  Certainly risks of surgery do not outweigh the potential benefits.     I explained the rationale of nonoperative treatment to her in detail.  She has verbalized an understanding.  We will see her back in 1 year with aortic ultrasound.    Wing Jerome MD

## 2018-05-04 NOTE — MR AVS SNAPSHOT
"              After Visit Summary   5/4/2018    Trish Wu    MRN: [id number]           Patient Information     Date Of Birth          1945        Visit Information        Provider Department      5/4/2018 8:15 AM Wing Jerome MD [facility]      Today's Diagnoses     Abdominal aortic aneurysm (AAA) without rupture (H)    -  1       Follow-ups after your visit        Who to contact     If you have questions or need follow up information about today's clinic visit or your schedule please contact [facility] directly at [phone].  Normal or non-critical lab and imaging results will be communicated to you by [employer], letter or phone within 4 business days after the clinic has received the results. If you do not hear from us within 7 days, please contact the clinic through [employer] or phone. If you have a critical or abnormal lab result, we will notify you by phone as soon as possible.  Submit refill requests through [employer] or call your pharmacy and they will forward the refill request to us. Please allow 3 business days for your refill to be completed.          Additional Information About Your Visit        MyChart Information     [employer] gives you secure access to your electronic health record. If you see a primary care provider, you can also send messages to your care team and make appointments. If you have questions, please call your primary care clinic.  If you do not have a primary care provider, please call [phone] and they will assist you.        Care EveryWhere ID     This is your Care EveryWhere ID. This could be used by other organizations to access your [facility] medical records  [id number]        Your Vitals Were     Pulse Height Breastfeeding? BMI (Body Mass Index)          76 5' 6\" (1.676 m) No 21.31 kg/m2         Blood Pressure from Last 3 Encounters:   05/04/18 122/72   05/01/18 146/64 "   05/01/18 124/60    Weight from Last 3 Encounters:   05/04/18 132 lb (59.9 kg)   05/01/18 131 lb (59.4 kg)   05/01/18 131 lb (59.4 kg)              Today, you had the following     No orders found for display       Primary Care Provider Office Phone # Fax #    Jermain Arcos [phone]       [address]  [place] [address]        Equal Access to Services     [facility] : Hadii aad ku hadasho Soomaali, waaxda luqadaha, qaybta kaalmada adeegyada, waxay idiin hayaan adeeg kharash lacyndy . So [facility] [phone].    ATENCIÓN: Si habla español, tiene a valencia disposición servicios gratuitos de asistencia lingüística. Ll[facility] [phone].    We comply with applicable federal civil rights laws and [place] laws. We do not discriminate on the basis of race, color, national origin, age, disability, sex, sexual orientation, or gender identity.            Thank you!     Thank you for choosing [facility] [place]  for your care. Our goal is always to provide you with excellent care. Hearing back from our patients is one way we can continue to improve our services. Please take a few minutes to complete the written survey that you may receive in the mail after your visit with us. Thank you!             Your Updated Medication List - Protect others around you: Learn how to safely use, store and throw away your medicines at www.disposemymeds.org.          This list is accurate as of 5/4/18 11:18 AM.  Always use your most recent med list.                   Brand Name Dispense Instructions for use Diagnosis    Calcium Carb-Cholecalciferol 500-400 MG-UNIT Tabs    calcium 500 +D     Take 1 tablet by mouth 2 times daily    Osteopenia, unspecified location       gabapentin 300 MG capsule    NEURONTIN    30 capsule    Take 1 capsule (300 mg) by mouth At Bedtime    Numbness of right foot       HYDROcodone-acetaminophen 5-325 MG per tablet    NORCO    30 tablet    Take 1 tab QHS as needed for  right leg pain    Paresthesia       lisinopril-hydrochlorothiazide 20-25 MG per tablet    PRINZIDE/ZESTORETIC    90 tablet    Take 1 tablet by mouth daily . Need appointment and lab for further refill    Essential hypertension with goal blood pressure less than 140/90

## 2018-05-04 NOTE — PROGRESS NOTES
Mrs. Wu is a very pleasant 73-year-old female was recently seen in the emergency department.  Her primary care physician was unable to feel her pedal pulses, and patient was referred to the emergency department.  Patient complains of occasional coldness and numbness in the right foot.  She does not have any claudication.  She does not have any open wounds or ulcers.  She thinks she can get by with activity of daily living without any problems.  The patient is known to us and was as she has been seen previously by Dr. Mcdermott in regards to his small infrarenal abdominal aortic aneurysm.    At the time of my evaluation in the emergency department did not have palpable femoral pulses.  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  Her distal aorta was occluded and reconstituted small iliac vessels with significant infrainguinal disease as well.    Her aneurysm is not amenable to endovascular therapy.  Because of its proximity to the renal arteries.    Her symptoms of occlusive disease.  I manageable and I do not believe she has critical limb ischemia.  The aneurysm is still small and given the fact that it is juxtarenal and would require open repair.  I do not think at this current size open repair is warranted.  Certainly risks of surgery do not outweigh the potential benefits.    I explained the rationale of nonoperative treatment to her in detail.  She has verbalized an understanding.  We will see her back in 1 year with aortic ultrasound.

## 2018-05-07 NOTE — NURSING NOTE
"Trish Wu is a 73 year old female who presents for:  Chief Complaint   Patient presents with     Consult     Follow up from ED - foot pain        Initial Vitals:  /72 (BP Location: Right arm, Patient Position: Chair, Cuff Size: Adult Regular)  Pulse 76  Ht 5' 6\" (1.676 m)  Wt 132 lb (59.9 kg)  Breastfeeding? No  BMI 21.31 kg/m2 Estimated body mass index is 21.31 kg/(m^2) as calculated from the following:    Height as of this encounter: 5' 6\" (1.676 m).    Weight as of this encounter: 132 lb (59.9 kg).      Riya Zapien CMA       "

## 2018-05-11 ENCOUNTER — OFFICE VISIT (OUTPATIENT)
Dept: FAMILY MEDICINE | Facility: CLINIC | Age: 73
End: 2018-05-11
Payer: COMMERCIAL

## 2018-05-11 VITALS
DIASTOLIC BLOOD PRESSURE: 68 MMHG | BODY MASS INDEX: 21.95 KG/M2 | TEMPERATURE: 96 F | HEART RATE: 62 BPM | WEIGHT: 136 LBS | SYSTOLIC BLOOD PRESSURE: 122 MMHG

## 2018-05-11 DIAGNOSIS — I73.9 PAD (PERIPHERAL ARTERY DISEASE) (H): Primary | ICD-10-CM

## 2018-05-11 DIAGNOSIS — R20.0 NUMBNESS OF RIGHT FOOT: ICD-10-CM

## 2018-05-11 DIAGNOSIS — I70.211 ATHEROSCLEROSIS OF NATIVE ARTERY OF RIGHT LOWER EXTREMITY WITH INTERMITTENT CLAUDICATION (H): ICD-10-CM

## 2018-05-11 DIAGNOSIS — R20.2 PARESTHESIA: ICD-10-CM

## 2018-05-11 PROCEDURE — 99214 OFFICE O/P EST MOD 30 MIN: CPT | Performed by: INTERNAL MEDICINE

## 2018-05-11 RX ORDER — ATORVASTATIN CALCIUM 20 MG/1
20 TABLET, FILM COATED ORAL DAILY
Qty: 90 TABLET | Refills: 3 | Status: SHIPPED | OUTPATIENT
Start: 2018-05-11 | End: 2018-09-14

## 2018-05-11 RX ORDER — HYDROCODONE BITARTRATE AND ACETAMINOPHEN 5; 325 MG/1; MG/1
TABLET ORAL
Qty: 30 TABLET | Refills: 0 | Status: SHIPPED | OUTPATIENT
Start: 2018-05-11 | End: 2018-05-11

## 2018-05-11 RX ORDER — HYDROCODONE BITARTRATE AND ACETAMINOPHEN 5; 325 MG/1; MG/1
1 TABLET ORAL 2 TIMES DAILY PRN
Qty: 30 TABLET | Refills: 0 | Status: SHIPPED | OUTPATIENT
Start: 2018-05-11 | End: 2018-05-29

## 2018-05-11 RX ORDER — GABAPENTIN 300 MG/1
300 CAPSULE ORAL 3 TIMES DAILY
Qty: 90 CAPSULE | Refills: 3 | Status: SHIPPED | OUTPATIENT
Start: 2018-05-11

## 2018-05-11 NOTE — PROGRESS NOTES
SUBJECTIVE:   Trish Wu is a 73 year old female who presents to clinic today for the following health issues:      ED/UC Followup:    Facility:  [facility]  Date of visit: 5/1/18  Reason for visit: Peripheral artery disease   Current Status: Is still in a lot of pain, hardly slept last night, but is out of pain meds.      I sent Trish to the ED on 5/1 for decreased pulse in the right foot and associated pain and redness.  She had a CT which showed diffuse PAD.  She did have an appointment with Dr. Jerome in Vascular.  No surgical interventions were recommended.  She is taking a baby aspirin and he recommended she start walking regularly. She has been working on getting to her mailbox and a little farther every day.      She is still in quite a bit of pain.  Walking does help a little.  If she walks long enough she does get some pain in the calf.  She has been taking 1 to 1.5 tabs of norco daily.  She is tolerating gabapentin 300mg QHS, helps the tingling.  She took a dose in the middle of the day also and didn't notice any drowsiness.        CTA ANGIOGRAM ABDOMINAL AORTA, BILATERAL ILIOFEMORAL RUNOFF   5/1/2018  IMPRESSION:  1. Abdominal aortic aneurysm measures up to 4.3 cm. Essentially no aneurysm neck. There is moderate mural thrombus.  2. 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 Severely diseased bilateral external and internal iliac arteries.  3. Severely diseased right superficial femoral artery and occlusion of the proximal posterior tibial artery on the right.  4. Mildly diseased left superficial femoral artery and patent trifurcation vessels.  5. Emphysema.         Reviewed and updated as needed this visit by clinical staff  Tobacco  Allergies  Meds       Reviewed and updated as needed this visit by Provider         ROS:  Msk, heme reviewed,  otherwise negative unless noted above.       OBJECTIVE:     /68 (BP Location: Right arm, Patient Position: Chair, Cuff Size: Adult  Regular)  Pulse 62  Temp 96  F (35.6  C) (Tympanic)  Wt 136 lb (61.7 kg)  BMI 21.95 kg/m2  Body mass index is 21.95 kg/(m^2).     GENERAL: healthy, alert and no distress      ASSESSMENT/PLAN:         1. PAD (peripheral artery disease) (H)  She has pretty significant PAD.  Does follow with vascular.  On ACEI, aspirin, will start statin.  Looking into whether there are any supervised exercise programs for her to do.    - atorvastatin (LIPITOR) 20 MG tablet; Take 1 tablet (20 mg) by mouth daily  Dispense: 90 tablet; Refill: 3    2. Paresthesia  Hopefully with improved medical management and exercise her pain will improve, but for now using norco prn.  Increase gabapentin to 300mg TID.  As below.   - HYDROcodone-acetaminophen (NORCO) 5-325 MG per tablet; Take 1 tablet by mouth 2 times daily as needed for severe pain  Dispense: 30 tablet; Refill: 0    3. Numbness of right foot  - gabapentin (NEURONTIN) 300 MG capsule; Take 1 capsule (300 mg) by mouth 3 times daily  Dispense: 90 capsule; Refill: 3      Follow up 1 month.  Consider cilostazol    Darline Childs MD  [facility]

## 2018-05-11 NOTE — MR AVS SNAPSHOT
After Visit Summary   5/11/2018    Trish Wu    MRN: [id number]           Patient Information     Date Of Birth          1945        Visit Information        Provider Department      5/11/2018 11:40 AM Darline Childs MD [facility] [place]        Today's Diagnoses     PAD (peripheral artery disease) (H)    -  1    Paresthesia        Essential hypertension with goal blood pressure less than 140/90        Numbness of right foot           Follow-ups after your visit        Follow-up notes from your care team     Return in about 1 month (around 6/11/2018) for foot pain .      Who to contact     If you have questions or need follow up information about today's clinic visit or your schedule please contact [facility] [place] directly at [phone].  Normal or non-critical lab and imaging results will be communicated to you by MyChart, letter or phone within 4 business days after the clinic has received the results. If you do not hear from us within 7 days, please contact the clinic through MyChart or phone. If you have a critical or abnormal lab result, we will notify you by phone as soon as possible.  Submit refill requests through [employer] or call your pharmacy and they will forward the refill request to us. Please allow 3 business days for your refill to be completed.          Additional Information About Your Visit        MyChart Information     [employer] gives you secure access to your electronic health record. If you see a primary care provider, you can also send messages to your care team and make appointments. If you have questions, please call your primary care clinic.  If you do not have a primary care provider, please call [phone] and they will assist you.        Care EveryWhere ID     This is your Care EveryWhere ID. This could be used by other organizations to access your [facility] medical records  [id number]        Your Vitals Were     Pulse Temperature  BMI (Body Mass Index)             62 96  F (35.6  C) (Tympanic) 21.95 kg/m2          Blood Pressure from Last 3 Encounters:   05/11/18 122/68   05/04/18 122/72   05/01/18 146/64    Weight from Last 3 Encounters:   05/11/18 136 lb (61.7 kg)   05/04/18 132 lb (59.9 kg)   05/01/18 131 lb (59.4 kg)              Today, you had the following     No orders found for display         Today's Medication Changes          These changes are accurate as of 5/11/18 11:55 AM.  If you have any questions, ask your nurse or doctor.               Start taking these medicines.        Dose/Directions    atorvastatin 20 MG tablet   Commonly known as:  LIPITOR   Used for:  PAD (peripheral artery disease) (H)   Started by:  Darline Childs MD        Dose:  20 mg   Take 1 tablet (20 mg) by mouth daily   Quantity:  90 tablet   Refills:  3         These medicines have changed or have updated prescriptions.        Dose/Directions    gabapentin 300 MG capsule   Commonly known as:  NEURONTIN   This may have changed:  when to take this   Used for:  Numbness of right foot   Changed by:  Darline Childs MD        Dose:  300 mg   Take 1 capsule (300 mg) by mouth 3 times daily   Quantity:  90 capsule   Refills:  3            Where to get your medicines      These medications were sent to [facility] [place] - [address] [facility]  [address] [address]     Phone:  [phone]     atorvastatin 20 MG tablet    gabapentin 300 MG capsule         Some of these will need a paper prescription and others can be bought over the counter.  Ask your nurse if you have questions.     Bring a paper prescription for each of these medications     HYDROcodone-acetaminophen 5-325 MG per tablet               Information about OPIOIDS     PRESCRIPTION OPIOIDS: WHAT YOU NEED TO KNOW   You have a prescription for an opioid (narcotic) pain medicine. Opioids can cause addiction. If you have a history of  chemical dependency of any type, you are at a higher risk of becoming addicted to opioids. Only take this medicine after all other options have been tried. Take it for as short a time and as few doses as possible.     Do not:    Drive. If you drive while taking these medicines, you could be arrested for driving under the influence (DUI).    Operate heavy machinery    Do any other dangerous activities while taking these medicines.     Drink any alcohol while taking these medicines.      Take with any other medicines that contain acetaminophen. Read all labels carefully. Look for the word  acetaminophen  or  Tylenol.  Ask your pharmacist if you have questions or are unsure.    Store your pills in a secure place, locked if possible. We will not replace any lost or stolen medicine. If you don t finish your medicine, please throw away (dispose) as directed by your pharmacist. The [place] Pollution Control Agency has more information about safe disposal: https://www.pca.[facility].[place].us/living-green/managing-unwanted-medications    All opioids tend to cause constipation. Drink plenty of water and eat foods that have a lot of fiber, such as fruits, vegetables, prune juice, apple juice and high-fiber cereal. Take a laxative (Miralax, milk of magnesia, Colace, Senna) if you don t move your bowels at least every other day.          Primary Care Provider Office Phone # Fax #    Jermain Arcos [phone]       [address]  [place] [address]        Equal Access to Services     MARK FLORENCE [facility]: Hadii ludmila harpero Soevie, waaxda luqadaha, qaybta kaalmada adeshaniceyada, lizeth huber. So [facility] [phone].    ATENCIÓN: Si habla español, tiene a valencia disposición servicios gratuitos de asistencia lingüística. Llame al [phone].    We comply with applicable federal civil rights laws and [place] laws. We do not discriminate on the basis of race, color, national origin, age,  disability, sex, sexual orientation, or gender identity.            Thank you!     Thank you for choosing [facility] [place]  for your care. Our goal is always to provide you with excellent care. Hearing back from our patients is one way we can continue to improve our services. Please take a few minutes to complete the written survey that you may receive in the mail after your visit with us. Thank you!             Your Updated Medication List - Protect others around you: Learn how to safely use, store and throw away your medicines at www.disposemymeds.org.          This list is accurate as of 5/11/18 11:55 AM.  Always use your most recent med list.                   Brand Name Dispense Instructions for use Diagnosis    ASPIRIN EC PO      Take 81 mg by mouth daily        atorvastatin 20 MG tablet    LIPITOR    90 tablet    Take 1 tablet (20 mg) by mouth daily    PAD (peripheral artery disease) (H)       Calcium Carb-Cholecalciferol 500-400 MG-UNIT Tabs    calcium 500 +D     Take 1 tablet by mouth 2 times daily    Osteopenia, unspecified location       gabapentin 300 MG capsule    NEURONTIN    90 capsule    Take 1 capsule (300 mg) by mouth 3 times daily    Numbness of right foot       HYDROcodone-acetaminophen 5-325 MG per tablet    NORCO    30 tablet    Take 1 tab QHS as needed for right leg pain    Paresthesia       lisinopril-hydrochlorothiazide 20-25 MG per tablet    PRINZIDE/ZESTORETIC    90 tablet    Take 1 tablet by mouth daily . Need appointment and lab for further refill    Essential hypertension with goal blood pressure less than 140/90

## 2018-05-29 DIAGNOSIS — R20.2 PARESTHESIA: ICD-10-CM

## 2018-05-29 RX ORDER — HYDROCODONE BITARTRATE AND ACETAMINOPHEN 5; 325 MG/1; MG/1
1 TABLET ORAL 2 TIMES DAILY PRN
Qty: 30 TABLET | Refills: 0 | Status: SHIPPED | OUTPATIENT
Start: 2018-05-29 | End: 2018-06-11

## 2018-05-29 NOTE — TELEPHONE ENCOUNTER
Please deliver rx to [phone] ok to leave message  Controlled Substance Refill Request for Hydrocodone  Problem List Complete:  No     PROVIDER TO CONSIDER COMPLETION OF PROBLEM LIST AND OVERVIEW/CONTROLLED SUBSTANCE AGREEMENT    Last Written Prescription Date:  5/11/2018  Last Fill Quantity: 30,   # refills: 0    [place] [facility] [place]: 5/11/2018    Future Office visit:   Next 5 appointments (look out 90 days)     May 30, 2018 10:40 AM CDT   Office Visit with Darline Childs MD   [facility]    [address] [place] [id number]   [phone]                  Controlled substance agreement on file: No.     Processing:  Staff will hand deliver Rx to on-site pharmacy   checked in past 6 months?

## 2018-05-30 ENCOUNTER — OFFICE VISIT (OUTPATIENT)
Dept: FAMILY MEDICINE | Facility: CLINIC | Age: 73
End: 2018-05-30
Payer: COMMERCIAL

## 2018-05-30 VITALS
DIASTOLIC BLOOD PRESSURE: 60 MMHG | TEMPERATURE: 96.6 F | HEART RATE: 64 BPM | WEIGHT: 136 LBS | SYSTOLIC BLOOD PRESSURE: 110 MMHG | OXYGEN SATURATION: 99 % | BODY MASS INDEX: 21.95 KG/M2

## 2018-05-30 DIAGNOSIS — I73.9 PAD (PERIPHERAL ARTERY DISEASE) (H): Primary | ICD-10-CM

## 2018-05-30 PROCEDURE — 99213 OFFICE O/P EST LOW 20 MIN: CPT | Performed by: INTERNAL MEDICINE

## 2018-05-30 NOTE — MR AVS SNAPSHOT
After Visit Summary   5/30/2018    Trish Wu    MRN: [id number]           Patient Information     Date Of Birth          1945        Visit Information        Provider Department      5/30/2018 10:40 AM Darline Childs MD [facility] [place]        Care Instructions    Vascular rehab - please call [phone] for all locations.          Follow-ups after your visit        Follow-up notes from your care team     Return in about 3 months (around 8/30/2018) for PAD.      Who to contact     If you have questions or need follow up information about today's clinic visit or your schedule please contact [facility] [place] directly at [phone].  Normal or non-critical lab and imaging results will be communicated to you by MyChart, letter or phone within 4 business days after the clinic has received the results. If you do not hear from us within 7 days, please contact the clinic through [employer] or phone. If you have a critical or abnormal lab result, we will notify you by phone as soon as possible.  Submit refill requests through [employer] or call your pharmacy and they will forward the refill request to us. Please allow 3 business days for your refill to be completed.          Additional Information About Your Visit        MyChart Information     [employer] gives you secure access to your electronic health record. If you see a primary care provider, you can also send messages to your care team and make appointments. If you have questions, please call your primary care clinic.  If you do not have a primary care provider, please call [phone] and they will assist you.        Care EveryWhere ID     This is your Care EveryWhere ID. This could be used by other organizations to access your [facility] medical records  [id number]        Your Vitals Were     Pulse Temperature Pulse Oximetry BMI (Body Mass Index)          64 96.6  F (35.9  C) (Tympanic) 99% 21.95 kg/m2         Blood  Pressure from Last 3 Encounters:   05/30/18 110/60   05/11/18 122/68   05/04/18 122/72    Weight from Last 3 Encounters:   05/30/18 136 lb (61.7 kg)   05/11/18 136 lb (61.7 kg)   05/04/18 132 lb (59.9 kg)              Today, you had the following     No orders found for display       Primary Care Provider Office Phone # Fax #    Jermain Arcos [phone]       [address]  [place] [address]        Equal Access to Services     [facility]: Hadii aad ku hadasho Soomaali, waaxda luqadaha, qaybta kaalmada zakia, lizeth brito . So [facility] [phone].    ATENCIÓN: Si habla español, tiene a valencia disposición servicios gratuitos de asistencia lingüística. [facility] [phone].    We comply with applicable federal civil rights laws and [place] laws. We do not discriminate on the basis of race, color, national origin, age, disability, sex, sexual orientation, or gender identity.            Thank you!     Thank you for choosing [facility] [place]  for your care. Our goal is always to provide you with excellent care. Hearing back from our patients is one way we can continue to improve our services. Please take a few minutes to complete the written survey that you may receive in the mail after your visit with us. Thank you!             Your Updated Medication List - Protect others around you: Learn how to safely use, store and throw away your medicines at www.disposemymeds.org.          This list is accurate as of 5/30/18 10:51 AM.  Always use your most recent med list.                   Brand Name Dispense Instructions for use Diagnosis    ASPIRIN EC PO      Take 81 mg by mouth daily        atorvastatin 20 MG tablet    LIPITOR    90 tablet    Take 1 tablet (20 mg) by mouth daily    PAD (peripheral artery disease) (H)       Calcium Carb-Cholecalciferol 500-400 MG-UNIT Tabs    calcium 500 +D     Take 1 tablet by mouth 2 times daily    Osteopenia,  unspecified location       gabapentin 300 MG capsule    NEURONTIN    90 capsule    Take 1 capsule (300 mg) by mouth 3 times daily    Numbness of right foot       HYDROcodone-acetaminophen 5-325 MG per tablet    NORCO    30 tablet    Take 1 tablet by mouth 2 times daily as needed for severe pain    Paresthesia       lisinopril-hydrochlorothiazide 20-25 MG per tablet    PRINZIDE/ZESTORETIC    90 tablet    Take 1 tablet by mouth daily . Need appointment and lab for further refill    Essential hypertension with goal blood pressure less than 140/90

## 2018-05-30 NOTE — PROGRESS NOTES
SUBJECTIVE:   Trish Wu is a 73 year old female who presents to clinic today for the following health issues:    Trish is here to follow-up on right foot pain and PAD.  She is tolerating the atorvastatin we added earlier this month without side effects.  She is also doing well with gabapentin 300mg TID daily.  She is needing to take the hydrocodone 1 tab in the evening and usually another half a tablet in the middle the night.  The pain in her foot improves with ambulation, she is making sure to walk to the mailbox 4 times a day.  But at night when she is not moving the foot pain gets really bad.  Often has to wake up and walk around her room for a few moments.  The pain is very bad when she steps down in the ball and arch of her foot.  I had placed a referral to PAD rehab, but she does not recall getting any phone calls to get this scheduled.        Reviewed and updated as needed this visit by clinical staff  Tobacco  Allergies  Meds       Reviewed and updated as needed this visit by Provider           ROS:  Msk, CV reviewed,  otherwise negative unless noted above.       OBJECTIVE:     /60 (BP Location: Right arm, Patient Position: Chair, Cuff Size: Adult Regular)  Pulse 64  Temp 96.6  F (35.9  C) (Tympanic)  Wt 136 lb (61.7 kg)  SpO2 99%  BMI 21.95 kg/m2  Body mass index is 21.95 kg/(m^2).    Gen: pleasant, well appearing woman, no distress  Ext: right foot with reddish toes, no edema. Difficult to palpate DP pulse, which is stable.  Toes are warm.         ASSESSMENT/PLAN:       1. PAD (peripheral artery disease) (H)  Hydrocodone was refilled yesterday, patient handed rx at the visit.  Phone number given for her to contact PAD rehab.  She in on ASA, statin, blood pressure is well controlled.  Not sure if she would benefit from cilostazol as her pain improves rather than worsens with ambulation.  I will reach out to vascular to see if there are any other suggestions, as a daily narcotic for PAD  pain is not a preferable solution.         She is planning to follow up in a month or two for ABDIFATAH Childs MD  [facility]

## 2018-06-08 ENCOUNTER — HOSPITAL ENCOUNTER (OUTPATIENT)
Dept: CARDIAC REHAB | Facility: CLINIC | Age: 73
End: 2018-06-08
Attending: INTERNAL MEDICINE
Payer: COMMERCIAL

## 2018-06-11 ENCOUNTER — OFFICE VISIT (OUTPATIENT)
Dept: FAMILY MEDICINE | Facility: CLINIC | Age: 73
End: 2018-06-11
Payer: COMMERCIAL

## 2018-06-11 VITALS
TEMPERATURE: 97.3 F | DIASTOLIC BLOOD PRESSURE: 72 MMHG | SYSTOLIC BLOOD PRESSURE: 148 MMHG | BODY MASS INDEX: 22.6 KG/M2 | HEART RATE: 76 BPM | WEIGHT: 140 LBS | OXYGEN SATURATION: 99 %

## 2018-06-11 DIAGNOSIS — R20.2 PARESTHESIA: ICD-10-CM

## 2018-06-11 DIAGNOSIS — I73.9 PAD (PERIPHERAL ARTERY DISEASE) (H): Primary | ICD-10-CM

## 2018-06-11 PROCEDURE — 99213 OFFICE O/P EST LOW 20 MIN: CPT | Performed by: INTERNAL MEDICINE

## 2018-06-11 RX ORDER — HYDROCODONE BITARTRATE AND ACETAMINOPHEN 5; 325 MG/1; MG/1
1 TABLET ORAL EVERY 6 HOURS PRN
Qty: 40 TABLET | Refills: 0 | Status: ON HOLD | OUTPATIENT
Start: 2018-06-11 | End: 2018-06-28

## 2018-06-11 NOTE — PATIENT INSTRUCTIONS
Appt with Dr. Meraz tomorrow - JODIE at 9:15am, appointment at 10am.   Same place as appt with Dr. Jerome - check in 3rd floor of [facility]

## 2018-06-11 NOTE — MR AVS SNAPSHOT
After Visit Summary   6/11/2018    Trish Wu    MRN: [id number]           Patient Information     Date Of Birth          1945        Visit Information        Provider Department      6/11/2018 10:40 AM Darline Childs MD [facility] [place]        Care Instructions    Appt with Dr. Meraz tomorrow - JODIE at 9:15am, appointment at 10am.   Same place as appt with Dr. Jerome - check in 3rd floor of [facility]           Follow-ups after your visit        Follow-up notes from your care team     Return in about 1 day (around 6/12/2018) for vascular surgery .      Your next 10 appointments already scheduled     Jun 12, 2018  9:15 AM CDT   US JODIE DOPPLER WITH EXERCISE BILATERAL with SHVUS1   [facility] ([facility])    [address]  [facility] [address]   [phone]           Please bring a list of your medicines (including vitamins, minerals and over-the-counter drugs). Also, tell your doctor about any allergies you may have. Wear comfortable clothes and leave your valuables at home.  No caffeine or tobacco for 1 hour prior to exam.  Please call the Imaging Department at your exam site with any questions.            Jun 12, 2018 10:00 AM CDT   Return Visit with Ulisses Meraz MD   [facility] ([facility])    [address]  [facility] [id number] [phone]              Who to contact     If you have questions or need follow up information about today's clinic visit or your schedule please contact [facility] [place] directly at [phone].  Normal or non-critical lab and imaging results will be communicated to you by MyChart, letter or phone within 4 business days after the clinic has received the results. If you do not hear from us within 7 days, please contact the clinic through MyChart or phone. If you  have a critical or abnormal lab result, we will notify you by phone as soon as possible.  Submit refill requests through [email or web address] or call your pharmacy and they will forward the refill request to us. Please allow 3 business days for your refill to be completed.          Additional Information About Your Visit        Floxxhart Information     [email or web address] gives you secure access to your electronic health record. If you see a primary care provider, you can also send messages to your care team and make appointments. If you have questions, please call your primary care clinic.  If you do not have a primary care provider, please call [phone] and they will assist you.        Care EveryWhere ID     This is your Care EveryWhere ID. This could be used by other organizations to access your [place] medical records  [id number]        Your Vitals Were     Pulse Temperature Pulse Oximetry BMI (Body Mass Index)          76 97.3  F (36.3  C) (Tympanic) 99% 22.6 kg/m2         Blood Pressure from Last 3 Encounters:   06/11/18 148/72   05/30/18 110/60   05/11/18 122/68    Weight from Last 3 Encounters:   06/11/18 140 lb (63.5 kg)   05/30/18 136 lb (61.7 kg)   05/11/18 136 lb (61.7 kg)              Today, you had the following     No orders found for display       Primary Care Provider Office Phone # Fax #    Jermain Arcos [phone]       [address]  [place] [address]        Equal Access to Services     [facility]: Hadii aad ku hadasho Soomaali, waaxda luqadaha, qaybta kaalmada adeegyada, lizeth brito . So [facility] [phone].    ATENCIÓN: Si habla español, tiene a valencia disposición servicios gratuitos de asistencia lingüística. Llame al [phone].    We comply with applicable federal civil rights laws and [place] laws. We do not discriminate on the basis of race, color, national origin, age, disability, sex, sexual orientation, or gender identity.            Thank you!      Thank you for choosing [facility] [place]  for your care. Our goal is always to provide you with excellent care. Hearing back from our patients is one way we can continue to improve our services. Please take a few minutes to complete the written survey that you may receive in the mail after your visit with us. Thank you!             Your Updated Medication List - Protect others around you: Learn how to safely use, store and throw away your medicines at www.disposemymeds.org.          This list is accurate as of 6/11/18 11:12 AM.  Always use your most recent med list.                   Brand Name Dispense Instructions for use Diagnosis    ASPIRIN EC PO      Take 81 mg by mouth daily        atorvastatin 20 MG tablet    LIPITOR    90 tablet    Take 1 tablet (20 mg) by mouth daily    PAD (peripheral artery disease) (H)       Calcium Carb-Cholecalciferol 500-400 MG-UNIT Tabs    calcium 500 +D     Take 1 tablet by mouth 2 times daily    Osteopenia, unspecified location       gabapentin 300 MG capsule    NEURONTIN    90 capsule    Take 1 capsule (300 mg) by mouth 3 times daily    Numbness of right foot       HYDROcodone-acetaminophen 5-325 MG per tablet    NORCO    30 tablet    Take 1 tablet by mouth 2 times daily as needed for severe pain    Paresthesia       lisinopril-hydrochlorothiazide 20-25 MG per tablet    PRINZIDE/ZESTORETIC    90 tablet    Take 1 tablet by mouth daily . Need appointment and lab for further refill    Essential hypertension with goal blood pressure less than 140/90

## 2018-06-11 NOTE — TELEPHONE ENCOUNTER
script picked up by patient on ? Not listed on label.  Received consent back-nothing was checked-TC called patient and got verbal  Ok for Son Karlo Bowie ok to discuss / anything.  Consent sent to stat abstracting    Susie Crain TC

## 2018-06-11 NOTE — PROGRESS NOTES
SUBJECTIVE:   Trish Wu is a 73 year old female who presents to clinic today for the following health issues:    Trish TORRES for foot pain.  She is accompanied by her daughter-in-law, Beth.  She has been just miserable with this foot pain.  Having to take the oxycodone more often.  She has to walk so often at night she is not really getting much sleep.  She did go to PAD rehab last week.  Beth is concerned about the amount of pain she is in - normal Trish is a very independent and active person.  This pain is really getting her down, Beth knows when she is asking for help this much and complaining of pain, this is very out of character for her.  Sometimes getting foot drop also.       Reviewed and updated as needed this visit by clinical staff  Tobacco  Allergies  Meds       Reviewed and updated as needed this visit by Provider         ROS:  Msk, cv, psych reviewed, otherwise negative unless noted above.       OBJECTIVE:     /72 (BP Location: Left arm, Patient Position: Chair, Cuff Size: Adult Regular)  Pulse 76  Temp 97.3  F (36.3  C) (Tympanic)  Wt 140 lb (63.5 kg)  SpO2 99%  BMI 22.6 kg/m2  Body mass index is 22.6 kg/(m^2).    Gen: pleasant, tearful woman  Ext: right foot toes are red and slightly swollen, remainder of foot is not edematous, TTP.         ASSESSMENT/PLAN:         ICD-10-CM    1. PAD (peripheral artery disease) (H) I73.9    2. Paresthesia R20.2 HYDROcodone-acetaminophen (NORCO) 5-325 MG per tablet     I had staff messaged her vascular surgeon after her last appointment and was under the impression their office was going to reach out to her, but it doesn't look like that happened.  I called the vascular clinic and made her an appointment for tomorrow morning.        Follow up - with vascular       Darline Childs MD  [facility]

## 2018-06-12 ENCOUNTER — HOSPITAL ENCOUNTER (OUTPATIENT)
Dept: ULTRASOUND IMAGING | Facility: CLINIC | Age: 73
Discharge: HOME | End: 2018-06-12
Attending: SURGERY | Admitting: SURGERY
Payer: COMMERCIAL

## 2018-06-12 ENCOUNTER — OFFICE VISIT (OUTPATIENT)
Dept: OTHER | Facility: CLINIC | Age: 73
End: 2018-06-12
Attending: SURGERY
Payer: COMMERCIAL

## 2018-06-12 VITALS — HEART RATE: 68 BPM | SYSTOLIC BLOOD PRESSURE: 117 MMHG | DIASTOLIC BLOOD PRESSURE: 59 MMHG

## 2018-06-12 DIAGNOSIS — I73.9 PAD (PERIPHERAL ARTERY DISEASE) (H): Primary | ICD-10-CM

## 2018-06-12 DIAGNOSIS — I73.9 PAD (PERIPHERAL ARTERY DISEASE) (H): ICD-10-CM

## 2018-06-12 PROCEDURE — 93922 UPR/L XTREMITY ART 2 LEVELS: CPT

## 2018-06-12 PROCEDURE — 99214 OFFICE O/P EST MOD 30 MIN: CPT | Mod: ZP | Performed by: SURGERY

## 2018-06-12 PROCEDURE — G0463 HOSPITAL OUTPT CLINIC VISIT: HCPCS

## 2018-06-12 NOTE — NURSING NOTE
Patient Education    Procedure: Aortobifemoral Bypass  Diagnosis: aorto-occlusive disease  Anticoagulation Instruction: none  Pre-Operative Physical Exam: You need to have a pre-op physical exam within 30 days of your procedure. Your procedure may be cancelled if you do not have a current History and Physical. Call your PCP's office to schedule.  Allergies:  Updated in Epic  Bowel Prep: clear liquids 24 hours, magnesium citrate  Post Procedure Education: Vascular [facility] patient post-procedure fact sheet reviewed with patient.    Learner(s):patient and daughter  Method: Listening, Reading  Barriers to Learning:No Barrier  Outcome: Patient did verbalize understanding of above education.    Sofia Kim, ALYSSAN, RN

## 2018-06-12 NOTE — NURSING NOTE
"Trish Wu is a 73 year old female who presents for:  Chief Complaint   Patient presents with     RECHECK     JODIE w/ ex (9:15 VHC; 10:00 HLS) follow up leg pain per PCP        Vitals:    Vitals:    06/12/18 0949   BP: 117/59   BP Location: Right arm   Patient Position: Chair   Cuff Size: Adult Regular   Pulse: 68       BMI:  Estimated body mass index is 22.6 kg/(m^2) as calculated from the following:    Height as of 5/4/18: 5' 6\" (1.676 m).    Weight as of 6/11/18: 140 lb (63.5 kg).    Pain Score:  Data Unavailable        Krystal Weinstein"

## 2018-06-12 NOTE — PROGRESS NOTES
Patient known to me following for AAA--she now presents with numbness and rest pain right foot.  ABIs today are now .16 on the right and .42 on the left at rest. Reviewed CTA from 5/2/18 in detail with the  Patient.  She has  Severe aortoiliac occlusive  Disease R>L as well as femoral popliteal  Occlusive disease bilaterally.  Her AAA has a maximum diameter of 4.3 cm.  She has limb threatening ischemia on the right.  I think the best option is repair of her AAA with an aorto bilateral femoral  Bypass.  Hopefully  Increasing inflow will alleviate her  Symptoms  Without  Adding a femoral popliteal by pass  This  Time.  Discussed risks goals and alternatives in detail with  The patient and her  Daughter.  They  Appear to understand and wish to proceed.  Face to face time 25 minutes greater than 50% in consultation.

## 2018-06-12 NOTE — MR AVS SNAPSHOT
After Visit Summary   6/12/2018    Trish Wu    MRN: [id number]           Patient Information     Date Of Birth          1945        Visit Information        Provider Department      6/12/2018 10:00 AM Ulisses Meraz MD [facility]  [facility] [place]      Today's Diagnoses     PAD (peripheral artery disease) (H)    -  1       Follow-ups after your visit        Your next 10 appointments already scheduled     Jun 13, 2018 12:20 PM CDT   Office Visit with Darline Childs MD   [facility] ([facility])    [address] [place] [id number] [phone]           Bring a current list of meds and any records pertaining to this visit. For Physicals, please bring immunization records and any forms needing to be filled out. Please arrive 10 minutes early to complete paperwork.              Future tests that were ordered for you today     Open Future Orders        Priority Expected Expires Ordered    US JODIE Doppler No Exercise Routine  6/11/2019 6/11/2018            Who to contact     If you have questions or need follow up information about today's clinic visit or your schedule please contact [facility] directly at [phone].  Normal or non-critical lab and imaging results will be communicated to you by Bottlehart, letter or phone within 4 business days after the clinic has received the results. If you do not hear from us within 7 days, please contact the clinic through Bottlehart or phone. If you have a critical or abnormal lab result, we will notify you by phone as soon as possible.  Submit refill requests through Calm or call your pharmacy and they will forward the refill request to us. Please allow 3 business days for your refill to be completed.          Additional Information About Your Visit        Calm Information     Calm gives you secure access to  your electronic health record. If you see a primary care provider, you can also send messages to your care team and make appointments. If you have questions, please call your primary care clinic.  If you do not have a primary care provider, please call [phone] and they will assist you.        Care EveryWhere ID     This is your Care EveryWhere ID. This could be used by other organizations to access your [place] medical records  [id number]        Your Vitals Were     Pulse                   68            Blood Pressure from Last 3 Encounters:   06/12/18 117/59   06/11/18 148/72   05/30/18 110/60    Weight from Last 3 Encounters:   06/11/18 63.5 kg (140 lb)   05/30/18 61.7 kg (136 lb)   05/11/18 61.7 kg (136 lb)              Today, you had the following     No orders found for display       Primary Care Provider Office Phone # Fax #    Jermain Arcos [phone]       [address]  [place] [address]        Equal Access to Services     [facility]: Hadii aad ku hadasho Soomaali, waaxda luqadaha, qaybta kaalmada adeegyada, lizeth wing haynroah brito . So [facility] [phone].    ATENCIÓN: Si habla español, tiene a valencia disposición servicios gratuitos de asistencia lingüística. Llame al [phone].    We comply with applicable federal civil rights laws and [place] laws. We do not discriminate on the basis of race, color, national origin, age, disability, sex, sexual orientation, or gender identity.            Thank you!     Thank you for choosing [facility] [place]  for your care. Our goal is always to provide you with excellent care. Hearing back from our patients is one way we can continue to improve our services. Please take a few minutes to complete the written survey that you may receive in the mail after your visit with us. Thank you!             Your Updated Medication List - Protect others around you: Learn how to safely use, store and throw away  your medicines at www.disposemymeds.org.          This list is accurate as of 6/12/18 11:43 AM.  Always use your most recent med list.                   Brand Name Dispense Instructions for use Diagnosis    ASPIRIN EC PO      Take 81 mg by mouth daily        atorvastatin 20 MG tablet    LIPITOR    90 tablet    Take 1 tablet (20 mg) by mouth daily    PAD (peripheral artery disease) (H)       Calcium Carb-Cholecalciferol 500-400 MG-UNIT Tabs    calcium 500 +D     Take 1 tablet by mouth 2 times daily    Osteopenia, unspecified location       gabapentin 300 MG capsule    NEURONTIN    90 capsule    Take 1 capsule (300 mg) by mouth 3 times daily    Numbness of right foot       HYDROcodone-acetaminophen 5-325 MG per tablet    NORCO    40 tablet    Take 1 tablet by mouth every 6 hours as needed for severe pain    Paresthesia       lisinopril-hydrochlorothiazide 20-25 MG per tablet    PRINZIDE/ZESTORETIC    90 tablet    Take 1 tablet by mouth daily . Need appointment and lab for further refill    Essential hypertension with goal blood pressure less than 140/90

## 2018-06-13 ENCOUNTER — OFFICE VISIT (OUTPATIENT)
Dept: FAMILY MEDICINE | Facility: CLINIC | Age: 73
End: 2018-06-13
Payer: COMMERCIAL

## 2018-06-13 VITALS
TEMPERATURE: 98.2 F | WEIGHT: 139 LBS | HEART RATE: 78 BPM | BODY MASS INDEX: 22.44 KG/M2 | SYSTOLIC BLOOD PRESSURE: 110 MMHG | DIASTOLIC BLOOD PRESSURE: 68 MMHG | OXYGEN SATURATION: 99 %

## 2018-06-13 DIAGNOSIS — E87.1 HYPONATREMIA: ICD-10-CM

## 2018-06-13 DIAGNOSIS — I71.40 ABDOMINAL AORTIC ANEURYSM (AAA) WITHOUT RUPTURE (H): ICD-10-CM

## 2018-06-13 DIAGNOSIS — I73.9 PAD (PERIPHERAL ARTERY DISEASE) (H): ICD-10-CM

## 2018-06-13 DIAGNOSIS — Z01.818 PREOP GENERAL PHYSICAL EXAM: Primary | ICD-10-CM

## 2018-06-13 DIAGNOSIS — I10 ESSENTIAL HYPERTENSION WITH GOAL BLOOD PRESSURE LESS THAN 140/90: ICD-10-CM

## 2018-06-13 DIAGNOSIS — D64.9 ANEMIA, UNSPECIFIED TYPE: ICD-10-CM

## 2018-06-13 LAB
ERYTHROCYTE [DISTWIDTH] IN BLOOD BY AUTOMATED COUNT: 13.7 % (ref 10–15)
HCT VFR BLD AUTO: 24.8 % (ref 35–47)
HGB BLD-MCNC: 8.1 G/DL (ref 11.7–15.7)
MCH RBC QN AUTO: 26.9 PG (ref 26.5–33)
MCHC RBC AUTO-ENTMCNC: 32.7 G/DL (ref 31.5–36.5)
MCV RBC AUTO: 82 FL (ref 78–100)
PLATELET # BLD AUTO: 335 10E9/L (ref 150–450)
RBC # BLD AUTO: 3.01 10E12/L (ref 3.8–5.2)
WBC # BLD AUTO: 5.4 10E9/L (ref 4–11)

## 2018-06-13 PROCEDURE — 80048 BASIC METABOLIC PNL TOTAL CA: CPT | Performed by: INTERNAL MEDICINE

## 2018-06-13 PROCEDURE — 99215 OFFICE O/P EST HI 40 MIN: CPT | Performed by: INTERNAL MEDICINE

## 2018-06-13 PROCEDURE — 93000 ELECTROCARDIOGRAM COMPLETE: CPT | Performed by: INTERNAL MEDICINE

## 2018-06-13 PROCEDURE — 85027 COMPLETE CBC AUTOMATED: CPT | Performed by: INTERNAL MEDICINE

## 2018-06-13 PROCEDURE — 36415 COLL VENOUS BLD VENIPUNCTURE: CPT | Performed by: INTERNAL MEDICINE

## 2018-06-13 NOTE — PROGRESS NOTES
[address] [address]  [phone]  Dept: [phone]    PRE-OP EVALUATION:  Today's date: 2018    Trish Wu (: 1945) presents for pre-operative evaluation assessment as requested by Dr. Meraz .  She requires evaluation and anesthesia risk assessment prior to undergoing surgery/procedure for treatment of PAD.    Proposed Surgery/ Procedure: BYPASS GRAFT AORTOFEMORAL  Date of Surgery/ Procedure: 18  Time of Surgery/ Procedure: 12:30 pm   Hospital/Surgical Facility: [facility]    Primary Physician: Jermain Arcos  Type of Anesthesia Anticipated: General    Patient has a Health Care Directive or Living Will:  NO    1. NO - Do you have a history of heart attack, stroke, stent, bypass or surgery on an artery in the head, neck, heart or legs?  2. NO - Do you ever have any pain or discomfort in your chest?  3. NO - Do you have a history of  Heart Failure?  4. NO - Are you troubled by shortness of breath when: walking on the level, up a slight hill or at night?   5. NO - Do you currently have a cold, bronchitis or other respiratory infection?  6. NO - Do you have a cough, shortness of breath or wheezing?  7. YES - Do you sometimes get pains in the calves of your legs when you walk?  8. YES - Do you or anyone in your family have previous history of blood clots? Grandmother had a blood clot later in life   9. NO - Do you or does anyone in your family have a serious bleeding problem such as prolonged bleeding following surgeries or cuts?  10. NO - Have you ever had problems with anemia or been told to take iron pills?  11. NO - Have you had any abnormal blood loss such as black, tarry or bloody stools, or abnormal vaginal bleeding?  12. NO - Have you ever had a blood transfusion?  13. NO - Have you or any of your relatives ever had problems with anesthesia?  14. NO - Do you have sleep apnea, excessive snoring or daytime drowsiness?  15. NO - Do  you have any prosthetic heart valves?  16. NO - Do you have prosthetic joints?  17. NO - Is there any chance that you may be pregnant?      HPI:     HPI related to upcoming procedure: Trish has been dealing with worsening right foot pain and mild weakness.  She has severe diffuse PAD.  She is undergoing vascular surgery.      She is walking short distances frequently throughout the day without and cardiorespiratory symptoms. Just a couple months ago prior to this foot pain was walking for at least a mile or so at a time without symptoms.      HTN - well controlled on lisinopril-hctz.        Hyponatremia - check labs today         MEDICAL HISTORY:     Patient Active Problem List    Diagnosis Date Noted     PAD (peripheral artery disease) (H) 05/11/2018     Priority: Medium     Hyponatremia 08/01/2017     Priority: Medium     Lumbago 05/22/2017     Priority: Medium     Aortic aneurysm (H)      Priority: Medium     Essential hypertension with goal blood pressure less than 140/90 06/14/2016     Priority: Medium     Environmental allergies 06/14/2016     Priority: Medium     Hip pain, left      Priority: Medium     Osteopenia      Priority: Medium      Past Medical History:   Diagnosis Date     Aortic aneurysm (H)      Environmental allergies 6/14/2016     Essential hypertension with goal blood pressure less than 140/90 6/14/2016     Hip pain, left      Osteopenia      Past Surgical History:   Procedure Laterality Date     CATARACT IOL, RT/LT       HYSTERECTOMY TOTAL ABDOMINAL  1966     MAMMOPLASTY AUGMENTATION       VAGOTOMY, PYLOROPLASTY, COMBINED       Current Outpatient Prescriptions   Medication Sig Dispense Refill     ASPIRIN EC PO Take 81 mg by mouth daily       atorvastatin (LIPITOR) 20 MG tablet Take 1 tablet (20 mg) by mouth daily 90 tablet 3     Calcium Carb-Cholecalciferol (CALCIUM 500 +D) 500-400 MG-UNIT TABS Take 1 tablet by mouth 2 times daily       gabapentin (NEURONTIN) 300 MG capsule Take 1 capsule (300  mg) by mouth 3 times daily 90 capsule 3     HYDROcodone-acetaminophen (NORCO) 5-325 MG per tablet Take 1 tablet by mouth every 6 hours as needed for severe pain 40 tablet 0     lisinopril-hydrochlorothiazide (PRINZIDE/ZESTORETIC) 20-25 MG per tablet Take 1 tablet by mouth daily . Need appointment and lab for further refill 90 tablet 3     OTC products: None, except as noted above    Allergies   Allergen Reactions     Penicillins Hives      Latex Allergy: NO    Social History   Substance Use Topics     Smoking status: Former Smoker     Smokeless tobacco: Never Used     Alcohol use 0.0 oz/week     0 Standard drinks or equivalent per week      Comment: rare     History   Drug Use No       REVIEW OF SYSTEMS:   Constitutional, neuro, ENT, pulmonary, cardiac, gastrointestinal, genitourinary, musculoskeletal, integument, heme and psychiatric systems are negative, except as otherwise noted.    EXAM:   /68 (BP Location: Right arm, Patient Position: Chair, Cuff Size: Adult Regular)  Pulse 78  Temp 98.2  F (36.8  C) (Tympanic)  Wt 139 lb (63 kg)  SpO2 99%  BMI 22.44 kg/m2    Gen: well appearing, pleasant woman, no distress  HEENT: PERRL, sclera nonicteric, oropharynx clear, MMM  Neck: supple, no LAD  Pulm: breathing comfortably, CTAB, no wheezes or rales  CV: RRR, normal S1 and S2, no murmurs  Abd: BS present, soft, nontender, nondistended  Ext: decreased distal pulses, no LE edema       DIAGNOSTICS:   EKG - sinus rhythm, normal axis and intervals, no acute ST or T wave changes.      Labs     Results for orders placed or performed in visit on 06/13/18   Basic metabolic panel  (Ca, Cl, CO2, Creat, Gluc, K, Na, BUN)   Result Value Ref Range    Sodium 125 (L) 133 - 144 mmol/L    Potassium 3.9 3.4 - 5.3 mmol/L    Chloride 92 (L) 94 - 109 mmol/L    Carbon Dioxide 23 20 - 32 mmol/L    Anion Gap 10 3 - 14 mmol/L    Glucose 108 (H) 70 - 99 mg/dL    Urea Nitrogen 19 7 - 30 mg/dL    Creatinine 0.87 0.52 - 1.04 mg/dL    GFR  Estimate 64 >60 mL/min/1.7m2    GFR Estimate If Black 77 >60 mL/min/1.7m2    Calcium 8.6 8.5 - 10.1 mg/dL   CBC with platelets   Result Value Ref Range    WBC 5.4 4.0 - 11.0 10e9/L    RBC Count 3.01 (L) 3.8 - 5.2 10e12/L    Hemoglobin 8.1 (L) 11.7 - 15.7 g/dL    Hematocrit 24.8 (L) 35.0 - 47.0 %    MCV 82 78 - 100 fl    MCH 26.9 26.5 - 33.0 pg    MCHC 32.7 31.5 - 36.5 g/dL    RDW 13.7 10.0 - 15.0 %    Platelet Count 335 150 - 450 10e9/L         Recent Labs   Lab Test  05/01/18   1900  07/31/17   1116   HGB  10.2*  10.4*   PLT  300  300   NA  132*  128*   POTASSIUM  3.9  3.8   CR  1.04  0.97        IMPRESSION:   Reason for surgery/procedure: aortofemoral bypass  Diagnosis/reason for consult: PAD with pain and weakness in the right foot     The proposed surgical procedure is considered HIGH risk.    REVISED CARDIAC RISK INDEX  The patient has the following serious cardiovascular risks for perioperative complications such as (MI, PE, VFib and 3  AV Block):  High risk surgery (>5% cardiac complication risk)  INTERPRETATION: 1 risks: Class II (low risk - 0.9% complication rate)    The patient has the following additional risks for perioperative complications:  No identified additional risks      ICD-10-CM    1. Preop general physical exam Z01.818 EKG 12-lead complete w/read - Clinics     Basic metabolic panel  (Ca, Cl, CO2, Creat, Gluc, K, Na, BUN)     CBC with platelets   2. PAD (peripheral artery disease) (H) I73.9    3. Essential hypertension with goal blood pressure less than 140/90 I10 Basic metabolic panel  (Ca, Cl, CO2, Creat, Gluc, K, Na, BUN)   4. Abdominal aortic aneurysm (AAA) without rupture (H) I71.4    5. Anemia, unspecified type D64.9 CBC with platelets       RECOMMENDATIONS:         Temporarily deferring approval for surgery due to further workup of hyponatremia and anemia.  (see telephone encounter 6/14/2018).  I do anticipate these will be addressed and we can get her cleared for surgery in 2 weeks.      For hyponatremia - repeat BMP, check serum osms, stop lisinopril-hctz and switch to just lisinopril     For anemia - repeat CBC, iron studies, folate, vitamin D, retic.         Signed Electronically by: Darline Childs MD    Copy of this evaluation report is provided to requesting physician.    [facility] Preop Guidelines    Revised Cardiac Risk Index        ADDENDUM -   Sodium has corrected to 133 with d/c of HCTZ.    She is started on iron supplement, repeat hgb is 8.5.  Reviewed results with surgeon Dr. Meraz.  They will type and cross her prior to surgery, monitor closely, give intraop transfusion if needed.      Darline Childs MD   6/25/2018 3:56 PM

## 2018-06-13 NOTE — Clinical Note
I will addend this note when labs are back and we see if her hyponatremia improves with stopping the thiazide diuretic. Thanks!

## 2018-06-13 NOTE — PATIENT INSTRUCTIONS
Stop aspirin 7 days prior to surgery, but please double check with the surgery team.     Do not take the lisinopril-hydrochlorothiazide 24hrs prior to surgery       Before Your Surgery      Call your surgeon if there is any change in your health. This includes signs of a cold or flu (such as a sore throat, runny nose, cough, rash or fever).    Do not smoke, drink alcohol or take over the counter medicine (unless your surgeon or primary care doctor tells you to) for the 24 hours before and after surgery.    If you take prescribed drugs: Follow your doctor s orders about which medicines to take and which to stop until after surgery.    Eating and drinking prior to surgery: follow the instructions from your surgeon    Take a shower or bath the night before surgery. Use the soap your surgeon gave you to gently clean your skin. If you do not have soap from your surgeon, use your regular soap. Do not shave or scrub the surgery site.  Wear clean pajamas and have clean sheets on your bed.

## 2018-06-13 NOTE — MR AVS SNAPSHOT
After Visit Summary   6/13/2018    Trish Wu    MRN: [id number]           Patient Information     Date Of Birth          1945        Visit Information        Provider Department      6/13/2018 12:20 PM Darline Childs MD [facility] [place]        Today's Diagnoses     Preop general physical exam    -  1    PAD (peripheral artery disease) (H)        Essential hypertension with goal blood pressure less than 140/90        Abdominal aortic aneurysm (AAA) without rupture (H)        Anemia, unspecified type          Care Instructions    Stop aspirin 7 days prior to surgery, but please double check with the surgery team.     Do not take the lisinopril-hydrochlorothiazide 24hrs prior to surgery       Before Your Surgery      Call your surgeon if there is any change in your health. This includes signs of a cold or flu (such as a sore throat, runny nose, cough, rash or fever).    Do not smoke, drink alcohol or take over the counter medicine (unless your surgeon or primary care doctor tells you to) for the 24 hours before and after surgery.    If you take prescribed drugs: Follow your doctor s orders about which medicines to take and which to stop until after surgery.    Eating and drinking prior to surgery: follow the instructions from your surgeon    Take a shower or bath the night before surgery. Use the soap your surgeon gave you to gently clean your skin. If you do not have soap from your surgeon, use your regular soap. Do not shave or scrub the surgery site.  Wear clean pajamas and have clean sheets on your bed.           Follow-ups after your visit        Follow-up notes from your care team     Return in about 2 weeks (around 6/27/2018) for surgery .      Your next 10 appointments already scheduled     Jun 28, 2018   Procedure with Ulisses Meraz MD   [facility] (--)    [address]  [facility] [id number] [phone]               Future tests that were ordered for you today     Open Future Orders        Priority Expected Expires Ordered    US JODIE Doppler No Exercise Routine  6/11/2019 6/11/2018            Who to contact     If you have questions or need follow up information about today's clinic visit or your schedule please contact [facility] [place] directly at [phone].  Normal or non-critical lab and imaging results will be communicated to you by MyChart, letter or phone within 4 business days after the clinic has received the results. If you do not hear from us within 7 days, please contact the clinic through [employer] or phone. If you have a critical or abnormal lab result, we will notify you by phone as soon as possible.  Submit refill requests through [employer] or call your pharmacy and they will forward the refill request to us. Please allow 3 business days for your refill to be completed.          Additional Information About Your Visit        [employer] Information     [employer] gives you secure access to your electronic health record. If you see a primary care provider, you can also send messages to your care team and make appointments. If you have questions, please call your primary care clinic.  If you do not have a primary care provider, please call [phone] and they will assist you.        Care EveryWhere ID     This is your Care EveryWhere ID. This could be used by other organizations to access your [facility] medical records  [id number]        Your Vitals Were     Pulse Temperature Pulse Oximetry BMI (Body Mass Index)          78 98.2  F (36.8  C) (Tympanic) 99% 22.44 kg/m2         Blood Pressure from Last 3 Encounters:   06/13/18 110/68   06/12/18 117/59   06/11/18 148/72    Weight from Last 3 Encounters:   06/13/18 139 lb (63 kg)   06/11/18 140 lb (63.5 kg)   05/30/18 136 lb (61.7 kg)              We Performed the Following     Basic metabolic panel  (Ca, Cl, CO2, Creat, Gluc, K, Na, BUN)     CBC with  platelets     EKG 12-lead complete w/read - Clinics        Primary Care Provider Office Phone # Fax #    Jermain Arcos [phone]       [address]  [place] [address]        Equal Access to Services     JESSICA FLORENCE : Hadii aad ku hadasho Soomaali, waaxda luqadaha, qaybta kaalmada adeegyada, waxay idiin hayaan adeshanice khana lacyndy huber. So [facility] [phone].    ATENCIÓN: Si habla español, tiene a valencia disposición servicios gratuitos de asistencia lingüística. Llame al [phone].    We comply with applicable federal civil rights laws and [place] laws. We do not discriminate on the basis of race, color, national origin, age, disability, sex, sexual orientation, or gender identity.            Thank you!     Thank you for choosing [facility] [place]  for your care. Our goal is always to provide you with excellent care. Hearing back from our patients is one way we can continue to improve our services. Please take a few minutes to complete the written survey that you may receive in the mail after your visit with us. Thank you!             Your Updated Medication List - Protect others around you: Learn how to safely use, store and throw away your medicines at www.disposemymeds.org.          This list is accurate as of 6/13/18 12:50 PM.  Always use your most recent med list.                   Brand Name Dispense Instructions for use Diagnosis    ASPIRIN EC PO      Take 81 mg by mouth daily        atorvastatin 20 MG tablet    LIPITOR    90 tablet    Take 1 tablet (20 mg) by mouth daily    PAD (peripheral artery disease) (H)       Calcium Carb-Cholecalciferol 500-400 MG-UNIT Tabs    calcium 500 +D     Take 1 tablet by mouth 2 times daily    Osteopenia, unspecified location       gabapentin 300 MG capsule    NEURONTIN    90 capsule    Take 1 capsule (300 mg) by mouth 3 times daily    Numbness of right foot       HYDROcodone-acetaminophen 5-325 MG per tablet    NORCO    40 tablet     Take 1 tablet by mouth every 6 hours as needed for severe pain    Paresthesia       lisinopril-hydrochlorothiazide 20-25 MG per tablet    PRINZIDE/ZESTORETIC    90 tablet    Take 1 tablet by mouth daily . Need appointment and lab for further refill    Essential hypertension with goal blood pressure less than 140/90

## 2018-06-14 ENCOUNTER — TELEPHONE (OUTPATIENT)
Dept: FAMILY MEDICINE | Facility: CLINIC | Age: 73
End: 2018-06-14

## 2018-06-14 DIAGNOSIS — E87.1 HYPONATREMIA: Primary | ICD-10-CM

## 2018-06-14 DIAGNOSIS — I10 BENIGN ESSENTIAL HYPERTENSION: ICD-10-CM

## 2018-06-14 DIAGNOSIS — D64.9 ANEMIA, UNSPECIFIED TYPE: ICD-10-CM

## 2018-06-14 LAB
ANION GAP SERPL CALCULATED.3IONS-SCNC: 10 MMOL/L (ref 3–14)
BUN SERPL-MCNC: 19 MG/DL (ref 7–30)
CALCIUM SERPL-MCNC: 8.6 MG/DL (ref 8.5–10.1)
CHLORIDE SERPL-SCNC: 92 MMOL/L (ref 94–109)
CO2 SERPL-SCNC: 23 MMOL/L (ref 20–32)
CREAT SERPL-MCNC: 0.87 MG/DL (ref 0.52–1.04)
GFR SERPL CREATININE-BSD FRML MDRD: 64 ML/MIN/1.7M2
GLUCOSE SERPL-MCNC: 108 MG/DL (ref 70–99)
POTASSIUM SERPL-SCNC: 3.9 MMOL/L (ref 3.4–5.3)
SODIUM SERPL-SCNC: 125 MMOL/L (ref 133–144)

## 2018-06-14 RX ORDER — LISINOPRIL 20 MG/1
20 TABLET ORAL DAILY
Qty: 90 TABLET | Refills: 1 | Status: ON HOLD | OUTPATIENT
Start: 2018-06-14 | End: 2018-07-04

## 2018-06-14 NOTE — TELEPHONE ENCOUNTER
Please call Trish regarding lab results.    Her sodium is low at 125.  It has been on the low side over the past year or two, but it was even lower yesterday.  I recommend that she stop her lisinopril-hydrochlorothiazide, as hydrochlorothiazide can cause low sodium.  I sent in a tablet for her blood pressure that just contains lisinopril.    She is quite anemic as well, with a hemoglobin of 8.1.  Lab was not able to add on iron studies to the labs she had drawn yesterday.  I would like her to come in, this week if possible since her surgery is coming up soon, to have some more labs done to see if we can figure out why she is so anemic.    Darline Childs MD

## 2018-06-14 NOTE — TELEPHONE ENCOUNTER
Spoke with patient and informed of below. Patient/ parent verbalized understanding and agrees with plan. Lab appointment scheduled for tomorrow 6/15/18.   Parul Cadet RN   [facility] - Triage

## 2018-06-15 DIAGNOSIS — D64.9 ANEMIA, UNSPECIFIED TYPE: ICD-10-CM

## 2018-06-15 DIAGNOSIS — E87.1 HYPONATREMIA: ICD-10-CM

## 2018-06-15 LAB
ANION GAP SERPL CALCULATED.3IONS-SCNC: 11 MMOL/L (ref 3–14)
BUN SERPL-MCNC: 15 MG/DL (ref 7–30)
CALCIUM SERPL-MCNC: 9.1 MG/DL (ref 8.5–10.1)
CHLORIDE SERPL-SCNC: 92 MMOL/L (ref 94–109)
CO2 SERPL-SCNC: 23 MMOL/L (ref 20–32)
CREAT SERPL-MCNC: 0.96 MG/DL (ref 0.52–1.04)
ERYTHROCYTE [DISTWIDTH] IN BLOOD BY AUTOMATED COUNT: 13.9 % (ref 10–15)
FERRITIN SERPL-MCNC: 17 NG/ML (ref 8–252)
FOLATE SERPL-MCNC: 8.3 NG/ML
GFR SERPL CREATININE-BSD FRML MDRD: 57 ML/MIN/1.7M2
GLUCOSE SERPL-MCNC: 106 MG/DL (ref 70–99)
HCT VFR BLD AUTO: 27.1 % (ref 35–47)
HGB BLD-MCNC: 8.7 G/DL (ref 11.7–15.7)
IRON SERPL-MCNC: 22 UG/DL (ref 35–180)
MCH RBC QN AUTO: 26.2 PG (ref 26.5–33)
MCHC RBC AUTO-ENTMCNC: 32.1 G/DL (ref 31.5–36.5)
MCV RBC AUTO: 82 FL (ref 78–100)
OSMOLALITY SERPL: 264 MMOL/KG (ref 280–301)
PLATELET # BLD AUTO: 373 10E9/L (ref 150–450)
POTASSIUM SERPL-SCNC: 3.7 MMOL/L (ref 3.4–5.3)
RBC # BLD AUTO: 3.32 10E12/L (ref 3.8–5.2)
RETICS # AUTO: 57 10E9/L (ref 25–95)
RETICS/RBC NFR AUTO: 1.7 % (ref 0.5–2)
SODIUM SERPL-SCNC: 126 MMOL/L (ref 133–144)
VIT B12 SERPL-MCNC: 357 PG/ML (ref 193–986)
WBC # BLD AUTO: 5.9 10E9/L (ref 4–11)

## 2018-06-15 PROCEDURE — 82746 ASSAY OF FOLIC ACID SERUM: CPT | Performed by: INTERNAL MEDICINE

## 2018-06-15 PROCEDURE — 80048 BASIC METABOLIC PNL TOTAL CA: CPT | Performed by: INTERNAL MEDICINE

## 2018-06-15 PROCEDURE — 83930 ASSAY OF BLOOD OSMOLALITY: CPT | Performed by: INTERNAL MEDICINE

## 2018-06-15 PROCEDURE — 85045 AUTOMATED RETICULOCYTE COUNT: CPT | Performed by: INTERNAL MEDICINE

## 2018-06-15 PROCEDURE — 83540 ASSAY OF IRON: CPT | Performed by: INTERNAL MEDICINE

## 2018-06-15 PROCEDURE — 82607 VITAMIN B-12: CPT | Performed by: INTERNAL MEDICINE

## 2018-06-15 PROCEDURE — 82728 ASSAY OF FERRITIN: CPT | Performed by: INTERNAL MEDICINE

## 2018-06-15 PROCEDURE — 85027 COMPLETE CBC AUTOMATED: CPT | Performed by: INTERNAL MEDICINE

## 2018-06-15 PROCEDURE — 36415 COLL VENOUS BLD VENIPUNCTURE: CPT | Performed by: INTERNAL MEDICINE

## 2018-06-18 ENCOUNTER — TELEPHONE (OUTPATIENT)
Dept: FAMILY MEDICINE | Facility: CLINIC | Age: 73
End: 2018-06-18

## 2018-06-18 ENCOUNTER — TELEPHONE (OUTPATIENT)
Dept: OTHER | Facility: CLINIC | Age: 73
End: 2018-06-18

## 2018-06-18 DIAGNOSIS — D64.9 ANEMIA, UNSPECIFIED TYPE: ICD-10-CM

## 2018-06-18 DIAGNOSIS — E87.1 HYPONATREMIA: Primary | ICD-10-CM

## 2018-06-18 DIAGNOSIS — I73.9 SEVERE PERIPHERAL ARTERIAL DISEASE (H): ICD-10-CM

## 2018-06-18 RX ORDER — OXYCODONE AND ACETAMINOPHEN 5; 325 MG/1; MG/1
1 TABLET ORAL EVERY 6 HOURS PRN
Qty: 40 TABLET | Refills: 0 | Status: ON HOLD | OUTPATIENT
Start: 2018-06-18 | End: 2018-07-04

## 2018-06-18 NOTE — TELEPHONE ENCOUNTER
Patent called to report that her PCP gave her stronger pain meds to use in the mean time while waiting for surgery.  Will route to Dr Calero Nurse as NAE Love, RN, BSN

## 2018-06-18 NOTE — TELEPHONE ENCOUNTER
She is needing quite a bit of pain medication, last rx for hydrocodone-APAP was written on 6/11 for 40 tabs.  I wrote one for oxycodone-APAP, hopefully this should last until her surgery.  Please have them call and update her vascular surgeon as well about her pain level.      Darline Childs MD

## 2018-06-18 NOTE — TELEPHONE ENCOUNTER
I let her know the Rx will be at our pharmacy.  Her son will come and he has consent.    She will call her vascular surgeon to update them on her pain.  Kim Franklin RN- Triage [employer]

## 2018-06-18 NOTE — TELEPHONE ENCOUNTER
Patient in clinic today. Reports pain has been worsening and spikes at times. She is concerned with how her pain is worsening and states her medication is not adequately managing her pain. She is not sleeping at night. She has 10 days until surgery and worrying about pain continuing to worsen until her surgery date. Wondering if an adjustment can be made to her medication? Please advise. If new rx is given, please deliver to [facility] pharmacy.     Also, result note from 6/17/18 relayed to patient and she agrees to plan. Future labs not ordered, please review and order.     Triage to call with response [phone] okay to leave detailed message.     Parul Cadet RN   [facility] - Triage

## 2018-06-22 DIAGNOSIS — D64.9 ANEMIA, UNSPECIFIED TYPE: ICD-10-CM

## 2018-06-22 DIAGNOSIS — E87.1 HYPONATREMIA: ICD-10-CM

## 2018-06-22 LAB
ANION GAP SERPL CALCULATED.3IONS-SCNC: 9 MMOL/L (ref 3–14)
BUN SERPL-MCNC: 10 MG/DL (ref 7–30)
CALCIUM SERPL-MCNC: 8.8 MG/DL (ref 8.5–10.1)
CHLORIDE SERPL-SCNC: 101 MMOL/L (ref 94–109)
CO2 SERPL-SCNC: 23 MMOL/L (ref 20–32)
CREAT SERPL-MCNC: 0.81 MG/DL (ref 0.52–1.04)
ERYTHROCYTE [DISTWIDTH] IN BLOOD BY AUTOMATED COUNT: 14.3 % (ref 10–15)
GFR SERPL CREATININE-BSD FRML MDRD: 70 ML/MIN/1.7M2
GLUCOSE SERPL-MCNC: 101 MG/DL (ref 70–99)
HCT VFR BLD AUTO: 27.6 % (ref 35–47)
HGB BLD-MCNC: 8.5 G/DL (ref 11.7–15.7)
MCH RBC QN AUTO: 26.1 PG (ref 26.5–33)
MCHC RBC AUTO-ENTMCNC: 30.8 G/DL (ref 31.5–36.5)
MCV RBC AUTO: 85 FL (ref 78–100)
PLATELET # BLD AUTO: 403 10E9/L (ref 150–450)
POTASSIUM SERPL-SCNC: 4.7 MMOL/L (ref 3.4–5.3)
RBC # BLD AUTO: 3.26 10E12/L (ref 3.8–5.2)
SODIUM SERPL-SCNC: 133 MMOL/L (ref 133–144)
WBC # BLD AUTO: 5.4 10E9/L (ref 4–11)

## 2018-06-22 PROCEDURE — 36415 COLL VENOUS BLD VENIPUNCTURE: CPT | Performed by: INTERNAL MEDICINE

## 2018-06-22 PROCEDURE — 85027 COMPLETE CBC AUTOMATED: CPT | Performed by: INTERNAL MEDICINE

## 2018-06-22 PROCEDURE — 80048 BASIC METABOLIC PNL TOTAL CA: CPT | Performed by: INTERNAL MEDICINE

## 2018-06-25 ENCOUNTER — DOCUMENTATION ONLY (OUTPATIENT)
Dept: OTHER | Facility: CLINIC | Age: 73
End: 2018-06-25

## 2018-06-25 DIAGNOSIS — Z71.89 ADVANCED DIRECTIVES, COUNSELING/DISCUSSION: Chronic | ICD-10-CM

## 2018-06-26 NOTE — H&P (VIEW-ONLY)
[address] [address]  [phone]  Dept: [phone]    PRE-OP EVALUATION:  Today's date: 2018    Trish Wu (: 1945) presents for pre-operative evaluation assessment as requested by Dr. Meraz .  She requires evaluation and anesthesia risk assessment prior to undergoing surgery/procedure for treatment of PAD.    Proposed Surgery/ Procedure: BYPASS GRAFT AORTOFEMORAL  Date of Surgery/ Procedure: 18  Time of Surgery/ Procedure: 12:30 pm   Hospital/Surgical Facility: [facility]    Primary Physician: Jermain Arcos  Type of Anesthesia Anticipated: General    Patient has a Health Care Directive or Living Will:  NO    1. NO - Do you have a history of heart attack, stroke, stent, bypass or surgery on an artery in the head, neck, heart or legs?  2. NO - Do you ever have any pain or discomfort in your chest?  3. NO - Do you have a history of  Heart Failure?  4. NO - Are you troubled by shortness of breath when: walking on the level, up a slight hill or at night?   5. NO - Do you currently have a cold, bronchitis or other respiratory infection?  6. NO - Do you have a cough, shortness of breath or wheezing?  7. YES - Do you sometimes get pains in the calves of your legs when you walk?  8. YES - Do you or anyone in your family have previous history of blood clots? Grandmother had a blood clot later in life   9. NO - Do you or does anyone in your family have a serious bleeding problem such as prolonged bleeding following surgeries or cuts?  10. NO - Have you ever had problems with anemia or been told to take iron pills?  11. NO - Have you had any abnormal blood loss such as black, tarry or bloody stools, or abnormal vaginal bleeding?  12. NO - Have you ever had a blood transfusion?  13. NO - Have you or any of your relatives ever had problems with anesthesia?  14. NO - Do you have sleep apnea, excessive snoring or daytime drowsiness?  15. NO - Do  you have any prosthetic heart valves?  16. NO - Do you have prosthetic joints?  17. NO - Is there any chance that you may be pregnant?      HPI:     HPI related to upcoming procedure: Trish has been dealing with worsening right foot pain and mild weakness.  She has severe diffuse PAD.  She is undergoing vascular surgery.      She is walking short distances frequently throughout the day without and cardiorespiratory symptoms. Just a couple months ago prior to this foot pain was walking for at least a mile or so at a time without symptoms.      HTN - well controlled on lisinopril-hctz.        Hyponatremia - check labs today         MEDICAL HISTORY:     Patient Active Problem List    Diagnosis Date Noted     PAD (peripheral artery disease) (H) 05/11/2018     Priority: Medium     Hyponatremia 08/01/2017     Priority: Medium     Lumbago 05/22/2017     Priority: Medium     Aortic aneurysm (H)      Priority: Medium     Essential hypertension with goal blood pressure less than 140/90 06/14/2016     Priority: Medium     Environmental allergies 06/14/2016     Priority: Medium     Hip pain, left      Priority: Medium     Osteopenia      Priority: Medium      Past Medical History:   Diagnosis Date     Aortic aneurysm (H)      Environmental allergies 6/14/2016     Essential hypertension with goal blood pressure less than 140/90 6/14/2016     Hip pain, left      Osteopenia      Past Surgical History:   Procedure Laterality Date     CATARACT IOL, RT/LT       HYSTERECTOMY TOTAL ABDOMINAL  1966     MAMMOPLASTY AUGMENTATION       VAGOTOMY, PYLOROPLASTY, COMBINED       Current Outpatient Prescriptions   Medication Sig Dispense Refill     ASPIRIN EC PO Take 81 mg by mouth daily       atorvastatin (LIPITOR) 20 MG tablet Take 1 tablet (20 mg) by mouth daily 90 tablet 3     Calcium Carb-Cholecalciferol (CALCIUM 500 +D) 500-400 MG-UNIT TABS Take 1 tablet by mouth 2 times daily       gabapentin (NEURONTIN) 300 MG capsule Take 1 capsule (300  mg) by mouth 3 times daily 90 capsule 3     HYDROcodone-acetaminophen (NORCO) 5-325 MG per tablet Take 1 tablet by mouth every 6 hours as needed for severe pain 40 tablet 0     lisinopril-hydrochlorothiazide (PRINZIDE/ZESTORETIC) 20-25 MG per tablet Take 1 tablet by mouth daily . Need appointment and lab for further refill 90 tablet 3     OTC products: None, except as noted above    Allergies   Allergen Reactions     Penicillins Hives      Latex Allergy: NO    Social History   Substance Use Topics     Smoking status: Former Smoker     Smokeless tobacco: Never Used     Alcohol use 0.0 oz/week     0 Standard drinks or equivalent per week      Comment: rare     History   Drug Use No       REVIEW OF SYSTEMS:   Constitutional, neuro, ENT, pulmonary, cardiac, gastrointestinal, genitourinary, musculoskeletal, integument, heme and psychiatric systems are negative, except as otherwise noted.    EXAM:   /68 (BP Location: Right arm, Patient Position: Chair, Cuff Size: Adult Regular)  Pulse 78  Temp 98.2  F (36.8  C) (Tympanic)  Wt 139 lb (63 kg)  SpO2 99%  BMI 22.44 kg/m2    Gen: well appearing, pleasant woman, no distress  HEENT: PERRL, sclera nonicteric, oropharynx clear, MMM  Neck: supple, no LAD  Pulm: breathing comfortably, CTAB, no wheezes or rales  CV: RRR, normal S1 and S2, no murmurs  Abd: BS present, soft, nontender, nondistended  Ext: decreased distal pulses, no LE edema       DIAGNOSTICS:   EKG - sinus rhythm, normal axis and intervals, no acute ST or T wave changes.      Labs     Results for orders placed or performed in visit on 06/13/18   Basic metabolic panel  (Ca, Cl, CO2, Creat, Gluc, K, Na, BUN)   Result Value Ref Range    Sodium 125 (L) 133 - 144 mmol/L    Potassium 3.9 3.4 - 5.3 mmol/L    Chloride 92 (L) 94 - 109 mmol/L    Carbon Dioxide 23 20 - 32 mmol/L    Anion Gap 10 3 - 14 mmol/L    Glucose 108 (H) 70 - 99 mg/dL    Urea Nitrogen 19 7 - 30 mg/dL    Creatinine 0.87 0.52 - 1.04 mg/dL    GFR  Estimate 64 >60 mL/min/1.7m2    GFR Estimate If Black 77 >60 mL/min/1.7m2    Calcium 8.6 8.5 - 10.1 mg/dL   CBC with platelets   Result Value Ref Range    WBC 5.4 4.0 - 11.0 10e9/L    RBC Count 3.01 (L) 3.8 - 5.2 10e12/L    Hemoglobin 8.1 (L) 11.7 - 15.7 g/dL    Hematocrit 24.8 (L) 35.0 - 47.0 %    MCV 82 78 - 100 fl    MCH 26.9 26.5 - 33.0 pg    MCHC 32.7 31.5 - 36.5 g/dL    RDW 13.7 10.0 - 15.0 %    Platelet Count 335 150 - 450 10e9/L         Recent Labs   Lab Test  05/01/18   1900  07/31/17   1116   HGB  10.2*  10.4*   PLT  300  300   NA  132*  128*   POTASSIUM  3.9  3.8   CR  1.04  0.97        IMPRESSION:   Reason for surgery/procedure: aortofemoral bypass  Diagnosis/reason for consult: PAD with pain and weakness in the right foot     The proposed surgical procedure is considered HIGH risk.    REVISED CARDIAC RISK INDEX  The patient has the following serious cardiovascular risks for perioperative complications such as (MI, PE, VFib and 3  AV Block):  High risk surgery (>5% cardiac complication risk)  INTERPRETATION: 1 risks: Class II (low risk - 0.9% complication rate)    The patient has the following additional risks for perioperative complications:  No identified additional risks      ICD-10-CM    1. Preop general physical exam Z01.818 EKG 12-lead complete w/read - Clinics     Basic metabolic panel  (Ca, Cl, CO2, Creat, Gluc, K, Na, BUN)     CBC with platelets   2. PAD (peripheral artery disease) (H) I73.9    3. Essential hypertension with goal blood pressure less than 140/90 I10 Basic metabolic panel  (Ca, Cl, CO2, Creat, Gluc, K, Na, BUN)   4. Abdominal aortic aneurysm (AAA) without rupture (H) I71.4    5. Anemia, unspecified type D64.9 CBC with platelets       RECOMMENDATIONS:         Temporarily deferring approval for surgery due to further workup of hyponatremia and anemia.  (see telephone encounter 6/14/2018).  I do anticipate these will be addressed and we can get her cleared for surgery in 2 weeks.      For hyponatremia - repeat BMP, check serum osms, stop lisinopril-hctz and switch to just lisinopril     For anemia - repeat CBC, iron studies, folate, vitamin D, retic.         Signed Electronically by: Darline Childs MD    Copy of this evaluation report is provided to requesting physician.    [facility] Preop Guidelines    Revised Cardiac Risk Index        ADDENDUM -   Sodium has corrected to 133 with d/c of HCTZ.    She is started on iron supplement, repeat hgb is 8.5.  Reviewed results with surgeon Dr. Meraz.  They will type and cross her prior to surgery, monitor closely, give intraop transfusion if needed.      Darline Childs MD   6/25/2018 3:56 PM

## 2018-06-27 ENCOUNTER — ANESTHESIA EVENT (OUTPATIENT)
Dept: SURGERY | Facility: CLINIC | Age: 73
DRG: 269 | End: 2018-06-27
Payer: COMMERCIAL

## 2018-06-28 ENCOUNTER — APPOINTMENT (OUTPATIENT)
Dept: GENERAL RADIOLOGY | Facility: CLINIC | Age: 73
DRG: 269 | End: 2018-06-28
Attending: SURGERY
Payer: COMMERCIAL

## 2018-06-28 ENCOUNTER — ANESTHESIA (OUTPATIENT)
Dept: SURGERY | Facility: CLINIC | Age: 73
DRG: 269 | End: 2018-06-28
Payer: COMMERCIAL

## 2018-06-28 ENCOUNTER — APPOINTMENT (OUTPATIENT)
Dept: GENERAL RADIOLOGY | Facility: CLINIC | Age: 73
DRG: 269 | End: 2018-06-28
Attending: ANESTHESIOLOGY
Payer: COMMERCIAL

## 2018-06-28 ENCOUNTER — OFFICE VISIT (OUTPATIENT)
Dept: SURGERY | Facility: PHYSICIAN GROUP | Age: 73
End: 2018-06-28
Payer: COMMERCIAL

## 2018-06-28 ENCOUNTER — HOSPITAL ENCOUNTER (INPATIENT)
Facility: CLINIC | Age: 73
LOS: 6 days | Discharge: HOME HEALTH | DRG: 269 | End: 2018-07-04
Attending: SURGERY | Admitting: SURGERY
Payer: COMMERCIAL

## 2018-06-28 DIAGNOSIS — K21.00 GASTROESOPHAGEAL REFLUX DISEASE WITH ESOPHAGITIS: ICD-10-CM

## 2018-06-28 DIAGNOSIS — K59.00 CONSTIPATION, UNSPECIFIED CONSTIPATION TYPE: ICD-10-CM

## 2018-06-28 DIAGNOSIS — I71.40 ABDOMINAL AORTIC ANEURYSM (AAA) WITHOUT RUPTURE (H): ICD-10-CM

## 2018-06-28 DIAGNOSIS — G89.18 ACUTE POST-OPERATIVE PAIN: Primary | ICD-10-CM

## 2018-06-28 LAB
ABO + RH BLD: NORMAL
ABO + RH BLD: NORMAL
BLD GP AB SCN SERPL QL: NORMAL
BLD PROD TYP BPU: NORMAL
BLD UNIT ID BPU: 0
BLD UNIT ID BPU: 0
BLOOD BANK CMNT PATIENT-IMP: NORMAL
BLOOD PRODUCT CODE: NORMAL
BLOOD PRODUCT CODE: NORMAL
BPU ID: NORMAL
BPU ID: NORMAL
CHOLEST SERPL-MCNC: 102 MG/DL
CREAT SERPL-MCNC: 0.82 MG/DL (ref 0.52–1.04)
GFR SERPL CREATININE-BSD FRML MDRD: 68 ML/MIN/1.7M2
GLUCOSE BLDC GLUCOMTR-MCNC: 176 MG/DL (ref 70–99)
GLUCOSE BLDC GLUCOMTR-MCNC: 180 MG/DL (ref 70–99)
HBA1C MFR BLD: 5.2 % (ref 0–5.6)
HDLC SERPL-MCNC: 59 MG/DL
LDLC SERPL CALC-MCNC: 19 MG/DL
NONHDLC SERPL-MCNC: 43 MG/DL
NUM BPU REQUESTED: 2
PLATELET # BLD AUTO: 323 10E9/L (ref 150–450)
POTASSIUM SERPL-SCNC: 3.8 MMOL/L (ref 3.4–5.3)
SPECIMEN EXP DATE BLD: NORMAL
TRANSFUSION STATUS PATIENT QL: NORMAL
TRIGL SERPL-MCNC: 122 MG/DL

## 2018-06-28 PROCEDURE — 00000146 ZZHCL STATISTIC GLUCOSE BY METER IP

## 2018-06-28 PROCEDURE — 20000003 ZZH R&B ICU

## 2018-06-28 PROCEDURE — 86901 BLOOD TYPING SEROLOGIC RH(D): CPT | Performed by: SURGERY

## 2018-06-28 PROCEDURE — 99223 1ST HOSP IP/OBS HIGH 75: CPT | Performed by: INTERNAL MEDICINE

## 2018-06-28 PROCEDURE — 71000014 ZZH RECOVERY PHASE 1 LEVEL 2 FIRST HR: Performed by: SURGERY

## 2018-06-28 PROCEDURE — 25000128 H RX IP 250 OP 636: Performed by: ANESTHESIOLOGY

## 2018-06-28 PROCEDURE — 04C00ZZ EXTIRPATION OF MATTER FROM ABDOMINAL AORTA, OPEN APPROACH: ICD-10-PCS | Performed by: SURGERY

## 2018-06-28 PROCEDURE — 86900 BLOOD TYPING SEROLOGIC ABO: CPT | Performed by: SURGERY

## 2018-06-28 PROCEDURE — 25000128 H RX IP 250 OP 636: Performed by: NURSE ANESTHETIST, CERTIFIED REGISTERED

## 2018-06-28 PROCEDURE — 71000015 ZZH RECOVERY PHASE 1 LEVEL 2 EA ADDTL HR: Performed by: SURGERY

## 2018-06-28 PROCEDURE — C1768 GRAFT, VASCULAR: HCPCS | Performed by: SURGERY

## 2018-06-28 PROCEDURE — 84132 ASSAY OF SERUM POTASSIUM: CPT | Performed by: SURGERY

## 2018-06-28 PROCEDURE — 25000128 H RX IP 250 OP 636: Performed by: SURGERY

## 2018-06-28 PROCEDURE — 40000169 ZZH STATISTIC PRE-PROCEDURE ASSESSMENT I: Performed by: SURGERY

## 2018-06-28 PROCEDURE — 35102 REPAIR DEFECT OF ARTERY: CPT | Mod: AS | Performed by: PHYSICIAN ASSISTANT

## 2018-06-28 PROCEDURE — 25000125 ZZHC RX 250: Performed by: PHYSICIAN ASSISTANT

## 2018-06-28 PROCEDURE — 36000063 ZZH SURGERY LEVEL 4 EA 15 ADDTL MIN: Performed by: SURGERY

## 2018-06-28 PROCEDURE — 37000008 ZZH ANESTHESIA TECHNICAL FEE, 1ST 30 MIN: Performed by: SURGERY

## 2018-06-28 PROCEDURE — 25000125 ZZHC RX 250: Performed by: SURGERY

## 2018-06-28 PROCEDURE — P9041 ALBUMIN (HUMAN),5%, 50ML: HCPCS | Performed by: NURSE ANESTHETIST, CERTIFIED REGISTERED

## 2018-06-28 PROCEDURE — 71046 X-RAY EXAM CHEST 2 VIEWS: CPT

## 2018-06-28 PROCEDURE — 35102 REPAIR DEFECT OF ARTERY: CPT | Performed by: SURGERY

## 2018-06-28 PROCEDURE — 27210794 ZZH OR GENERAL SUPPLY STERILE: Performed by: SURGERY

## 2018-06-28 PROCEDURE — 37000009 ZZH ANESTHESIA TECHNICAL FEE, EACH ADDTL 15 MIN: Performed by: SURGERY

## 2018-06-28 PROCEDURE — 85049 AUTOMATED PLATELET COUNT: CPT | Performed by: SURGERY

## 2018-06-28 PROCEDURE — 36415 COLL VENOUS BLD VENIPUNCTURE: CPT | Performed by: SURGERY

## 2018-06-28 PROCEDURE — 27211220 ZZ H OR ASSEMBLY BASIC A&A LINE 00208-00: Performed by: SURGERY

## 2018-06-28 PROCEDURE — 25000128 H RX IP 250 OP 636: Performed by: PHYSICIAN ASSISTANT

## 2018-06-28 PROCEDURE — 40000985 XR ABDOMEN PORT 1 VW

## 2018-06-28 PROCEDURE — 36000093 ZZH SURGERY LEVEL 4 1ST 30 MIN: Performed by: SURGERY

## 2018-06-28 PROCEDURE — 80061 LIPID PANEL: CPT | Performed by: SURGERY

## 2018-06-28 PROCEDURE — 82565 ASSAY OF CREATININE: CPT | Performed by: SURGERY

## 2018-06-28 PROCEDURE — P9016 RBC LEUKOCYTES REDUCED: HCPCS | Performed by: SURGERY

## 2018-06-28 PROCEDURE — 27211219 ZZ H OR RESEVOIR 3L 150 MICRON FILTER CELLSAVER HARDSHELL 00205-00: Performed by: SURGERY

## 2018-06-28 PROCEDURE — 04100JK BYPASS ABDOMINAL AORTA TO BILATERAL FEMORAL ARTERIES WITH SYNTHETIC SUBSTITUTE, OPEN APPROACH: ICD-10-PCS | Performed by: SURGERY

## 2018-06-28 PROCEDURE — 25000125 ZZHC RX 250: Performed by: NURSE ANESTHETIST, CERTIFIED REGISTERED

## 2018-06-28 PROCEDURE — 25000566 ZZH SEVOFLURANE, EA 15 MIN: Performed by: SURGERY

## 2018-06-28 PROCEDURE — 86923 COMPATIBILITY TEST ELECTRIC: CPT | Performed by: SURGERY

## 2018-06-28 PROCEDURE — 86891 AUTOLOGOUS BLOOD OP SALVAGE: CPT | Performed by: SURGERY

## 2018-06-28 PROCEDURE — 86850 RBC ANTIBODY SCREEN: CPT | Performed by: SURGERY

## 2018-06-28 PROCEDURE — 83036 HEMOGLOBIN GLYCOSYLATED A1C: CPT | Performed by: SURGERY

## 2018-06-28 PROCEDURE — 40000986 XR CHEST PORT 1 VW

## 2018-06-28 PROCEDURE — 27210995 ZZH RX 272: Performed by: SURGERY

## 2018-06-28 DEVICE — GRAFT HEMAGARD BIFURCATED 18X09MMX50CM HGK1809: Type: IMPLANTABLE DEVICE | Site: AORTA | Status: FUNCTIONAL

## 2018-06-28 RX ORDER — NALOXONE HYDROCHLORIDE 0.4 MG/ML
.1-.4 INJECTION, SOLUTION INTRAMUSCULAR; INTRAVENOUS; SUBCUTANEOUS
Status: DISCONTINUED | OUTPATIENT
Start: 2018-06-28 | End: 2018-07-04 | Stop reason: HOSPADM

## 2018-06-28 RX ORDER — ONDANSETRON 4 MG/1
4 TABLET, ORALLY DISINTEGRATING ORAL EVERY 30 MIN PRN
Status: DISCONTINUED | OUTPATIENT
Start: 2018-06-28 | End: 2018-06-28 | Stop reason: HOSPADM

## 2018-06-28 RX ORDER — ACETAMINOPHEN 325 MG/1
975 TABLET ORAL EVERY 8 HOURS
Status: DISPENSED | OUTPATIENT
Start: 2018-06-28 | End: 2018-07-01

## 2018-06-28 RX ORDER — ONDANSETRON 2 MG/ML
INJECTION INTRAMUSCULAR; INTRAVENOUS PRN
Status: DISCONTINUED | OUTPATIENT
Start: 2018-06-28 | End: 2018-06-28

## 2018-06-28 RX ORDER — FUROSEMIDE 10 MG/ML
20 INJECTION INTRAMUSCULAR; INTRAVENOUS
Status: DISCONTINUED | OUTPATIENT
Start: 2018-06-28 | End: 2018-07-04 | Stop reason: HOSPADM

## 2018-06-28 RX ORDER — SODIUM CHLORIDE, SODIUM LACTATE, POTASSIUM CHLORIDE, CALCIUM CHLORIDE 600; 310; 30; 20 MG/100ML; MG/100ML; MG/100ML; MG/100ML
INJECTION, SOLUTION INTRAVENOUS CONTINUOUS
Status: DISCONTINUED | OUTPATIENT
Start: 2018-06-28 | End: 2018-06-28 | Stop reason: HOSPADM

## 2018-06-28 RX ORDER — FUROSEMIDE 10 MG/ML
20 INJECTION INTRAMUSCULAR; INTRAVENOUS ONCE
Status: DISCONTINUED | OUTPATIENT
Start: 2018-06-28 | End: 2018-06-28

## 2018-06-28 RX ORDER — ONDANSETRON 4 MG/1
4 TABLET, ORALLY DISINTEGRATING ORAL EVERY 6 HOURS PRN
Status: DISCONTINUED | OUTPATIENT
Start: 2018-06-28 | End: 2018-07-04 | Stop reason: HOSPADM

## 2018-06-28 RX ORDER — LIDOCAINE 40 MG/G
CREAM TOPICAL
Status: DISCONTINUED | OUTPATIENT
Start: 2018-06-28 | End: 2018-07-01

## 2018-06-28 RX ORDER — ONDANSETRON 2 MG/ML
4 INJECTION INTRAMUSCULAR; INTRAVENOUS EVERY 6 HOURS PRN
Status: DISCONTINUED | OUTPATIENT
Start: 2018-06-28 | End: 2018-07-04 | Stop reason: HOSPADM

## 2018-06-28 RX ORDER — CLINDAMYCIN PHOSPHATE 900 MG/50ML
900 INJECTION, SOLUTION INTRAVENOUS EVERY 8 HOURS
Status: COMPLETED | OUTPATIENT
Start: 2018-06-28 | End: 2018-06-29

## 2018-06-28 RX ORDER — CLINDAMYCIN PHOSPHATE 900 MG/50ML
900 INJECTION, SOLUTION INTRAVENOUS
Status: COMPLETED | OUTPATIENT
Start: 2018-06-28 | End: 2018-06-28

## 2018-06-28 RX ORDER — DIPHENHYDRAMINE HYDROCHLORIDE 50 MG/ML
12.5 INJECTION INTRAMUSCULAR; INTRAVENOUS EVERY 6 HOURS PRN
Status: DISCONTINUED | OUTPATIENT
Start: 2018-06-28 | End: 2018-07-04 | Stop reason: HOSPADM

## 2018-06-28 RX ORDER — MAGNESIUM HYDROXIDE 1200 MG/15ML
LIQUID ORAL PRN
Status: DISCONTINUED | OUTPATIENT
Start: 2018-06-28 | End: 2018-06-28 | Stop reason: HOSPADM

## 2018-06-28 RX ORDER — FENTANYL CITRATE 50 UG/ML
50 INJECTION, SOLUTION INTRAMUSCULAR; INTRAVENOUS
Status: COMPLETED | OUTPATIENT
Start: 2018-06-28 | End: 2018-06-28

## 2018-06-28 RX ORDER — SODIUM CHLORIDE 9 MG/ML
INJECTION, SOLUTION INTRAVENOUS CONTINUOUS PRN
Status: DISCONTINUED | OUTPATIENT
Start: 2018-06-28 | End: 2018-06-28

## 2018-06-28 RX ORDER — PROCHLORPERAZINE MALEATE 5 MG
5 TABLET ORAL EVERY 6 HOURS PRN
Status: DISCONTINUED | OUTPATIENT
Start: 2018-06-28 | End: 2018-07-04 | Stop reason: HOSPADM

## 2018-06-28 RX ORDER — MEPERIDINE HYDROCHLORIDE 25 MG/ML
12.5 INJECTION INTRAMUSCULAR; INTRAVENOUS; SUBCUTANEOUS EVERY 5 MIN PRN
Status: DISCONTINUED | OUTPATIENT
Start: 2018-06-28 | End: 2018-06-28 | Stop reason: HOSPADM

## 2018-06-28 RX ORDER — ACETAMINOPHEN 325 MG/1
650 TABLET ORAL EVERY 4 HOURS PRN
Status: DISCONTINUED | OUTPATIENT
Start: 2018-07-01 | End: 2018-07-04 | Stop reason: HOSPADM

## 2018-06-28 RX ORDER — EPHEDRINE SULFATE 50 MG/ML
INJECTION, SOLUTION INTRAMUSCULAR; INTRAVENOUS; SUBCUTANEOUS PRN
Status: DISCONTINUED | OUTPATIENT
Start: 2018-06-28 | End: 2018-06-28

## 2018-06-28 RX ORDER — ALBUMIN, HUMAN INJ 5% 5 %
SOLUTION INTRAVENOUS CONTINUOUS PRN
Status: DISCONTINUED | OUTPATIENT
Start: 2018-06-28 | End: 2018-06-28

## 2018-06-28 RX ORDER — HYDROMORPHONE HYDROCHLORIDE 1 MG/ML
.3-.5 INJECTION, SOLUTION INTRAMUSCULAR; INTRAVENOUS; SUBCUTANEOUS EVERY 5 MIN PRN
Status: DISCONTINUED | OUTPATIENT
Start: 2018-06-28 | End: 2018-06-28 | Stop reason: HOSPADM

## 2018-06-28 RX ORDER — VECURONIUM BROMIDE 1 MG/ML
INJECTION, POWDER, LYOPHILIZED, FOR SOLUTION INTRAVENOUS PRN
Status: DISCONTINUED | OUTPATIENT
Start: 2018-06-28 | End: 2018-06-28

## 2018-06-28 RX ORDER — DEXTROSE MONOHYDRATE 25 G/50ML
25-50 INJECTION, SOLUTION INTRAVENOUS
Status: DISCONTINUED | OUTPATIENT
Start: 2018-06-28 | End: 2018-07-04 | Stop reason: HOSPADM

## 2018-06-28 RX ORDER — OXYCODONE HYDROCHLORIDE 5 MG/1
5-10 TABLET ORAL EVERY 4 HOURS PRN
Status: DISCONTINUED | OUTPATIENT
Start: 2018-06-28 | End: 2018-07-04 | Stop reason: HOSPADM

## 2018-06-28 RX ORDER — NICOTINE POLACRILEX 4 MG
15-30 LOZENGE BUCCAL
Status: DISCONTINUED | OUTPATIENT
Start: 2018-06-28 | End: 2018-07-04 | Stop reason: HOSPADM

## 2018-06-28 RX ORDER — ALUMINA, MAGNESIA, AND SIMETHICONE 2400; 2400; 240 MG/30ML; MG/30ML; MG/30ML
30 SUSPENSION ORAL EVERY 4 HOURS PRN
Status: DISCONTINUED | OUTPATIENT
Start: 2018-06-28 | End: 2018-07-04 | Stop reason: HOSPADM

## 2018-06-28 RX ORDER — METOPROLOL TARTRATE 1 MG/ML
5 INJECTION, SOLUTION INTRAVENOUS EVERY 6 HOURS
Status: DISCONTINUED | OUTPATIENT
Start: 2018-06-29 | End: 2018-07-04 | Stop reason: HOSPADM

## 2018-06-28 RX ORDER — NALOXONE HYDROCHLORIDE 0.4 MG/ML
.1-.4 INJECTION, SOLUTION INTRAMUSCULAR; INTRAVENOUS; SUBCUTANEOUS
Status: DISCONTINUED | OUTPATIENT
Start: 2018-06-28 | End: 2018-06-28

## 2018-06-28 RX ORDER — ONDANSETRON 2 MG/ML
4 INJECTION INTRAMUSCULAR; INTRAVENOUS EVERY 30 MIN PRN
Status: DISCONTINUED | OUTPATIENT
Start: 2018-06-28 | End: 2018-06-28 | Stop reason: HOSPADM

## 2018-06-28 RX ORDER — LIDOCAINE HYDROCHLORIDE 20 MG/ML
INJECTION, SOLUTION INFILTRATION; PERINEURAL PRN
Status: DISCONTINUED | OUTPATIENT
Start: 2018-06-28 | End: 2018-06-28

## 2018-06-28 RX ORDER — SODIUM CHLORIDE 9 MG/ML
INJECTION, SOLUTION INTRAVENOUS CONTINUOUS
Status: DISCONTINUED | OUTPATIENT
Start: 2018-06-28 | End: 2018-07-01

## 2018-06-28 RX ORDER — FENTANYL CITRATE 50 UG/ML
25-50 INJECTION, SOLUTION INTRAMUSCULAR; INTRAVENOUS
Status: DISCONTINUED | OUTPATIENT
Start: 2018-06-28 | End: 2018-06-28 | Stop reason: HOSPADM

## 2018-06-28 RX ORDER — FUROSEMIDE 10 MG/ML
20 INJECTION INTRAMUSCULAR; INTRAVENOUS
Status: DISPENSED | OUTPATIENT
Start: 2018-06-28 | End: 2018-06-28

## 2018-06-28 RX ORDER — PROPOFOL 10 MG/ML
INJECTION, EMULSION INTRAVENOUS PRN
Status: DISCONTINUED | OUTPATIENT
Start: 2018-06-28 | End: 2018-06-28

## 2018-06-28 RX ORDER — FENTANYL CITRATE 50 UG/ML
INJECTION, SOLUTION INTRAMUSCULAR; INTRAVENOUS PRN
Status: DISCONTINUED | OUTPATIENT
Start: 2018-06-28 | End: 2018-06-28

## 2018-06-28 RX ORDER — DIPHENHYDRAMINE HCL 12.5MG/5ML
12.5 LIQUID (ML) ORAL EVERY 6 HOURS PRN
Status: DISCONTINUED | OUTPATIENT
Start: 2018-06-28 | End: 2018-07-04 | Stop reason: HOSPADM

## 2018-06-28 RX ORDER — HEPARIN SODIUM 1000 [USP'U]/ML
INJECTION, SOLUTION INTRAVENOUS; SUBCUTANEOUS PRN
Status: DISCONTINUED | OUTPATIENT
Start: 2018-06-28 | End: 2018-06-28

## 2018-06-28 RX ORDER — ASPIRIN 325 MG
325 TABLET ORAL DAILY
Status: DISCONTINUED | OUTPATIENT
Start: 2018-06-28 | End: 2018-06-29 | Stop reason: ALTCHOICE

## 2018-06-28 RX ORDER — KETOROLAC TROMETHAMINE 15 MG/ML
15 INJECTION, SOLUTION INTRAMUSCULAR; INTRAVENOUS EVERY 6 HOURS
Status: DISCONTINUED | OUTPATIENT
Start: 2018-06-28 | End: 2018-06-29

## 2018-06-28 RX ORDER — FENTANYL CITRATE 50 UG/ML
100 INJECTION, SOLUTION INTRAMUSCULAR; INTRAVENOUS ONCE
Status: COMPLETED | OUTPATIENT
Start: 2018-06-28 | End: 2018-06-28

## 2018-06-28 RX ORDER — FUROSEMIDE 10 MG/ML
INJECTION INTRAMUSCULAR; INTRAVENOUS PRN
Status: DISCONTINUED | OUTPATIENT
Start: 2018-06-28 | End: 2018-06-28

## 2018-06-28 RX ORDER — DEXAMETHASONE SODIUM PHOSPHATE 4 MG/ML
INJECTION, SOLUTION INTRA-ARTICULAR; INTRALESIONAL; INTRAMUSCULAR; INTRAVENOUS; SOFT TISSUE PRN
Status: DISCONTINUED | OUTPATIENT
Start: 2018-06-28 | End: 2018-06-28

## 2018-06-28 RX ADMIN — Medication 0.5 MG: at 17:50

## 2018-06-28 RX ADMIN — Medication 5 MG: at 14:02

## 2018-06-28 RX ADMIN — VECURONIUM BROMIDE 4 MG: 1 INJECTION, POWDER, LYOPHILIZED, FOR SOLUTION INTRAVENOUS at 13:31

## 2018-06-28 RX ADMIN — PHENYLEPHRINE HYDROCHLORIDE 50 MCG: 10 INJECTION, SOLUTION INTRAMUSCULAR; INTRAVENOUS; SUBCUTANEOUS at 14:02

## 2018-06-28 RX ADMIN — FUROSEMIDE 5 MG: 10 INJECTION, SOLUTION INTRAVENOUS at 16:23

## 2018-06-28 RX ADMIN — Medication 5 MG: at 13:56

## 2018-06-28 RX ADMIN — Medication 0.5 MG: at 18:16

## 2018-06-28 RX ADMIN — SODIUM CHLORIDE, POTASSIUM CHLORIDE, SODIUM LACTATE AND CALCIUM CHLORIDE: 600; 310; 30; 20 INJECTION, SOLUTION INTRAVENOUS at 16:40

## 2018-06-28 RX ADMIN — FENTANYL CITRATE 50 MCG: 50 INJECTION, SOLUTION INTRAMUSCULAR; INTRAVENOUS at 13:48

## 2018-06-28 RX ADMIN — FENTANYL CITRATE 25 MCG: 50 INJECTION, SOLUTION INTRAMUSCULAR; INTRAVENOUS at 18:12

## 2018-06-28 RX ADMIN — FENTANYL CITRATE 100 MCG: 50 INJECTION INTRAMUSCULAR; INTRAVENOUS at 12:18

## 2018-06-28 RX ADMIN — HYDROMORPHONE HYDROCHLORIDE 0.3 MG: 1 INJECTION, SOLUTION INTRAMUSCULAR; INTRAVENOUS; SUBCUTANEOUS at 15:08

## 2018-06-28 RX ADMIN — Medication 5 MG: at 13:16

## 2018-06-28 RX ADMIN — PHENYLEPHRINE HYDROCHLORIDE 100 MCG: 10 INJECTION, SOLUTION INTRAMUSCULAR; INTRAVENOUS; SUBCUTANEOUS at 14:08

## 2018-06-28 RX ADMIN — HYDROMORPHONE HYDROCHLORIDE 0.2 MG: 1 INJECTION, SOLUTION INTRAMUSCULAR; INTRAVENOUS; SUBCUTANEOUS at 15:25

## 2018-06-28 RX ADMIN — PHENYLEPHRINE HYDROCHLORIDE 100 MCG: 10 INJECTION, SOLUTION INTRAMUSCULAR; INTRAVENOUS; SUBCUTANEOUS at 13:05

## 2018-06-28 RX ADMIN — PHENYLEPHRINE HYDROCHLORIDE 50 MCG: 10 INJECTION, SOLUTION INTRAMUSCULAR; INTRAVENOUS; SUBCUTANEOUS at 13:57

## 2018-06-28 RX ADMIN — FENTANYL CITRATE 50 MCG: 50 INJECTION, SOLUTION INTRAMUSCULAR; INTRAVENOUS at 17:21

## 2018-06-28 RX ADMIN — FENTANYL CITRATE 200 MCG: 50 INJECTION, SOLUTION INTRAMUSCULAR; INTRAVENOUS at 13:01

## 2018-06-28 RX ADMIN — Medication 5 MG: at 13:08

## 2018-06-28 RX ADMIN — SODIUM CHLORIDE: 9 INJECTION, SOLUTION INTRAVENOUS at 12:58

## 2018-06-28 RX ADMIN — PHENYLEPHRINE HYDROCHLORIDE 100 MCG: 10 INJECTION, SOLUTION INTRAMUSCULAR; INTRAVENOUS; SUBCUTANEOUS at 13:09

## 2018-06-28 RX ADMIN — SODIUM CHLORIDE 1.5 UNITS/HR: 9 INJECTION, SOLUTION INTRAVENOUS at 22:34

## 2018-06-28 RX ADMIN — MIDAZOLAM HYDROCHLORIDE 1 MG: 1 INJECTION, SOLUTION INTRAMUSCULAR; INTRAVENOUS at 11:57

## 2018-06-28 RX ADMIN — ALBUMIN (HUMAN): 12.5 SOLUTION INTRAVENOUS at 16:17

## 2018-06-28 RX ADMIN — SODIUM CHLORIDE, POTASSIUM CHLORIDE, SODIUM LACTATE AND CALCIUM CHLORIDE: 600; 310; 30; 20 INJECTION, SOLUTION INTRAVENOUS at 12:10

## 2018-06-28 RX ADMIN — CLINDAMYCIN PHOSPHATE 900 MG: 900 INJECTION, SOLUTION INTRAVENOUS at 21:10

## 2018-06-28 RX ADMIN — CLINDAMYCIN PHOSPHATE 900 MG: 18 INJECTION, SOLUTION INTRAVENOUS at 13:11

## 2018-06-28 RX ADMIN — MIDAZOLAM HYDROCHLORIDE 2 MG: 1 INJECTION, SOLUTION INTRAMUSCULAR; INTRAVENOUS at 12:18

## 2018-06-28 RX ADMIN — ONDANSETRON 4 MG: 2 INJECTION INTRAMUSCULAR; INTRAVENOUS at 21:07

## 2018-06-28 RX ADMIN — LIDOCAINE HYDROCHLORIDE 100 MG: 20 INJECTION, SOLUTION INFILTRATION; PERINEURAL at 13:02

## 2018-06-28 RX ADMIN — FENTANYL CITRATE 25 MCG: 50 INJECTION, SOLUTION INTRAMUSCULAR; INTRAVENOUS at 18:03

## 2018-06-28 RX ADMIN — HYDROMORPHONE HYDROCHLORIDE: 10 INJECTION, SOLUTION INTRAMUSCULAR; INTRAVENOUS; SUBCUTANEOUS at 18:17

## 2018-06-28 RX ADMIN — MEPERIDINE HYDROCHLORIDE 12.5 MG: 25 INJECTION INTRAMUSCULAR; INTRAVENOUS; SUBCUTANEOUS at 18:41

## 2018-06-28 RX ADMIN — Medication 5 MG: at 13:07

## 2018-06-28 RX ADMIN — PROPOFOL 150 MG: 10 INJECTION, EMULSION INTRAVENOUS at 13:02

## 2018-06-28 RX ADMIN — ONDANSETRON 4 MG: 2 INJECTION INTRAMUSCULAR; INTRAVENOUS at 16:30

## 2018-06-28 RX ADMIN — PHENYLEPHRINE HYDROCHLORIDE 100 MCG: 10 INJECTION, SOLUTION INTRAMUSCULAR; INTRAVENOUS; SUBCUTANEOUS at 13:07

## 2018-06-28 RX ADMIN — SODIUM CHLORIDE, POTASSIUM CHLORIDE, SODIUM LACTATE AND CALCIUM CHLORIDE: 600; 310; 30; 20 INJECTION, SOLUTION INTRAVENOUS at 14:45

## 2018-06-28 RX ADMIN — PROCHLORPERAZINE EDISYLATE 5 MG: 5 INJECTION INTRAMUSCULAR; INTRAVENOUS at 22:55

## 2018-06-28 RX ADMIN — KETOROLAC TROMETHAMINE 15 MG: 15 INJECTION, SOLUTION INTRAMUSCULAR; INTRAVENOUS at 19:27

## 2018-06-28 RX ADMIN — FENTANYL CITRATE 25 MCG: 50 INJECTION, SOLUTION INTRAMUSCULAR; INTRAVENOUS at 16:46

## 2018-06-28 RX ADMIN — FENTANYL CITRATE 50 MCG: 50 INJECTION INTRAMUSCULAR; INTRAVENOUS at 11:58

## 2018-06-28 RX ADMIN — PROPOFOL 30 MG: 10 INJECTION, EMULSION INTRAVENOUS at 13:51

## 2018-06-28 RX ADMIN — SODIUM CHLORIDE: 9 INJECTION, SOLUTION INTRAVENOUS at 16:21

## 2018-06-28 RX ADMIN — DEXAMETHASONE SODIUM PHOSPHATE 4 MG: 4 INJECTION, SOLUTION INTRA-ARTICULAR; INTRALESIONAL; INTRAMUSCULAR; INTRAVENOUS; SOFT TISSUE at 13:18

## 2018-06-28 RX ADMIN — ROCURONIUM BROMIDE 50 MG: 10 INJECTION INTRAVENOUS at 13:02

## 2018-06-28 RX ADMIN — PHENYLEPHRINE HYDROCHLORIDE 0.3 MCG/KG/MIN: 10 INJECTION, SOLUTION INTRAMUSCULAR; INTRAVENOUS; SUBCUTANEOUS at 13:05

## 2018-06-28 RX ADMIN — Medication 0.5 MG: at 17:35

## 2018-06-28 RX ADMIN — PHENYLEPHRINE HYDROCHLORIDE 50 MCG: 10 INJECTION, SOLUTION INTRAMUSCULAR; INTRAVENOUS; SUBCUTANEOUS at 16:33

## 2018-06-28 RX ADMIN — PHENYLEPHRINE HYDROCHLORIDE 80 MCG: 10 INJECTION, SOLUTION INTRAMUSCULAR; INTRAVENOUS; SUBCUTANEOUS at 15:59

## 2018-06-28 RX ADMIN — FENTANYL CITRATE 25 MCG: 50 INJECTION, SOLUTION INTRAMUSCULAR; INTRAVENOUS at 16:50

## 2018-06-28 RX ADMIN — ONDANSETRON 4 MG: 2 INJECTION INTRAMUSCULAR; INTRAVENOUS at 17:46

## 2018-06-28 RX ADMIN — SODIUM CHLORIDE: 9 INJECTION, SOLUTION INTRAVENOUS at 21:01

## 2018-06-28 RX ADMIN — HEPARIN SODIUM 3000 UNITS: 1000 INJECTION, SOLUTION INTRAVENOUS; SUBCUTANEOUS at 14:31

## 2018-06-28 RX ADMIN — VECURONIUM BROMIDE 1 MG: 1 INJECTION, POWDER, LYOPHILIZED, FOR SOLUTION INTRAVENOUS at 15:43

## 2018-06-28 NOTE — ANESTHESIA PREPROCEDURE EVALUATION
Procedure: Procedure(s):  BYPASS GRAFT AORTOFEMORAL  Preop diagnosis: abdominal aortic aneurysm, iliac occlusive diease     Allergies   Allergen Reactions     Penicillins Hives     Past Medical History:   Diagnosis Date     Aortic aneurysm (H)      Environmental allergies 6/14/2016     Essential hypertension with goal blood pressure less than 140/90 6/14/2016     Hip pain, left      Osteopenia      Past Surgical History:   Procedure Laterality Date     CATARACT IOL, RT/LT       HYSTERECTOMY TOTAL ABDOMINAL  1966     MAMMOPLASTY AUGMENTATION       VAGOTOMY, PYLOROPLASTY, COMBINED       Social History   Substance Use Topics     Smoking status: Former Smoker     Smokeless tobacco: Never Used     Alcohol use 0.0 oz/week     0 Standard drinks or equivalent per week      Comment: rare     Prior to Admission medications    Medication Sig Start Date End Date Taking? Authorizing Provider   ASPIRIN EC PO Take 81 mg by mouth daily    Reported, Patient   atorvastatin (LIPITOR) 20 MG tablet Take 1 tablet (20 mg) by mouth daily 5/11/18   Darline Childs MD   Calcium Carb-Cholecalciferol (CALCIUM 500 +D) 500-400 MG-UNIT TABS Take 1 tablet by mouth 2 times daily 7/31/17   Jermain Arcos MD   gabapentin (NEURONTIN) 300 MG capsule Take 1 capsule (300 mg) by mouth 3 times daily 5/11/18   Darline Childs MD   HYDROcodone-acetaminophen (NORCO) 5-325 MG per tablet Take 1 tablet by mouth every 6 hours as needed for severe pain 6/11/18   Darline Childs MD   lisinopril (PRINIVIL/ZESTRIL) 20 MG tablet Take 1 tablet (20 mg) by mouth daily 6/14/18   Darline Childs MD   oxyCODONE-acetaminophen (PERCOCET) 5-325 MG per tablet Take 1 tablet by mouth every 6 hours as needed for severe pain 6/18/18   Darline Childs MD     No current Epic-ordered facility-administered medications on file.      Current Outpatient Prescriptions Ordered in Epic   Medication     ASPIRIN EC PO     atorvastatin (LIPITOR) 20 MG tablet      Calcium Carb-Cholecalciferol (CALCIUM 500 +D) 500-400 MG-UNIT TABS     gabapentin (NEURONTIN) 300 MG capsule     HYDROcodone-acetaminophen (NORCO) 5-325 MG per tablet     lisinopril (PRINIVIL/ZESTRIL) 20 MG tablet     oxyCODONE-acetaminophen (PERCOCET) 5-325 MG per tablet       Wt Readings from Last 1 Encounters:   06/13/18 63 kg (139 lb)     Temp Readings from Last 1 Encounters:   06/13/18 36.8  C (98.2  F) (Tympanic)     BP Readings from Last 6 Encounters:   06/13/18 110/68   06/12/18 117/59   06/11/18 148/72   05/30/18 110/60   05/11/18 122/68   05/04/18 122/72     Pulse Readings from Last 4 Encounters:   06/13/18 78   06/12/18 68   06/11/18 76   05/30/18 64     Resp Readings from Last 1 Encounters:   05/01/18 16     SpO2 Readings from Last 1 Encounters:   06/13/18 99%     Recent Labs   Lab Test  06/22/18   1028  06/15/18   1012   NA  133  126*   POTASSIUM  4.7  3.7   CHLORIDE  101  92*   CO2  23  23   ANIONGAP  9  11   GLC  101*  106*   BUN  10  15   CR  0.81  0.96   SHAHAB  8.8  9.1     Recent Labs   Lab Test  07/31/17   1116  06/14/16   1006   AST  13  13   ALT  13  17   ALKPHOS  98  119   BILITOTAL  0.3  0.3     Recent Labs   Lab Test  06/22/18   1028  06/15/18   1012   WBC  5.4  5.9   HGB  8.5*  8.7*   PLT  403  373     No results for input(s): ABO, RH in the last 49230 hours.  No results for input(s): INR, PTT in the last 88813 hours.   No results for input(s): TROPI in the last 91664 hours.  No results for input(s): PH, PCO2, PO2, HCO3 in the last 62235 hours.  No results for input(s): HCG in the last 13107 hours.  Recent Results (from the past 744 hour(s))   US JODIE Doppler No Exercise    Narrative    ULTRASOUND ANKLE-BRACHIAL INDEX DOPPLER WITHOUT EXERCISE, ONE TO TWO  LEVELS, BILATERAL   6/12/2018 9:33 AM     HISTORY: History of PAD (peripheral artery disease) (H).    COMPARISON: CTA 5/1/2018    FINDINGS:  Right JODIE: 0.16.  Left JODIE: 0.42.    Waveforms: Monophasic on the right biphasic on the  left.    Exercise: Patient did not feel comfortable exercising today due to  unsteadiness on her feet.      Impression    IMPRESSION: Bilateral ABIs show severe arterial insufficiency.    JODIE CRITERIA:  >0.95 Normal  0.90 - 0.94 Mild  0.5 - 0.89 Moderate  0.2 - 0.49 Severe  <0.2 Critical    ANDREW BURTON DO       RECENT LABS:   ECG:   ECHO:     Anesthesia Evaluation     .             ROS/MED HX    ENT/Pulmonary:       Neurologic:       Cardiovascular:     (+) hypertension-Peripheral Vascular Disease-- Other, --. : . . . :. .       METS/Exercise Tolerance:     Hematologic:         Musculoskeletal:         GI/Hepatic:         Renal/Genitourinary:         Endo:         Psychiatric:         Infectious Disease:         Malignancy:         Other:                     Physical Exam  Normal systems: cardiovascular and pulmonary    Airway   Mallampati: I  Neck ROM: full    Dental   (+) missing, lower dentures and upper dentures    Cardiovascular       Pulmonary (+) decreased breath sounds                       Anesthesia Plan      History & Physical Review  History and physical reviewed and following examination; no interval change.    ASA Status:  3 .    NPO Status:  > 8 hours    Plan for General and ETT with Intravenous induction. Maintenance will be Balanced.    PONV prophylaxis:  Ondansetron (or other 5HT-3)  Additional equipment: Arterial Line and Central Line      Postoperative Care  Postoperative pain management:  IV analgesics.      Consents  Anesthetic plan, risks, benefits and alternatives discussed with:  Patient..                          .

## 2018-06-28 NOTE — IP AVS SNAPSHOT
MRN:[id number]                      After Visit Summary   6/28/2018    Trish Wu    MRN: [id number]           Thank you!     Thank you for choosing [facility] for your care. Our goal is always to provide you with excellent care. Hearing back from our patients is one way we can continue to improve our services. Please take a few minutes to complete the written survey that you may receive in the mail after you visit with us. Thank you!        Patient Information     Date Of Birth          1945        Designated Caregiver       Most Recent Value    Caregiver    Will someone help with your care after discharge? yes    Name of designated caregiver Beth    Phone number of caregiver     Caregiver address [place]      About your hospital stay     You were admitted on:  June 28, 2018 You last received care in the:  [facility] [facility] [address] [facility]    You were discharged on:  July 4, 2018        Reason for your hospital stay       Aortobifem Bypass surgery                  Who to Call     For medical emergencies, please call 911.  For non-urgent questions about your medical care, please call your primary care provider or clinic, [phone]  For questions related to your surgery, please call your surgery clinic        Attending Provider     Provider Ulisses Parikh MD Surgery       Primary Care Provider Office Phone # Fax #    Darline Childs [phone]      After Care Instructions     Activity       Your activity upon discharge: activity as tolerated, ambulate in house and no lifting, driving, or strenuous exercise for 4-6 weeks            Diet       Follow this diet upon discharge: Advance to a regular diet as tolerated            Discharge Instructions       1. Please take lisinopril same dose as before.    2. Take iron pills. one pill twice a day    3. Pantoprazole 40 mg twice a day    4. Follow up with primary care MD in a  "week and get BMP, CBC blood test .    5. Follow up with  at vascular center.            Wound care and dressings       Instructions to care for your wound at home: keep wound clean and dry and may get incision wet in shower but do not soak or scrub.                  Follow-up Appointments     Follow-up and recommended labs and tests        Follow up with Dr. Meraz at [facility], within 7-10 days  to evaluate after surgery/remove staples. No follow up labs or test are needed.                  Additional Services     Home Care PT Referral for Hospital Discharge       PT to eval and treat    Your provider has ordered home care - physical therapy. If you have not been contacted within 2 days of your discharge please call the department phone number listed on the top of this document.            Home care nursing referral       RN skilled nursing visit. RN to assess pain level and activity tolerance and assess for signs and symptoms.    Your provider has ordered home care nursing services. If you have not been contacted within 2 days of your discharge please call the inpatient department phone number at [phone] .                  Pending Results     No orders found from 6/26/2018 to 6/29/2018.            Statement of Approval     Ordered          07/04/18 0804  I have reviewed and agree with all the recommendations and orders detailed in this document.  EFFECTIVE NOW     Approved and electronically signed by:  Elio Flores MD             Admission Information     Date & Time Provider Department Dept. Phone    6/28/2018 Ulisses Meraz MD [place] [address] [facility] [phone]      Your Vitals Were     Blood Pressure Pulse Temperature Respirations Height Weight    140/67 (BP Location: Right arm) 78 98.2  F (36.8  C) (Oral) 16 1.676 m (5' 6\") 66.2 kg (145 lb 15.1 oz)    Pulse Oximetry BMI (Body Mass Index)                99% 23.56 kg/m2          MyChart " Information     [employer] gives you secure access to your electronic health record. If you see a primary care provider, you can also send messages to your care team and make appointments. If you have questions, please call your primary care clinic.  If you do not have a primary care provider, please call [phone] and they will assist you.        Care EveryWhere ID     This is your Care EveryWhere ID. This could be used by other organizations to access your [facility] medical records  [id number]        Equal Access to Services     JESSICA FLORENCE : Hadii ludmila ku hadasho Soomaali, waaxda luqadaha, qaybta kaalmada adeegyada, lizeth huber. So [facility] [phone].    ATENCIÓN: Si habla español, tiene a valencia disposición servicios gratuitos de asistencia lingüística. [facility] [phone].    We comply with applicable federal civil rights laws and [place] laws. We do not discriminate on the basis of race, color, national origin, age, disability, sex, sexual orientation, or gender identity.               Review of your medicines      START taking        Dose / Directions    order for DME   Used for:  Abdominal aortic aneurysm (AAA) without rupture (H), Acute post-operative pain        Equipment being ordered: Walker () Treatment Diagnosis: Abdominal Aortic Aneurysm Repair   Quantity:  1 Units   Refills:  0       oxyCODONE IR 5 MG tablet   Commonly known as:  ROXICODONE   Used for:  Acute post-operative pain        Dose:  5-10 mg   Take 1-2 tablets (5-10 mg) by mouth every 4 hours as needed for other (pain control or improvement in physical function. Hold dose for analgesic side effects.)   Quantity:  30 tablet   Refills:  0       pantoprazole 40 MG EC tablet   Commonly known as:  PROTONIX   Used for:  Gastroesophageal reflux disease with esophagitis        Dose:  40 mg   Start taking on:  7/5/2018   Take 1 tablet (40 mg) by mouth every morning   Quantity:  60 tablet   Refills:  1        senna-docusate 8.6-50 MG per tablet   Commonly known as:  EQ SENNA-S   Used for:  Constipation, unspecified constipation type        Dose:  1 tablet   Take 1 tablet by mouth daily   Quantity:  100 tablet   Refills:  1         CONTINUE these medicines which may have CHANGED, or have new prescriptions. If we are uncertain of the size of tablets/capsules you have at home, strength may be listed as something that might have changed.        Dose / Directions    atorvastatin 20 MG tablet   Commonly known as:  LIPITOR   This may have changed:  when to take this   Used for:  PAD (peripheral artery disease) (H)        Dose:  20 mg   Take 1 tablet (20 mg) by mouth daily   Quantity:  90 tablet   Refills:  3         CONTINUE these medicines which have NOT CHANGED        Dose / Directions    ASPIRIN EC PO        Dose:  81 mg   Take 81 mg by mouth daily   Refills:  0       Calcium Carb-Cholecalciferol 500-400 MG-UNIT Tabs   Commonly known as:  calcium 500 +D   Used for:  Osteopenia, unspecified location        Dose:  1 tablet   Take 1 tablet by mouth 2 times daily   Refills:  0       gabapentin 300 MG capsule   Commonly known as:  NEURONTIN   Used for:  Numbness of right foot        Dose:  300 mg   Take 1 capsule (300 mg) by mouth 3 times daily   Quantity:  90 capsule   Refills:  3       IRON SUPPLEMENT PO        Dose:  1 tablet   Take 1 tablet by mouth 2 times daily   Refills:  0         STOP taking     lisinopril 20 MG tablet   Commonly known as:  PRINIVIL/ZESTRIL           oxyCODONE-acetaminophen 5-325 MG per tablet   Commonly known as:  PERCOCET                Where to get your medicines      These medications were sent to [facility] - [address]  [address] [place] [address]     Phone:  [phone]     pantoprazole 40 MG EC tablet    senna-docusate 8.6-50 MG per tablet         Some of these will need a paper prescription and others can be bought over the counter. Ask your  nurse if you have questions.     Bring a paper prescription for each of these medications     order for DME    oxyCODONE IR 5 MG tablet                Protect others around you: Learn how to safely use, store and throw away your medicines at www.disposemymeds.org.        Information about OPIOIDS     PRESCRIPTION OPIOIDS: WHAT YOU NEED TO KNOW   We gave you an opioid (narcotic) pain medicine. It is important to manage your pain, but opioids are not always the best choice. You should first try all the other options your care team gave you. Take this medicine for as short a time (and as few doses) as possible.     These medicines have risks:    DO NOT drive when on new or higher doses of pain medicine. These medicines can affect your alertness and reaction times, and you could be arrested for driving under the influence (DUI). If you need to use opioids long-term, talk to your care team about driving.    DO NOT operate heave machinery    DO NOT do any other dangerous activities while taking these medicines.     DO NOT drink any alcohol while taking these medicines.      If the opioid prescribed includes acetaminophen, DO NOT take with any other medicines that contain acetaminophen. Read all labels carefully. Look for the word  acetaminophen  or  Tylenol.  Ask your pharmacist if you have questions or are unsure.    You can get addicted to pain medicines, especially if you have a history of addiction (chemical, alcohol or substance dependence). Talk to your care team about ways to reduce this risk.    Store your pills in a secure place, locked if possible. We will not replace any lost or stolen medicine. If you don t finish your medicine, please throw away (dispose) as directed by your pharmacist. The [place] Pollution Control Agency has more information about safe disposal: https://www.pca.state.[place]/living-green/managing-unwanted-medications.     All opioids tend to cause constipation. Drink plenty of water and eat  foods that have a lot of fiber, such as fruits, vegetables, prune juice, apple juice and high-fiber cereal. Take a laxative (Miralax, milk of magnesia, Colace, Senna) if you don t move your bowels at least every other day.              Medication List: This is a list of all your medications and when to take them. Check marks below indicate your daily home schedule. Keep this list as a reference.      Medications           Morning Afternoon Evening Bedtime As Needed    ASPIRIN EC PO   Take 81 mg by mouth daily                                atorvastatin 20 MG tablet   Commonly known as:  LIPITOR   Take 1 tablet (20 mg) by mouth daily   Last time this was given:  20 mg on 7/3/2018  8:59 PM                                Calcium Carb-Cholecalciferol 500-400 MG-UNIT Tabs   Commonly known as:  calcium 500 +D   Take 1 tablet by mouth 2 times daily                                gabapentin 300 MG capsule   Commonly known as:  NEURONTIN   Take 1 capsule (300 mg) by mouth 3 times daily                                IRON SUPPLEMENT PO   Take 1 tablet by mouth 2 times daily                                order for DME   Equipment being ordered: Walker () Treatment Diagnosis: Abdominal Aortic Aneurysm Repair                                oxyCODONE IR 5 MG tablet   Commonly known as:  ROXICODONE   Take 1-2 tablets (5-10 mg) by mouth every 4 hours as needed for other (pain control or improvement in physical function. Hold dose for analgesic side effects.)   Last time this was given:  10 mg on 7/4/2018 12:56 PM                                pantoprazole 40 MG EC tablet   Commonly known as:  PROTONIX   Take 1 tablet (40 mg) by mouth every morning   Start taking on:  7/5/2018   Last time this was given:  40 mg on 7/4/2018  8:23 AM                                senna-docusate 8.6-50 MG per tablet   Commonly known as:  EQ SENNA-S   Take 1 tablet by mouth daily

## 2018-06-28 NOTE — PROGRESS NOTES
Central line appears to be in acceptable position at the junction of the SVC/right atrium on chest x-ray in PACU.  Iain Wei MD

## 2018-06-28 NOTE — ANESTHESIA CARE TRANSFER NOTE
Patient: Trish Wu    Procedure(s):  AORTOFEMORAL BYPASS - Wound Class: I-Clean    Diagnosis: abdominal aortic aneurysm, iliac occlusive diease   Diagnosis Additional Information: No value filed.    Anesthesia Type:   General, ETT     Note:  Airway :Face Mask  Patient transferred to:PACU  Handoff Report: Identifed the Patient, Identified the Reponsible Provider, Reviewed the pertinent medical history, Discussed the surgical course, Reviewed Intra-OP anesthesia mangement and issues during anesthesia, Set expectations for post-procedure period and Allowed opportunity for questions and acknowledgement of understanding      Vitals: (Last set prior to Anesthesia Care Transfer)    CRNA VITALS  6/28/2018 1648 - 6/28/2018 1727      6/28/2018             EKG: Sinus rhythm;PVC's                Electronically Signed By: VALERIANO Elizondo CRNA  June 28, 2018  5:27 PM

## 2018-06-28 NOTE — PROGRESS NOTES
Admission medication history interview status for the 6/28/2018  admission is complete. See EPIC admission navigator for prior to admission medications     Medication history source reliability:Moderate    Medication history interview source(s):Patient    Medication history resources (including written lists, pill bottles, clinic record):Patient mailed in her medication list prior to surgery.  Also verified medications with [facility]    Primary pharmacy.[facility]    Additional medication history information not noted on PTA med list :None    Time spent in this activity: 50 minutes    Prior to Admission medications    Medication Sig Last Dose Taking? Auth Provider   ASPIRIN EC PO Take 81 mg by mouth daily 6/21/2018 at AM Yes Reported, Patient   atorvastatin (LIPITOR) 20 MG tablet Take 1 tablet (20 mg) by mouth daily  Patient taking differently: Take 20 mg by mouth every evening  6/27/2018 at PM Yes Darline Childs MD   Calcium Carb-Cholecalciferol (CALCIUM 500 +D) 500-400 MG-UNIT TABS Take 1 tablet by mouth 2 times daily 6/27/2018 at PM Yes Jermain Arcos MD   Ferrous Sulfate (IRON SUPPLEMENT PO) Take 1 tablet by mouth 2 times daily 6/27/2018 at 1800 Yes Reported, Patient   gabapentin (NEURONTIN) 300 MG capsule Take 1 capsule (300 mg) by mouth 3 times daily 6/27/2018 at PM Yes Darline Childs MD   lisinopril (PRINIVIL/ZESTRIL) 20 MG tablet Take 1 tablet (20 mg) by mouth daily 6/27/2018 at AM Yes Darline Childs MD   oxyCODONE-acetaminophen (PERCOCET) 5-325 MG per tablet Take 1 tablet by mouth every 6 hours as needed for severe pain 6/27/2018 at PM Yes Darline Childs MD

## 2018-06-28 NOTE — CONSULTS
[facility]    Vascular Medicine Consultation     Date of Admission:  6/28/2018  Date of Consult (When I saw the patient): 06/28/18         Physician Supervisory Attestation:   I have reviewed and discussed with the physician assistant their history, physical and plan and independently interviewed and examined Trish Wu and agree with the plan as stated in the physician assistant note.      This is a very pleasant 73 year old female former smoker with a history of hypertension, iron deficiency anemia, and hyperlipidemia who in 2016 was found incidentally on an -xray of her back to have a 4.0 cm AAA. She was first seen by Dr. Meraz of Vascular Surgery in 4/2017 at which time the aneurysm measured 4.3 cm. Plan was to follow up in one year. She scheduled her one year follow-up, but just prior to that developed sensation changes in her right leg and pain near her right ankle. Her pulses were noted to be different. An arterial duplex revealed dampened monophasic waveforms throughout the right lower extremity. A CTA was then done, showing a stable 4.3 cm AAA with essentially no neck and moderate mural thrombus, occlusion of bilateral proximal iliac arteries, severely diseased right SFA and occlusion of the proximal posterior tibial artery on the right. No intervention was felt to be needed at that time. However, she developed worsening right foot pain, which significantly impacted her lifestyle and she also developed rest pain. JODIE's were 0.16 on the right and 0.42 on the left. She was evaluated again by Dr. Meraz. Given her critical limb ischemia and known AAA, which would not be amenable to endovascular treatment, an aortobifemoral bypass was recommended, for which she presented today.     She was seen and evaluated before the surgery.    She was noted to have iron-deficiency anemia pre-operatively with a hemoglobin around 8.5.   Post operative care, activity pain management per vasc  surgery.  Monitor post op hemoglobin, consider H2 blocker or PPI post op  Continue statin  IV BB post op HTN management and restart po ACE I once able to take PO and renal Fx stable.    Thank you for the consult !  Vascular Medicine service will follow with you    Evan Pittman MD, [facility]  Vascular Medicine   6/28/2018      Assessment & Plan   1. Assymptomatic 4.3 cm AAA with known occlusion of proximal bilateral iliac arteries and severely diseased right SFA now with critical limb ischemia of rest pain undergoing an aortobifemoral bypass 6/28/18    Post-operative cares per Dr. Meraz / Vascular Medicine. Aspirin will be resumed. She was noted to have iron-deficiency anemia pre-operatively with a hemoglobin around 8.5. She also admits to a remote history of a bleeding gastric ulcer. She will benefit from going on a PPI or H2 blocker while also on aspirin to prevent any GI irritation. Close attention will be paid to her hemoglobin this admission.     2. Hyperlipidemia    Her lipids are presently well controlled (LDL < 70). She should continue on Atorvastatin 20 mg daily.     3. Hypertension    She will be continued on her prior to admission Lisinopril and her blood pressure will be monitored closely.     4. Anemia    She has been noted to have a hemoglobin around 8.5 pre-operatively. She was recently initiated on iron supplementation. Her hemoglobin will need to be monitored closely given the expected blood loss with her surgical procedure. Blood can be given as needed. AN etiology for her anemia has yet to be found. She admits to a prior history of a bleeding gastric ulcer, but denies any bloody or black stools. She states her last colonoscopy was a very long time ago. No records for one are available in her chart. It is recommended that she undergo a colonoscopy once she recovers from her surgery. In addition, a PPI or H2 blocker may be beneficial given her prior history of bleeding ulcer and now  requiring aspirin.       Reason for Consult   Reason for consult: Asked by Dr. Meraz to evaluate vascular risk factors and assist with medical management in this 73 year old female former smoker with hyperlipidemia, hypertension, anemia, and 4.3 cm AAA with critical limb ischemia of rest pain right lower extremity undergoing an aortobifemoral bypass today.     Primary Care Physician   Darline Childs      History of Present Illness   Trish Wu is a 73 year old female former smoker with a history of hypertension, iron deficiency anemia, and hyperlipidemia who in 2016 was found incidentally on an -xray of her back to have a 4.0 cm AAA. She was first seen by Dr. Meraz of Vascular Surgery in 4/2017 at which time the aneurysm measured 4.3 cm. Plan was to follow up in one year. She scheduled her one year follow-up, but just prior to that developed sensation changes in her right leg and pain near her right ankle. Her pulses were noted to be different. An arterial duplex revealed dampened monophasic waveforms throughout the right lower extremity. A CTA was then done, showing a stable 4.3 cm AAA with essentially no neck and moderate mural thrombus, occlusion of bilateral proximal iliac arteries, severely diseased right SFA and occlusion of the proximal posterior tibial artery on the right. No intervention was felt to be needed at that time. However, she developed worsening right foot pain, which significantly impacted her lifestyle and she also developed rest pain. JODIE's were 0.16 on the right and 0.42 on the left. She was evaluated again by Dr. Meraz. Given her critical limb ischemia and known AAA, which would not be amenable to endovascular treatment, an aortobifemoral bypass was recommended, for which she presented today.     Past Medical History   Past Medical History:   Diagnosis Date     Aortic aneurysm (H)      Environmental allergies 6/14/2016     Essential hypertension with goal blood pressure less than  140/90 6/14/2016     Hip pain, left      Osteopenia        Past Surgical History   Past Surgical History:   Procedure Laterality Date     CATARACT IOL, RT/LT       HYSTERECTOMY TOTAL ABDOMINAL  1966     MAMMOPLASTY AUGMENTATION       VAGOTOMY, PYLOROPLASTY, COMBINED         Prior to Admission Medications   Prior to Admission Medications   Prescriptions Last Dose Informant Patient Reported? Taking?   ASPIRIN EC PO 6/21/2018 at AM Self Yes Yes   Sig: Take 81 mg by mouth daily   Calcium Carb-Cholecalciferol (CALCIUM 500 +D) 500-400 MG-UNIT TABS 6/27/2018 at PM Self No Yes   Sig: Take 1 tablet by mouth 2 times daily   Ferrous Sulfate (IRON SUPPLEMENT PO) 6/27/2018 at 1800 Self Yes Yes   Sig: Take 1 tablet by mouth 2 times daily   atorvastatin (LIPITOR) 20 MG tablet 6/27/2018 at PM Self No Yes   Sig: Take 1 tablet (20 mg) by mouth daily   Patient taking differently: Take 20 mg by mouth every evening    gabapentin (NEURONTIN) 300 MG capsule 6/27/2018 at PM Self No Yes   Sig: Take 1 capsule (300 mg) by mouth 3 times daily   lisinopril (PRINIVIL/ZESTRIL) 20 MG tablet 6/27/2018 at AM Self No Yes   Sig: Take 1 tablet (20 mg) by mouth daily   oxyCODONE-acetaminophen (PERCOCET) 5-325 MG per tablet 6/27/2018 at PM Self No Yes   Sig: Take 1 tablet by mouth every 6 hours as needed for severe pain      Facility-Administered Medications: None     Allergies   Allergies   Allergen Reactions     Penicillins Hives       Social History   Trish Wu  reports that she has quit smoking. She has never used smokeless tobacco. She reports that she drinks alcohol. She reports that she does not use illicit drugs.    Family History   Family History   Problem Relation Age of Onset     Osteoperosis Mother      Type 2 Diabetes Son        Review of Systems   The 10 point Review of Systems is negative other than noted in the HPI or here.     Physical Exam   Temp: 97  F (36.1  C) Temp src: Temporal BP: 142/68   Heart Rate: 75 Resp: 16 SpO2: 99 % O2  Device: None (Room air)    Vital Signs with Ranges  Temp:  [97  F (36.1  C)] 97  F (36.1  C)  Heart Rate:  [75] 75  Resp:  [16] 16  BP: (142)/(68) 142/68  SpO2:  [99 %] 99 %  133 lbs 0 oz    Constitutional: awake, alert, cooperative, no apparent distress, and appears stated age  Eyes: Lids and lashes normal, pupils equal, round and reactive to light, extra ocular muscles intact, sclera clear, conjunctiva normal  ENT: normocepalic, without obvious abnormality, oropharynx pink and moist  Hematologic / Lymphatic: no lymphadenopathy  Respiratory: No increased work of breathing, good air exchange, clear to auscultation bilaterally, no crackles or wheezing  Cardiovascular: regular rate and rhythm, normal S1 and S2 and no murmur noted  GI: Normal bowel sounds, soft, non-distended, non-tender  Skin: no redness, warmth, or swelling, no rashes and no lesions  Musculoskeletal: There is no redness, warmth, or swelling of the joints.  Full range of motion noted.  Motor strength is 5 out of 5 all extremities bilaterally.  Tone is normal.  Neurologic: Awake, alert, oriented to name, place and time.  Cranial nerves II-XII are grossly intact.  Motor is 5 out of 5 bilaterally.    Neuropsychiatric:  Normal affect, memory, insight.  Pulses: Unable to palpate pedal pulses. No carotid bruits appreciated.     Data   Most Recent 3 CBC's:  Recent Labs   Lab Test  06/22/18   1028  06/15/18   1012  06/13/18   1252   WBC  5.4  5.9  5.4   HGB  8.5*  8.7*  8.1*   MCV  85  82  82   PLT  403  373  335     Most Recent 3 BMP's:  Recent Labs   Lab Test  06/28/18   1057  06/22/18   1028  06/15/18   1012  06/13/18   1252   NA   --   133  126*  125*   POTASSIUM  3.8  4.7  3.7  3.9   CHLORIDE   --   101  92*  92*   CO2   --   23  23  23   BUN   --   10  15  19   CR   --   0.81  0.96  0.87   ANIONGAP   --   9  11  10   SHAHAB   --   8.8  9.1  8.6   GLC   --   101*  106*  108*     Most Recent Cholesterol Panel:  Recent Labs   Lab Test  06/28/18   1057   CHOL   102   LDL  19   HDL  59   TRIG  122     Most Recent Hemoglobin A1c:  Recent Labs   Lab Test  06/28/18   1057   A1C  5.2

## 2018-06-28 NOTE — BRIEF OP NOTE
[facility] Brief Operative Note    Pre-operative diagnosis: abdominal aortic aneurysm, iliac occlusive diease    Post-operative diagnosis same   Procedure: Procedure(s):  AORTOBIFEMORAL BYPASS - Wound Class: I-Clean   Surgeon(s): Surgeon(s) and Role:     * Ulisses Meraz MD - Primary     * Kavin Paul PA-C - Assisting     * Germán Simmons PA-C - Assisting     * Janice Randle PA-C - Assisting     * Shannon Marley PA-C - Assisting   Estimated blood loss: 200 mL    Specimens: * No specimens in log *   Findings: Right posterior tibialis   Left dorsalis pedis posterior tibialis  18x7 dacron graft.   No complications  Stable    Kavin Paul PA-C  Office: [phone]  Pager: [phone]

## 2018-06-28 NOTE — ANESTHESIA POSTPROCEDURE EVALUATION
Patient: Trish Wu    Procedure(s):  AORTOFEMORAL BYPASS - Wound Class: I-Clean    Diagnosis:abdominal aortic aneurysm, iliac occlusive diease   Diagnosis Additional Information: No value filed.    Anesthesia Type:  General, ETT    Note:  Anesthesia Post Evaluation    Patient location during evaluation: PACU  Patient participation: Able to fully participate in evaluation  Level of consciousness: awake  Pain management: adequate  Airway patency: patent  Cardiovascular status: acceptable  Respiratory status: acceptable  Hydration status: acceptable  PONV: none and controlled     Anesthetic complications: None    Comments: InPacu CXR for line placement pending. No complications         Last vitals:  Vitals:    06/28/18 1740 06/28/18 1750 06/28/18 1800   BP: 122/58 125/60 126/60   Resp: 10 12 13   Temp:  34.7  C (94.5  F) 34.9  C (94.8  F)   SpO2: 100% 100% 100%         Electronically Signed By: Gene Salmon MD  June 28, 2018  6:23 PM

## 2018-06-28 NOTE — ANESTHESIA PROCEDURE NOTES
ARTERIAL LINE PROCEDURE NOTE:   Pre-Procedure  Performed by KADEN ADDISON  Location: pre-op      Pre-Anesthestic Checklist: patient identified, IV checked, risks and benefits discussed and informed consent    Timeout  Correct Patient: Yes   Correct Procedure: Yes   Correct Site: Yes   Correct Laterality: N/A   Correct Position: Yes   Site Marked: N/A   .   Procedure Documentation  Procedure: arterial line  ASA 3  Supine  Insertion Site:left, radial.Skin infiltrated with mL of 1% lidocaine. Injection technique: Seldinger Technique  .  .  Patient Prep;chlorhexidine gluconate and isopropyl alcohol, patient draped    Assessment/Narrative    Catheter: 20 gauge, 12 cm     Secured by suture  Tegaderm dressing used.    Arterial waveform: Yes     Comments:  Arterial Line  No complications

## 2018-06-28 NOTE — IP AVS SNAPSHOT
[id number]    [address] [place] [address]    Phone:  [phone]                                       After Visit Summary   6/28/2018    Trish Wu    MRN: [id number]           After Visit Summary Signature Page     I have received my discharge instructions, and my questions have been answered. I have discussed any challenges I see with this plan with the nurse or doctor.    ..........................................................................................................................................  Patient/Patient Representative Signature      ..........................................................................................................................................  Patient Representative Print Name and Relationship to Patient    ..................................................               ................................................  Date                                            Time    ..........................................................................................................................................  Reviewed by Signature/Title    ...................................................              ..............................................  Date                                                            Time

## 2018-06-28 NOTE — ANESTHESIA PROCEDURE NOTES
CENTRAL LINE INSERTION PROCEDURE NOTE:   Pre-Procedure  Performed by KADEN ADDISON  Location: OR      Pre-Anesthestic Checklist: patient identified, risks and benefits discussed and informed consent    Timeout  Correct Patient: Yes   Correct Procedure: Yes   Correct Site: Yes   Correct Laterality: N/A   Correct Position: Yes   Site Marked: N/A   .   Procedure Documentation   Procedure: central line  Position: Trendelenburg  Patient Prep;chlorhexidine gluconate and isopropyl alcohol, patient draped      Insertion Site:internal jugular, right        Assessment/Narrative         Secured by suture  Tegaderm and Biopatch dressing used.  blood aspirated from all lumens  All lumens flushed: Yes    Comments:  Central Line  No complications.

## 2018-06-28 NOTE — OP NOTE
Procedure Date: 06/28/2018      PREOPERATIVE DIAGNOSIS:  Severe aortoiliac occlusive disease with juxtarenal abdominal aortic aneurysm.      POSTOPERATIVE DIAGNOSIS:   Severe aortoiliac occlusive disease with juxtarenal abdominal aortic aneurysm.      PROCEDURE PERFORMED:  Repair of juxtarenal abdominal aortic aneurysm with aorta bilateral femoral bypass with 18 x 9 mm Dacron graft (D10).      SURGEON:  Ulisses Meraz III, MD      SURGICAL ASSISTANT:  Kavin Paul PA-C      DESCRIPTION OF PROCEDURE:  Under satisfactory general anesthesia, the patient was prepped and draped in a routine sterile fashion.  After performing the appropriate timeout, a longitudinal incision was made over both groins and dissection sharply carried down to the common femoral arteries.  Both common femoral arteries were exposed as were the superficial femoral and profunda femoris arteries.  They were all encircled with vessel loops using sharp and blunt dissection.  It appeared that there was a good landing zone in the left common femoral onto the superficial femoral artery and in the right superficial femoral artery.  A midline incision was then made from the xiphoid to the pubis, incorporating the patient's previous upper midline incision.  This also skirted to the left of the umbilicus.  The patient had multiple adhesions present from her previous operation, where the omentum was densely adherent into the left gutter and the pelvis.  All these adhesions were freed up and the omentum was mobilized, so that we could retract the colon superiorly.  The procedure was significantly more difficult than normal due to the fact that this was a reoperation as well as the fact that the aneurysm was in the juxtarenal position and she had heavily calcified arteries throughout.  Also, an assistant was required for this procedure for exposure, control of hemostasis and help with all anastomoses.  The retroperitoneum over the abdominal aortic aneurysm was  carefully opened and dissection sharply carried up to the level of the left renal vein.  The patient had heavily calcified arteries from this level down to the common iliacs.  Both common iliac arteries were occluded with atheromatous debris.  I was able to find a soft area of aortic wall to clamp at the level of the renal arteries.  Tunnels were fashioned retroperitoneally along both iliac arteries to the groin level.  After this was completed, the patient was systemically heparinized with 3000 units of IV heparin.  The aorta was then occluded just below the level of the renal arteries.  The aneurysm was a bilobed aneurysm.  It was opened in a longitudinal fashion.  Its contents were endarterectomized.  There was bleeding from lumbar vessels, which was controlled with interrupted sutures of 3-0 silk.  The aneurysm wall was T'd off just below the renal arteries.  An 18 x 9 Dacron graft was then anastomosed to the aorta in an end-to-end orientation with running suture of 3-0 Prolene.  A portion of the graft was used as a buttress along the outside of the arterial wall for added strength to the anastomosis.  After this was completed, left limb of the graft was passed through the previous retroperitoneal tunnel to the left groin.  The common femoral, superficial femoral and profunda femoris arteries were occluded.  The common femoral artery was opened in a longitudinal arteriotomy.  The arteriotomy was carried down onto the superficial femoral artery for approximately 1.5 cm.  Left limb of the graft was then cut to length and spatulated and anastomosed to this arteriotomy in an end-to-side orientation with running suture of 5-0 Prolene.  The graft was then flushed out the right limb.  The proximal anastomosis was checked for hemostasis.  There was adequate hemostasis.  Flow was then restored to the left lower extremity.  In like fashion, the right limb of the graft was passed to the right groin.  The common femoral,  superficial femoral and profunda femoris arteries were occluded.  The superficial femoral artery was opened in a longitudinal arteriotomy.  The right limb of the graft was then cut to length and spatulated and anastomosed to the arteriotomy in end-to-side orientation with running suture of 5-0 Prolene.  There was fair backbleeding from the right superficial femoral and profunda femoris arteries as there was on the left side when checked prior to completion of the left anastomosis.  The right limb of the graft to superficial femoral artery anastomosis was then completed and flow was restored to the right lower extremity.  All wounds were irrigated out with saline solution and checked for hemostasis.  The wall of the aneurysm was closed over the graft with running sutures of 2-0 Vicryl.  Retroperitoneum was closed with running sutures of 2-0 Vicryl.  The abdomen was closed en bloc with running sutures of #1 PDS.  Skin was closed with metal staples.  Both groin incisions were closed in layers with running sutures of 2-0 and 3-0 Vicryl.  Skin incisions were closed with 4-0 subcuticular Vicryl stitches.  At the termination of the procedure, the patient had a strong Doppler signal over the left dorsalis pedis artery and right posterior tibial artery.  Sponge and needle counts were correct at the termination of the procedure.  The patient tolerated the procedure in stable condition and was taken to the post-anesthetic recovery room.         ITALIA MESSINA III, MD             D: 2018   T: 2018   MT: GUSTAVO      Name:     ASAF LUNA   MRN:      [id number]        Account:        [id number]   :      1945           Procedure Date: 2018      Document: [id number]

## 2018-06-29 LAB
BLD PROD TYP BPU: NORMAL
BLD UNIT ID BPU: 0
BLOOD PRODUCT CODE: NORMAL
BPU ID: NORMAL
BUN SERPL-MCNC: 12 MG/DL (ref 7–30)
CREAT SERPL-MCNC: 1.01 MG/DL (ref 0.52–1.04)
ERYTHROCYTE [DISTWIDTH] IN BLOOD BY AUTOMATED COUNT: 16.1 % (ref 10–15)
ERYTHROCYTE [DISTWIDTH] IN BLOOD BY AUTOMATED COUNT: 16.3 % (ref 10–15)
GFR SERPL CREATININE-BSD FRML MDRD: 54 ML/MIN/1.7M2
GLUCOSE BLDC GLUCOMTR-MCNC: 108 MG/DL (ref 70–99)
GLUCOSE BLDC GLUCOMTR-MCNC: 109 MG/DL (ref 70–99)
GLUCOSE BLDC GLUCOMTR-MCNC: 110 MG/DL (ref 70–99)
GLUCOSE BLDC GLUCOMTR-MCNC: 110 MG/DL (ref 70–99)
GLUCOSE BLDC GLUCOMTR-MCNC: 119 MG/DL (ref 70–99)
GLUCOSE BLDC GLUCOMTR-MCNC: 119 MG/DL (ref 70–99)
GLUCOSE BLDC GLUCOMTR-MCNC: 123 MG/DL (ref 70–99)
GLUCOSE BLDC GLUCOMTR-MCNC: 128 MG/DL (ref 70–99)
GLUCOSE BLDC GLUCOMTR-MCNC: 141 MG/DL (ref 70–99)
GLUCOSE SERPL-MCNC: 126 MG/DL (ref 70–99)
HCT VFR BLD AUTO: 24.2 % (ref 35–47)
HCT VFR BLD AUTO: 27.4 % (ref 35–47)
HGB BLD-MCNC: 7.7 G/DL (ref 11.7–15.7)
HGB BLD-MCNC: 8.4 G/DL (ref 11.7–15.7)
HGB BLD-MCNC: 8.6 G/DL (ref 11.7–15.7)
HGB BLD-MCNC: 9 G/DL (ref 11.7–15.7)
INR PPP: 1.09 (ref 0.86–1.14)
LACTATE BLD-SCNC: 0.6 MMOL/L (ref 0.7–2)
MCH RBC QN AUTO: 26.7 PG (ref 26.5–33)
MCH RBC QN AUTO: 27.3 PG (ref 26.5–33)
MCHC RBC AUTO-ENTMCNC: 31.8 G/DL (ref 31.5–36.5)
MCHC RBC AUTO-ENTMCNC: 32.8 G/DL (ref 31.5–36.5)
MCV RBC AUTO: 83 FL (ref 78–100)
MCV RBC AUTO: 84 FL (ref 78–100)
PLATELET # BLD AUTO: 218 10E9/L (ref 150–450)
PLATELET # BLD AUTO: 262 10E9/L (ref 150–450)
POTASSIUM SERPL-SCNC: 4 MMOL/L (ref 3.4–5.3)
RBC # BLD AUTO: 2.88 10E12/L (ref 3.8–5.2)
RBC # BLD AUTO: 3.3 10E12/L (ref 3.8–5.2)
SODIUM SERPL-SCNC: 136 MMOL/L (ref 133–144)
TRANSFUSION STATUS PATIENT QL: NORMAL
TRANSFUSION STATUS PATIENT QL: NORMAL
WBC # BLD AUTO: 5.4 10E9/L (ref 4–11)
WBC # BLD AUTO: 6.7 10E9/L (ref 4–11)

## 2018-06-29 PROCEDURE — 84520 ASSAY OF UREA NITROGEN: CPT | Performed by: SURGERY

## 2018-06-29 PROCEDURE — 00000146 ZZHCL STATISTIC GLUCOSE BY METER IP

## 2018-06-29 PROCEDURE — 25000128 H RX IP 250 OP 636: Performed by: SURGERY

## 2018-06-29 PROCEDURE — 20000003 ZZH R&B ICU

## 2018-06-29 PROCEDURE — 85018 HEMOGLOBIN: CPT | Performed by: PHYSICIAN ASSISTANT

## 2018-06-29 PROCEDURE — 99291 CRITICAL CARE FIRST HOUR: CPT | Performed by: INTERNAL MEDICINE

## 2018-06-29 PROCEDURE — 83605 ASSAY OF LACTIC ACID: CPT | Performed by: SURGERY

## 2018-06-29 PROCEDURE — 84295 ASSAY OF SERUM SODIUM: CPT | Performed by: SURGERY

## 2018-06-29 PROCEDURE — 25000128 H RX IP 250 OP 636: Performed by: PHYSICIAN ASSISTANT

## 2018-06-29 PROCEDURE — 85610 PROTHROMBIN TIME: CPT | Performed by: SURGERY

## 2018-06-29 PROCEDURE — 82565 ASSAY OF CREATININE: CPT | Performed by: SURGERY

## 2018-06-29 PROCEDURE — 25000131 ZZH RX MED GY IP 250 OP 636 PS 637: Performed by: SURGERY

## 2018-06-29 PROCEDURE — 25000132 ZZH RX MED GY IP 250 OP 250 PS 637: Performed by: PHYSICIAN ASSISTANT

## 2018-06-29 PROCEDURE — 25000125 ZZHC RX 250: Performed by: PHYSICIAN ASSISTANT

## 2018-06-29 PROCEDURE — 82947 ASSAY GLUCOSE BLOOD QUANT: CPT | Performed by: SURGERY

## 2018-06-29 PROCEDURE — 25000125 ZZHC RX 250: Performed by: INTERNAL MEDICINE

## 2018-06-29 PROCEDURE — 84132 ASSAY OF SERUM POTASSIUM: CPT | Performed by: SURGERY

## 2018-06-29 PROCEDURE — P9016 RBC LEUKOCYTES REDUCED: HCPCS | Performed by: SURGERY

## 2018-06-29 PROCEDURE — 85027 COMPLETE CBC AUTOMATED: CPT | Performed by: SURGERY

## 2018-06-29 RX ORDER — ASPIRIN 300 MG/1
300 SUPPOSITORY RECTAL DAILY
Status: DISCONTINUED | OUTPATIENT
Start: 2018-06-29 | End: 2018-07-01

## 2018-06-29 RX ORDER — NICOTINE POLACRILEX 4 MG
15-30 LOZENGE BUCCAL
Status: DISCONTINUED | OUTPATIENT
Start: 2018-06-29 | End: 2018-06-29

## 2018-06-29 RX ORDER — DEXTROSE MONOHYDRATE 25 G/50ML
25-50 INJECTION, SOLUTION INTRAVENOUS
Status: DISCONTINUED | OUTPATIENT
Start: 2018-06-29 | End: 2018-06-29

## 2018-06-29 RX ADMIN — SODIUM CHLORIDE 500 ML: 9 INJECTION, SOLUTION INTRAVENOUS at 05:56

## 2018-06-29 RX ADMIN — SODIUM CHLORIDE: 9 INJECTION, SOLUTION INTRAVENOUS at 09:28

## 2018-06-29 RX ADMIN — SODIUM CHLORIDE: 9 INJECTION, SOLUTION INTRAVENOUS at 19:03

## 2018-06-29 RX ADMIN — PROCHLORPERAZINE EDISYLATE 5 MG: 5 INJECTION INTRAMUSCULAR; INTRAVENOUS at 08:57

## 2018-06-29 RX ADMIN — SODIUM CHLORIDE 500 ML: 9 INJECTION, SOLUTION INTRAVENOUS at 12:01

## 2018-06-29 RX ADMIN — ONDANSETRON 4 MG: 2 INJECTION INTRAMUSCULAR; INTRAVENOUS at 06:50

## 2018-06-29 RX ADMIN — KETOROLAC TROMETHAMINE 15 MG: 15 INJECTION, SOLUTION INTRAMUSCULAR; INTRAVENOUS at 01:36

## 2018-06-29 RX ADMIN — ASPIRIN 300 MG: 300 SUPPOSITORY RECTAL at 14:16

## 2018-06-29 RX ADMIN — PANTOPRAZOLE SODIUM 40 MG: 40 INJECTION, POWDER, FOR SOLUTION INTRAVENOUS at 10:50

## 2018-06-29 RX ADMIN — CLINDAMYCIN PHOSPHATE 900 MG: 900 INJECTION, SOLUTION INTRAVENOUS at 05:03

## 2018-06-29 RX ADMIN — SODIUM CHLORIDE 0.2 UNITS/HR: 9 INJECTION, SOLUTION INTRAVENOUS at 08:44

## 2018-06-29 RX ADMIN — ONDANSETRON 4 MG: 2 INJECTION INTRAMUSCULAR; INTRAVENOUS at 23:38

## 2018-06-29 RX ADMIN — METOPROLOL TARTRATE 5 MG: 5 INJECTION, SOLUTION INTRAVENOUS at 08:35

## 2018-06-29 RX ADMIN — KETOROLAC TROMETHAMINE 15 MG: 15 INJECTION, SOLUTION INTRAMUSCULAR; INTRAVENOUS at 08:26

## 2018-06-29 RX ADMIN — ENOXAPARIN SODIUM 40 MG: 40 INJECTION SUBCUTANEOUS at 16:26

## 2018-06-29 ASSESSMENT — PAIN DESCRIPTION - DESCRIPTORS: DESCRIPTORS: INTERMITTENT

## 2018-06-29 NOTE — PROGRESS NOTES
"Vascular Surgery Note    Stable S/P Repair of Juxtarenal AAA with Aorta Bilateral Femoral Bypass with Dacron graft  POD1    -Continue cares per Vascular Medicine/Intensivist  -Dressings look ok now.  Change tomorrow or PRN saturation again  -Up to chair if/when BP improves today  -Continue NGT with ice chips only  -Monitor Hgb until stable.    Doing ok.  Pain in groins worse than abdominal.  Throat discomfort and feels she is only able to swallow with ice chips.  Denies N/V.  Pain with coughing as expected.  Overall, pain at 4/10 without moving.    NAD, pleasant, alert  /56 (BP Location: Left arm)  Temp 97.7  F (36.5  C) (Oral)  Resp 28  Ht 1.676 m (5' 6\")  Wt 62.7 kg (138 lb 3.2 oz)  SpO2 97%  BMI 22.31 kg/m2    Intake/Output Summary (Last 24 hours) at 06/29/18 1029  Last data filed at 06/29/18 0900   Gross per 24 hour   Intake           5029.6 ml   Output             1540 ml   Net           3489.6 ml     Abd: soft, appropriate tenderness, ND  Dressing: CDI with minimal drainage noted  Groin: dressing same.  No hematoma  LE: palpable pulse in Rt posterior tibialis and Lt DP.  Cannot palpate Rt DP or Lt PT.  Did not doppler today.  Feet are warm.  No swelling.    WBC: WNL  Hgb: 7.7 (9.0 earlier this am)  Creatinine: 1.01  Lactic Acid: 0.6  "

## 2018-06-29 NOTE — PROVIDER NOTIFICATION
Justus Werner called with [facility]  0.6  ,  Orders again to give one unit of blood and do not give fld bolus.  Blood ordered .  Dr. Donnelly rounded and wanted hgb, which was 7.7 .

## 2018-06-29 NOTE — PROVIDER NOTIFICATION
Prescriber Notification Note  The pharmacist has communicated with this patient's provider regarding a concern or therapy recommendation.     Date/Time of Notification: 06/29/2018 1125  Interaction: Spoke to Dr Pittman about concern for Lovenox order starting tonight at 1600 in the setting of coffee-ground emesis/NG output this am. Patient has aspirin ordered and recent ketorolac administration with PMH including a distant history of GI ulcer. Recommend DC Lovenox in favor of PCDs for DVT prophylaxis until acute bleed resolved/ruled out. IV Ketorolac already discontinued this am and Protonix IV started. Considering aspirin 81 and PCDs pending assessment of hemoglobin    Comments/Additional Details:  Awaiting new orders

## 2018-06-29 NOTE — PROGRESS NOTES
ICU Multi-Disciplinary Note  Patient condition reviewed and discussed while on multidisciplinary rounds today. Ms. Wu is a 73 year old woman who comes to ICU following aorta bilateral femoral bypass with dacron graft.  Post-operatively she is having mild hypotension and low urine output. Additionally, reportedly having coffee ground output from NG as well as nausea.  One unit of blood was ordered by Vascular Medicine.  Pharmacy contacted Vascular medicine regarding continuing Lovenox therapy in the setting of coffee ground output.    Please note these minor interventions that were initiated:  1. Requested RN give 500mL NS bolus for low urine output      The Critical Care service will continue to follow peripherally while patient is within the ICU. We are readily available should issues arise.  Please feel free to contact us for critical care issues with which we may be of assistance. For all other concerns, please contact primary service first.     Joanie Anna, [facility]

## 2018-06-29 NOTE — PROGRESS NOTES
[facility]    Vascular Medicine Progress Note    Date of Service (when I saw the patient): 06/29/2018          Critical care time spent 35 minutes today      Assessment & Plan      Trish Wu is a 73 year old female who was admitted on 6/28/2018.     1. Asymptomatic Juxta renal  4.3 cm AAA with known occlusion of proximal bilateral iliac arteries and severely diseased right SFA now with critical limb ischemia of rest pain     S/p  Repair of juxtarenal abdominal aortic aneurysm with aorta bilateral femoral bypass with 18 x 9 mm Dacron graft   On 6/28/2018    POD 1, c/o severe nausea, getting zofran and compazine  Hgb early am 9.0  NG with LIS coffee ground liquid   She was getting IV torodol for post op pain and also on PCA  Hx of bleeding gastric ulcer many years ago  BP is soft side.  On IV inslun drip  Abd: dressing in place minimal staining  Feet are warm and well perfused , good pulses.  No BS,   Sanders in place    Plan:  D/c IV torodol  Start IV protonix 40 mg daily  Check lactate and CBC now, consider PRBC transfusion if HGB less than 8   Keep in ICU   discussed with Nursing staff.  Hold BP meds if SBP less than 100.  Continue PCA  Continue insulin drip for another day and keep BS under control   Lovenox for DVT prophylaxis    2. Hyperlipidemia     Her lipids are presently well controlled (LDL < 70). She should continue on Atorvastatin 20 mg daily and restart once able to take Po      3. Hypertension     Currently hypotension , post op hold meds and restart once BP improves and able to take po     4. Anemia     She has been noted to have a hemoglobin around 8.5 pre-operatively. She was recently initiated on iron supplementation. Her hemoglobin will need to be monitored closely given the expected blood loss with her surgical procedure. Blood can be given as needed. An etiology for her anemia has yet to be found. She admits to a prior history of a bleeding gastric ulcer, but denies any bloody  or black stools. She states her last colonoscopy was a very long time ago. No records for one are available in her chart. It is recommended that she undergo a colonoscopy once she recovers from her surgery.    Monitor serial HGB  Avoid NSAIDS  IV protonix 40 mg daily     Critical care time spent 35 minutes today       Evan Pittman MD,[facility]  Vascular Medicine    Interval History     Hypotensive, NG LIS with coffee ground liquid.  HGb 9.0 early am  Severe nausea getting zofran and compazine  BP soft side 90 systolic  On IV insulin drip    Physical Exam   Temp: 97.7  F (36.5  C) Temp src: Oral BP: 118/56   Heart Rate: 86 Resp: 28 (Simultaneous filing. User may not have seen previous data.) SpO2: 97 % O2 Device: Nasal cannula Oxygen Delivery: 4 LPM  Vitals:    06/28/18 1102 06/28/18 2020   Weight: 60.3 kg (133 lb) 62.7 kg (138 lb 3.2 oz)     Vital Signs with Ranges  Temp:  [94.5  F (34.7  C)-97.7  F (36.5  C)] 97.7  F (36.5  C)  Heart Rate:  [] 86  Resp:  [8-57] 28  BP: (113-148)/(56-72) 118/56  MAP:  [63 mmHg-99 mmHg] 64 mmHg  Arterial Line BP: (101-177)/(36-71) 101/45  SpO2:  [90 %-100 %] 97 %  I/O last 3 completed shifts:  In: 5029.6 [I.V.:4479.6]  Out: 1490 [Urine:1040; Emesis/NG output:250; Blood:200]    Constitutional: Awake, alert, cooperative, tired with ongoing nausea  Eyes: Lids and lashes normal, pupils equal, round and reactive to light, extra ocular muscles intact, sclera clear, conjunctiva normal.  ENT: Normocephalic, without obvious abnormality  Respiratory: No increased work of breathing, good air exchange, clear to auscultation bilaterally, no crackles or wheezing.  Cardiovascular: Normal apical impulse, regular rate and rhythm, normal S1 and S2, no S3 or S4, and no murmur noted.  GI:  No BS, dressing and binder in place, minimal oozing and stain  Lymph/Hematologic: No cervical lymphadenopathy and no supraclavicular lymphadenopathy.  Genitourinary:    Sanders catheter in  place  Skin: NAD, Feet are warm, well perfused  Musculoskeletal: No edema   Neurologic: Awake, alert, oriented to name, place and time.  Cranial nerves II-XII are grossly intact.   Neuropsychiatric: tired, resting    Pulses:  Good pedal pulse, biphasic dopplerable DP and PT bilateral, feet well perfused.    Medications     IV fluid REPLACEMENT ONLY       HYDROmorphone       insulin (regular) 0.2 Units/hr (06/29/18 0844)     nitroPRUsside (NIPRIDE) IV infusion ADULT/PEDS GREATER than or EQUAL to 45 kg std conc       sodium chloride 125 mL/hr at 06/29/18 0928       acetaminophen  975 mg Oral Q8H     aspirin  325 mg Oral Daily     enoxaparin  40 mg Subcutaneous Q24H     metoprolol  5 mg Intravenous Q6H     pantoprazole (PROTONIX) IV  40 mg Intravenous Daily with breakfast     sodium chloride (PF)  3 mL Intracatheter Q8H       Data     Recent Labs  Lab 06/29/18  0350 06/28/18  1057 06/22/18  1028   WBC 6.7  --  5.4   HGB 9.0*  --  8.5*   MCV 83  --  85    323 403   INR 1.09  --   --      --  133   POTASSIUM 4.0 3.8 4.7   CHLORIDE  --   --  101   CO2  --   --  23   BUN 12  --  10   CR 1.01 0.82 0.81   ANIONGAP  --   --  9   SHAHAB  --   --  8.8   *  --  101*

## 2018-06-29 NOTE — PLAN OF CARE
Problem: Patient Care Overview  Goal: Plan of Care/Patient Progress Review  Outcome: Improving  Pt admitted from PACU at 2020, alert and on 3LPM NC, resp. WDL, blood pressure tappered down slightly along with urine output, NS bolus ordered, increased bleeding noted on gauze from surgery, MD notified and instructed to replace dressing, nausea noted, zofran and compazine given with good result, NG to LIS with moderate amount of coffee ground drainage (MD was aware), insulin gtt started for high glucose and was weaning through shift, perfusion in lower extremities improved in palpation over shift, right leg remains weaker vs. Left, no numbness or tingling.

## 2018-06-29 NOTE — PLAN OF CARE
Problem: Surgery Nonspecified (Adult)  Goal: Signs and Symptoms of Listed Potential Problems Will be Absent, Minimized or Managed (Surgery Nonspecified)  Signs and symptoms of listed potential problems will be absent, minimized or managed by discharge/transition of care (reference Surgery Nonspecified (Adult) CPG).  Outcome: Therapy, progress toward functional goals is gradual  Pt. Started to drop BP after metoprolol iv and was increasing her PCA dilaudid  shots,  Surgeon called Dr. Jerome,  Lactic acid ordered,  WNL , Dr. suárez here checked hgb  Down to 7.7,  Dr. Jerome stated to hold flds since LA normal and then ordered one unit of blood.  In the mean time MAP 50 to 60, fld bolus given per instruction of NP ,  Blood given and SBP in the 100's.  Still low u/o, orders for fld bolus per u/o < 60 q2h.   Pain under control at a 4, RR low at times but sats. Ok, frequent ice chips given sometimes q15 to 30 min.  No further drainage on abd and leg dsg's.  IS to 1000, Ng still coffee ground, ok's to give lovenox and asa per Md.  Serial hgb orders.  Cont. To monitor.

## 2018-06-29 NOTE — PROVIDER NOTIFICATION
MD Notification: Dr. Mark Miguel    Notified Person: Dr. Mark Miguel    Notification Date/Time: 6/29/18 0214    Notification Interaction:  Phone call    Purpose of Notification: increased abdominal site bleeding, borderline urine output    Orders Received: none - instructions only    Comments: Dr. Miguel notified that pt's midline incision had significantly increased outside of drawn borders on gauze in the last two hours, I was instructed to take down dressing to see if pt seemed to be oozing between staples and to replace dressing.  He was ok with pt's urine output being approximately 50mls/hr over the past 6 hours.  We will also place pt. In an abdominal binder and review AM labs.

## 2018-06-29 NOTE — PROVIDER NOTIFICATION
Dr. Jerome notified of lower bp after iv metroprolol and taking more PCA  Doses.  Orders at this time to get LA and call.  MD did not want to give a fld. Bolus at this time.

## 2018-06-30 LAB
ALBUMIN SERPL-MCNC: 2.3 G/DL (ref 3.4–5)
ALP SERPL-CCNC: 59 U/L (ref 40–150)
ALT SERPL W P-5'-P-CCNC: 38 U/L (ref 0–50)
ANION GAP SERPL CALCULATED.3IONS-SCNC: 8 MMOL/L (ref 3–14)
AST SERPL W P-5'-P-CCNC: 71 U/L (ref 0–45)
BASOPHILS # BLD AUTO: 0 10E9/L (ref 0–0.2)
BASOPHILS NFR BLD AUTO: 0.2 %
BILIRUB SERPL-MCNC: 0.4 MG/DL (ref 0.2–1.3)
BUN SERPL-MCNC: 13 MG/DL (ref 7–30)
CALCIUM SERPL-MCNC: 6.7 MG/DL (ref 8.5–10.1)
CHLORIDE SERPL-SCNC: 109 MMOL/L (ref 94–109)
CO2 SERPL-SCNC: 21 MMOL/L (ref 20–32)
CREAT SERPL-MCNC: 0.96 MG/DL (ref 0.52–1.04)
DIFFERENTIAL METHOD BLD: ABNORMAL
EOSINOPHIL # BLD AUTO: 0 10E9/L (ref 0–0.7)
EOSINOPHIL NFR BLD AUTO: 0 %
ERYTHROCYTE [DISTWIDTH] IN BLOOD BY AUTOMATED COUNT: 16.5 % (ref 10–15)
GFR SERPL CREATININE-BSD FRML MDRD: 57 ML/MIN/1.7M2
GLUCOSE BLDC GLUCOMTR-MCNC: 102 MG/DL (ref 70–99)
GLUCOSE BLDC GLUCOMTR-MCNC: 111 MG/DL (ref 70–99)
GLUCOSE BLDC GLUCOMTR-MCNC: 115 MG/DL (ref 70–99)
GLUCOSE BLDC GLUCOMTR-MCNC: 89 MG/DL (ref 70–99)
GLUCOSE BLDC GLUCOMTR-MCNC: 92 MG/DL (ref 70–99)
GLUCOSE BLDC GLUCOMTR-MCNC: 96 MG/DL (ref 70–99)
GLUCOSE SERPL-MCNC: 104 MG/DL (ref 70–99)
HCT VFR BLD AUTO: 26.1 % (ref 35–47)
HGB BLD-MCNC: 8.5 G/DL (ref 11.7–15.7)
HGB BLD-MCNC: 8.5 G/DL (ref 11.7–15.7)
IMM GRANULOCYTES # BLD: 0 10E9/L (ref 0–0.4)
IMM GRANULOCYTES NFR BLD: 0.4 %
INR PPP: 1.17 (ref 0.86–1.14)
LYMPHOCYTES # BLD AUTO: 0.7 10E9/L (ref 0.8–5.3)
LYMPHOCYTES NFR BLD AUTO: 11.8 %
MCH RBC QN AUTO: 28.1 PG (ref 26.5–33)
MCHC RBC AUTO-ENTMCNC: 32.6 G/DL (ref 31.5–36.5)
MCV RBC AUTO: 86 FL (ref 78–100)
MONOCYTES # BLD AUTO: 0.6 10E9/L (ref 0–1.3)
MONOCYTES NFR BLD AUTO: 11.1 %
NEUTROPHILS # BLD AUTO: 4.4 10E9/L (ref 1.6–8.3)
NEUTROPHILS NFR BLD AUTO: 76.5 %
NRBC # BLD AUTO: 0 10*3/UL
NRBC BLD AUTO-RTO: 0 /100
PLATELET # BLD AUTO: 158 10E9/L (ref 150–450)
POTASSIUM SERPL-SCNC: 3.7 MMOL/L (ref 3.4–5.3)
PROT SERPL-MCNC: 5.4 G/DL (ref 6.8–8.8)
RBC # BLD AUTO: 3.02 10E12/L (ref 3.8–5.2)
SODIUM SERPL-SCNC: 138 MMOL/L (ref 133–144)
WBC # BLD AUTO: 5.9 10E9/L (ref 4–11)

## 2018-06-30 PROCEDURE — 40000884 ZZH STATISTIC STEP DOWN HRS NIGHT

## 2018-06-30 PROCEDURE — 99233 SBSQ HOSP IP/OBS HIGH 50: CPT | Performed by: INTERNAL MEDICINE

## 2018-06-30 PROCEDURE — 84520 ASSAY OF UREA NITROGEN: CPT | Performed by: SURGERY

## 2018-06-30 PROCEDURE — 85018 HEMOGLOBIN: CPT | Performed by: SURGERY

## 2018-06-30 PROCEDURE — 25000132 ZZH RX MED GY IP 250 OP 250 PS 637: Performed by: PHYSICIAN ASSISTANT

## 2018-06-30 PROCEDURE — 25000128 H RX IP 250 OP 636: Performed by: PHYSICIAN ASSISTANT

## 2018-06-30 PROCEDURE — 85025 COMPLETE CBC W/AUTO DIFF WBC: CPT | Performed by: INTERNAL MEDICINE

## 2018-06-30 PROCEDURE — 40000885 ZZH STATISTIC STEP DOWN HRS EVENING

## 2018-06-30 PROCEDURE — 12000007 ZZH R&B INTERMEDIATE

## 2018-06-30 PROCEDURE — 25000125 ZZHC RX 250: Performed by: INTERNAL MEDICINE

## 2018-06-30 PROCEDURE — 25000125 ZZHC RX 250: Performed by: PHYSICIAN ASSISTANT

## 2018-06-30 PROCEDURE — 80053 COMPREHEN METABOLIC PANEL: CPT | Performed by: INTERNAL MEDICINE

## 2018-06-30 PROCEDURE — 00000146 ZZHCL STATISTIC GLUCOSE BY METER IP

## 2018-06-30 PROCEDURE — 85610 PROTHROMBIN TIME: CPT | Performed by: INTERNAL MEDICINE

## 2018-06-30 RX ORDER — LIDOCAINE 40 MG/G
CREAM TOPICAL
Status: DISCONTINUED | OUTPATIENT
Start: 2018-06-30 | End: 2018-07-04 | Stop reason: HOSPADM

## 2018-06-30 RX ORDER — NITROGLYCERIN 0.4 MG/1
0.4 TABLET SUBLINGUAL EVERY 5 MIN PRN
Status: DISCONTINUED | OUTPATIENT
Start: 2018-06-30 | End: 2018-07-04 | Stop reason: HOSPADM

## 2018-06-30 RX ADMIN — ONDANSETRON 4 MG: 2 INJECTION INTRAMUSCULAR; INTRAVENOUS at 09:01

## 2018-06-30 RX ADMIN — SODIUM CHLORIDE: 9 INJECTION, SOLUTION INTRAVENOUS at 22:13

## 2018-06-30 RX ADMIN — PANTOPRAZOLE SODIUM 40 MG: 40 INJECTION, POWDER, FOR SOLUTION INTRAVENOUS at 08:40

## 2018-06-30 RX ADMIN — METOPROLOL TARTRATE 5 MG: 5 INJECTION, SOLUTION INTRAVENOUS at 19:58

## 2018-06-30 RX ADMIN — METOPROLOL TARTRATE 5 MG: 5 INJECTION, SOLUTION INTRAVENOUS at 08:39

## 2018-06-30 RX ADMIN — ACETAMINOPHEN 975 MG: 325 TABLET, FILM COATED ORAL at 22:10

## 2018-06-30 RX ADMIN — ASPIRIN 300 MG: 300 SUPPOSITORY RECTAL at 08:39

## 2018-06-30 RX ADMIN — SODIUM CHLORIDE: 9 INJECTION, SOLUTION INTRAVENOUS at 16:52

## 2018-06-30 ASSESSMENT — PAIN DESCRIPTION - DESCRIPTORS
DESCRIPTORS: DISCOMFORT
DESCRIPTORS: DISCOMFORT;BURNING
DESCRIPTORS: DISCOMFORT
DESCRIPTORS: INTERMITTENT

## 2018-06-30 NOTE — PROGRESS NOTES
[facility]    Vascular Medicine Progress Note    Date of Service (when I saw the patient): 06/30/2018        Patient care time spent 35 minutes today      Assessment & Plan      Trish Wu is a 73 year old female who was admitted on 6/28/2018.     1. Asymptomatic Juxta renal  4.3 cm AAA with known occlusion of proximal bilateral iliac arteries and severely diseased right SFA now with critical limb ischemia of rest pain     S/p  Repair of juxtarenal abdominal aortic aneurysm with aorta bilateral femoral bypass with 18 x 9 mm Dacron graft   On 6/28/2018    POD 2,   Pain control is an issue despite PCA dilaudid  Hgb 8.5 , received one unit of PRBC yesterday  NG with LIS total 150 yesterday, some coffee ground but improving  Hx of bleeding gastric ulcer many years ago, started IV protonix yesterday  BP improved 110-120 systolic  Insulin switched to SS, sugars good range  Abd: dressing in place minimal staining  Feet are warm and well perfused , good pulses.  Positive  BS,   Sanders in place, UOP improved with fluids     Plan:  Post op cares and activity per vasc surgery, up in chair today  Transfer to [facility] station 33 today   continue IV protonix 40 mg daily  discussed with Nursing staff.  Continue PCA  Restart lovenox 40 mg sq daily and pneumoboots    2. Hyperlipidemia     Her lipids are presently well controlled (LDL < 70). She should continue on Atorvastatin 20 mg daily and restart once able to take Po      3. Hypertension     Currently improving  hypotension ,  restart once BP improves and able to take po     4. Anemia     She has been noted to have a hemoglobin around 8.5 pre-operatively. She was recently initiated on iron supplementation. Her hemoglobin will need to be monitored closely given the expected blood loss with her surgical procedure. Blood can be given as needed. An etiology for her anemia has yet to be found. She admits to a prior history of a bleeding gastric ulcer, but denies any  bloody or black stools. She states her last colonoscopy was a very long time ago. No records for one are available in her chart. It is recommended that she undergo a colonoscopy once she recovers from her surgery.    Monitor serial HGB  Avoid NSAIDS  IV protonix 40 mg daily     Patient care time spent 35 minutes today       Evan Pittman MD,[facility]  Vascular Medicine    Interval History     BP improved  HGb8.5  Creat normal  Good UOP  BS are good  Improved coffee ground NG out put,    Physical Exam   Temp: 98.9  F (37.2  C) Temp src: Axillary BP: 122/60   Heart Rate: 93 Resp: 22 SpO2: 97 % O2 Device: Nasal cannula Oxygen Delivery: 4 LPM  Vitals:    06/28/18 1102 06/28/18 2020 06/30/18 0600   Weight: 60.3 kg (133 lb) 62.7 kg (138 lb 3.2 oz) 61 kg (134 lb 7.7 oz)     Vital Signs with Ranges  Temp:  [98.4  F (36.9  C)-98.9  F (37.2  C)] 98.9  F (37.2  C)  Heart Rate:  [77-99] 93  Resp:  [8-23] 22  BP: (107-122)/(49-62) 122/60  MAP:  [60 mmHg-73 mmHg] 68 mmHg  Arterial Line BP: ()/(42-49) 107/46  SpO2:  [95 %-100 %] 97 %  I/O last 3 completed shifts:  In: 4028.14 [I.V.:3253.14; IV Piggyback:500]  Out: 1310 [Urine:910; Emesis/NG output:400]    Constitutional: Awake, alert, cooperative, tired with ongoing nausea  Eyes: Lids and lashes normal, pupils equal, round and reactive to light, extra ocular muscles intact, sclera clear, conjunctiva normal.  ENT: Normocephalic, without obvious abnormality NG with LIS  Respiratory: No increased work of breathing, good air exchange, clear to auscultation bilaterally, no crackles or wheezing.  Cardiovascular: Normal apical impulse, regular rate and rhythm, normal S1 and S2, no S3 or S4, and no murmur noted.  GI:  No BS, dressing and binder in place, minimal oozing and stain  Lymph/Hematologic: No cervical lymphadenopathy and no supraclavicular lymphadenopathy.  Genitourinary:    Sanders catheter in place  Skin: NAD, Feet are warm, well perfused  Musculoskeletal: No  edema   Neurologic: Awake, alert, oriented to name, place and time.  Cranial nerves II-XII are grossly intact.   Neuropsychiatric: tired, resting    Pulses:  Good pedal pulse, biphasic dopplerable DP and PT bilateral, feet well perfused.    Medications     IV fluid REPLACEMENT ONLY       HYDROmorphone       nitroPRUsside (NIPRIDE) IV infusion ADULT/PEDS GREATER than or EQUAL to 45 kg std conc       sodium chloride 125 mL/hr at 06/29/18 1903       acetaminophen  975 mg Oral Q8H     aspirin  300 mg Rectal Daily     insulin aspart  1-4 Units Subcutaneous Q4H     metoprolol  5 mg Intravenous Q6H     pantoprazole (PROTONIX) IV  40 mg Intravenous Daily with breakfast     sodium chloride (PF)  3 mL Intracatheter Q8H       Data     Recent Labs  Lab 06/30/18  0400 06/29/18  2240 06/29/18  1625 06/29/18  1000 06/29/18  0350  06/28/18  1057   WBC 5.9  --   --  5.4 6.7  --   --    HGB 8.5* 8.4* 8.6* 7.7* 9.0*  < >  --    MCV 86  --   --  84 83  --   --      --   --  262 218  --  323   INR 1.17*  --   --   --  1.09  --   --      --   --   --  136  --   --    POTASSIUM 3.7  --   --   --  4.0  --  3.8   CHLORIDE 109  --   --   --   --   --   --    CO2 21  --   --   --   --   --   --    BUN 13  --   --   --  12  --   --    CR 0.96  --   --   --  1.01  --  0.82   ANIONGAP 8  --   --   --   --   --   --    SHAHAB 6.7*  --   --   --   --   --   --    *  --   --   --  126*  --   --    ALBUMIN 2.3*  --   --   --   --   --   --    PROTTOTAL 5.4*  --   --   --   --   --   --    BILITOTAL 0.4  --   --   --   --   --   --    ALKPHOS 59  --   --   --   --   --   --    ALT 38  --   --   --   --   --   --    AST 71*  --   --   --   --   --   --    < > = values in this interval not displayed.

## 2018-06-30 NOTE — PROGRESS NOTES
ICU Multi-Disciplinary Note  Ms. Wu is a 73 year old woman who is POD #2 following aorta bilateral femoral bypass with dacron graft.  Post-operatively she was having mild hypotension and low urine output. Additionally, reportedly having coffee ground output from NG as well as nausea.  One unit of blood was ordered by Vascular Medicine.  Pharmacy contacted Vascular medicine regarding continuing Lovenox therapy in the setting of coffee ground output.  Today hemoglobin and hemodynamics remain stable.  Patient has transfer orders for transfer to [facility]    The Critical Care service will continue to follow peripherally while patient is within the ICU. We are readily available should issues arise.  Please feel free to contact us for critical care issues with which we may be of assistance. For all other concerns, please contact primary service first.     Joanie Anna

## 2018-06-30 NOTE — PLAN OF CARE
Problem: Patient Care Overview  Goal: Plan of Care/Patient Progress Review  Outcome: No Change  Pt is alert and oriented. Follows commands appropriately, c/o intermittent pain to right foot. Right foot movement is weaker than left. Urine output adequate. Hgb rechecked 8.6 after the blood. Pt continues to have coffee ground residual from NG to LIS. Okay to transition to IMC per Dr. Jerome but remain in ICU. EtCO2 monitoring 35-38. Insulin drip d/c'd and arterial line d/c'd.

## 2018-06-30 NOTE — PLAN OF CARE
Problem: Patient Care Overview  Goal: Plan of Care/Patient Progress Review  Received from ICU. On IMC. VSS.Sleepy but oriented. NG clamped. IVF's. Taking ice chips small amount. Dressing clean and marked where there was old drainage. CMS unchanged. DP pulses palpable. Using dilaudid pca for pain with relief. Sanders patent.

## 2018-06-30 NOTE — PLAN OF CARE
Problem: Surgery Nonspecified (Adult)  Goal: Signs and Symptoms of Listed Potential Problems Will be Absent, Minimized or Managed (Surgery Nonspecified)  Signs and symptoms of listed potential problems will be absent, minimized or managed by discharge/transition of care (reference Surgery Nonspecified (Adult) CPG).   Outcome: Therapy, progress toward functional goals is gradual  Some nausea this am, relief with zofran, mod to severe pain with getting up to the chair,  NC decreased to 2 L, minimal pain in bed with PCA at 0.3 mcg every 10 min.   Mod. U/o, good bp, afebrile, cont. To mobilize and do IS.  Transfering to [address] [place] [address].

## 2018-06-30 NOTE — PROGRESS NOTES
Mrs. Wu is postoperative day 2 from an aortobifemoral bypass.  She tells me that her pain is slightly worse in spite of the same dose of the Dilaudid patient controlled analgesia.  She does report that she does not have any flatus at present.  I reviewed her vital signs as well as urine output.  Both are satisfactory.    On examination  She is in good spirits.  Her abdomen is soft.  She has bowel sounds.  Both feet are warm and well perfused.  I did not remove the surgical dressings.    I also reviewed her labs.  Her hemoglobin is stable at 8.6.    Plan: We will clamp the nasogastric tube.  We will get her up in a chair today.  I will also increase the dose of the Dilaudid on her PCA as her activity level is increasing today.  We will maintain IMC status but transferred her to station 33 surgical floor.

## 2018-06-30 NOTE — PLAN OF CARE
Problem: Patient Care Overview  Goal: Plan of Care/Patient Progress Review  Patient had some difficulty with pain control throughout the night. Patient used dilaudid pca regularly. VSS respirations shallow due to pain, but lungs clear on 4L NC. Patient alert and oriented x 4. NG had 150 mL of output from NG including ice chips she had consumed. Patient still having some coffee ground output from NG, but much less and Hgb increased this morning. Patient updated on POC will continue to monitor.

## 2018-07-01 LAB
BASOPHILS # BLD AUTO: 0 10E9/L (ref 0–0.2)
BASOPHILS NFR BLD AUTO: 0.2 %
BUN SERPL-MCNC: 13 MG/DL (ref 7–30)
CREAT SERPL-MCNC: 0.93 MG/DL (ref 0.52–1.04)
DIFFERENTIAL METHOD BLD: ABNORMAL
EOSINOPHIL # BLD AUTO: 0.1 10E9/L (ref 0–0.7)
EOSINOPHIL NFR BLD AUTO: 0.8 %
ERYTHROCYTE [DISTWIDTH] IN BLOOD BY AUTOMATED COUNT: 16.7 % (ref 10–15)
GFR SERPL CREATININE-BSD FRML MDRD: 59 ML/MIN/1.7M2
GLUCOSE BLDC GLUCOMTR-MCNC: 107 MG/DL (ref 70–99)
GLUCOSE BLDC GLUCOMTR-MCNC: 77 MG/DL (ref 70–99)
GLUCOSE BLDC GLUCOMTR-MCNC: 79 MG/DL (ref 70–99)
GLUCOSE BLDC GLUCOMTR-MCNC: 80 MG/DL (ref 70–99)
GLUCOSE BLDC GLUCOMTR-MCNC: 83 MG/DL (ref 70–99)
GLUCOSE BLDC GLUCOMTR-MCNC: 84 MG/DL (ref 70–99)
GLUCOSE BLDC GLUCOMTR-MCNC: 84 MG/DL (ref 70–99)
GLUCOSE BLDC GLUCOMTR-MCNC: 85 MG/DL (ref 70–99)
GLUCOSE SERPL-MCNC: 84 MG/DL (ref 70–99)
HCT VFR BLD AUTO: 26.1 % (ref 35–47)
HGB BLD-MCNC: 8.4 G/DL (ref 11.7–15.7)
IMM GRANULOCYTES # BLD: 0 10E9/L (ref 0–0.4)
IMM GRANULOCYTES NFR BLD: 0.5 %
INR PPP: 1.28 (ref 0.86–1.14)
LYMPHOCYTES # BLD AUTO: 0.7 10E9/L (ref 0.8–5.3)
LYMPHOCYTES NFR BLD AUTO: 12.3 %
MCH RBC QN AUTO: 28.1 PG (ref 26.5–33)
MCHC RBC AUTO-ENTMCNC: 32.2 G/DL (ref 31.5–36.5)
MCV RBC AUTO: 87 FL (ref 78–100)
MONOCYTES # BLD AUTO: 0.4 10E9/L (ref 0–1.3)
MONOCYTES NFR BLD AUTO: 6.6 %
NEUTROPHILS # BLD AUTO: 4.7 10E9/L (ref 1.6–8.3)
NEUTROPHILS NFR BLD AUTO: 79.6 %
NRBC # BLD AUTO: 0 10*3/UL
NRBC BLD AUTO-RTO: 0 /100
PLATELET # BLD AUTO: 152 10E9/L (ref 150–450)
POTASSIUM SERPL-SCNC: 3.7 MMOL/L (ref 3.4–5.3)
RBC # BLD AUTO: 2.99 10E12/L (ref 3.8–5.2)
SODIUM SERPL-SCNC: 140 MMOL/L (ref 133–144)
WBC # BLD AUTO: 5.9 10E9/L (ref 4–11)

## 2018-07-01 PROCEDURE — 25000125 ZZHC RX 250: Performed by: INTERNAL MEDICINE

## 2018-07-01 PROCEDURE — 12000007 ZZH R&B INTERMEDIATE

## 2018-07-01 PROCEDURE — 84295 ASSAY OF SERUM SODIUM: CPT | Performed by: SURGERY

## 2018-07-01 PROCEDURE — 25000125 ZZHC RX 250: Performed by: PHYSICIAN ASSISTANT

## 2018-07-01 PROCEDURE — 25000128 H RX IP 250 OP 636: Performed by: INTERNAL MEDICINE

## 2018-07-01 PROCEDURE — 25000128 H RX IP 250 OP 636: Performed by: SURGERY

## 2018-07-01 PROCEDURE — 40000886 ZZH STATISTIC STEP DOWN HRS DAY

## 2018-07-01 PROCEDURE — 99233 SBSQ HOSP IP/OBS HIGH 50: CPT | Performed by: INTERNAL MEDICINE

## 2018-07-01 PROCEDURE — 85025 COMPLETE CBC W/AUTO DIFF WBC: CPT | Performed by: SURGERY

## 2018-07-01 PROCEDURE — 82947 ASSAY GLUCOSE BLOOD QUANT: CPT | Performed by: SURGERY

## 2018-07-01 PROCEDURE — 36415 COLL VENOUS BLD VENIPUNCTURE: CPT | Performed by: SURGERY

## 2018-07-01 PROCEDURE — 82565 ASSAY OF CREATININE: CPT | Performed by: SURGERY

## 2018-07-01 PROCEDURE — 85610 PROTHROMBIN TIME: CPT | Performed by: SURGERY

## 2018-07-01 PROCEDURE — 25000132 ZZH RX MED GY IP 250 OP 250 PS 637: Performed by: SURGERY

## 2018-07-01 PROCEDURE — 84520 ASSAY OF UREA NITROGEN: CPT | Performed by: SURGERY

## 2018-07-01 PROCEDURE — 25000128 H RX IP 250 OP 636: Performed by: PHYSICIAN ASSISTANT

## 2018-07-01 PROCEDURE — 84132 ASSAY OF SERUM POTASSIUM: CPT | Performed by: SURGERY

## 2018-07-01 PROCEDURE — 00000146 ZZHCL STATISTIC GLUCOSE BY METER IP

## 2018-07-01 PROCEDURE — 25000132 ZZH RX MED GY IP 250 OP 250 PS 637: Performed by: PHYSICIAN ASSISTANT

## 2018-07-01 RX ORDER — ASPIRIN 325 MG
325 TABLET ORAL DAILY
Status: DISCONTINUED | OUTPATIENT
Start: 2018-07-01 | End: 2018-07-04 | Stop reason: HOSPADM

## 2018-07-01 RX ORDER — HEPARIN SODIUM 5000 [USP'U]/.5ML
5000 INJECTION, SOLUTION INTRAVENOUS; SUBCUTANEOUS EVERY 8 HOURS
Status: DISCONTINUED | OUTPATIENT
Start: 2018-07-01 | End: 2018-07-01

## 2018-07-01 RX ORDER — PANTOPRAZOLE SODIUM 40 MG/1
40 TABLET, DELAYED RELEASE ORAL EVERY MORNING
Status: DISCONTINUED | OUTPATIENT
Start: 2018-07-02 | End: 2018-07-04 | Stop reason: HOSPADM

## 2018-07-01 RX ORDER — SODIUM CHLORIDE 9 MG/ML
INJECTION, SOLUTION INTRAVENOUS CONTINUOUS
Status: DISCONTINUED | OUTPATIENT
Start: 2018-07-01 | End: 2018-07-03

## 2018-07-01 RX ADMIN — METOPROLOL TARTRATE 5 MG: 5 INJECTION, SOLUTION INTRAVENOUS at 20:31

## 2018-07-01 RX ADMIN — ASPIRIN 325 MG ORAL TABLET 325 MG: 325 PILL ORAL at 09:08

## 2018-07-01 RX ADMIN — METOPROLOL TARTRATE 5 MG: 5 INJECTION, SOLUTION INTRAVENOUS at 02:03

## 2018-07-01 RX ADMIN — ONDANSETRON 4 MG: 2 INJECTION INTRAMUSCULAR; INTRAVENOUS at 06:12

## 2018-07-01 RX ADMIN — PANTOPRAZOLE SODIUM 40 MG: 40 INJECTION, POWDER, FOR SOLUTION INTRAVENOUS at 09:08

## 2018-07-01 RX ADMIN — HEPARIN SODIUM 5000 UNITS: 5000 INJECTION, SOLUTION INTRAVENOUS; SUBCUTANEOUS at 09:08

## 2018-07-01 RX ADMIN — ONDANSETRON 4 MG: 2 INJECTION INTRAMUSCULAR; INTRAVENOUS at 14:43

## 2018-07-01 RX ADMIN — ENOXAPARIN SODIUM 40 MG: 40 INJECTION SUBCUTANEOUS at 20:31

## 2018-07-01 RX ADMIN — ACETAMINOPHEN 975 MG: 325 TABLET, FILM COATED ORAL at 14:21

## 2018-07-01 RX ADMIN — Medication: at 21:35

## 2018-07-01 RX ADMIN — METOPROLOL TARTRATE 5 MG: 5 INJECTION, SOLUTION INTRAVENOUS at 14:21

## 2018-07-01 RX ADMIN — METOPROLOL TARTRATE 5 MG: 5 INJECTION, SOLUTION INTRAVENOUS at 09:08

## 2018-07-01 RX ADMIN — SODIUM CHLORIDE: 9 INJECTION, SOLUTION INTRAVENOUS at 06:12

## 2018-07-01 NOTE — PROGRESS NOTES
[facility]    Vascular Medicine Progress Note    Date of Service (when I saw the patient): 07/01/2018        Patient care time spent 35 minutes today      Assessment & Plan      Trish Wu is a 73 year old female who was admitted on 6/28/2018.     1. Asymptomatic Juxta renal  4.3 cm AAA with known occlusion of proximal bilateral iliac arteries and severely diseased right SFA now with critical limb ischemia of rest pain     S/p  Repair of juxtarenal abdominal aortic aneurysm with aorta bilateral femoral bypass with 18 x 9 mm Dacron graft   On 6/28/2018    POD #3   VSS  HGB stable, 8.5,8.4  NG out yesterday  On PCA for pain control  Hx of bleeding gastric ulcer many years ago, started IV protonix 40 daily  BP improved 110-120 systolic  Insulin switched to SS, sugars good range  Abd: soft  Feet are warm and well perfused , good pulses.  Good UOP creat normal  RT  foot is weak( per pt she had this before ?) unable to do dorsiflexion, she was able to stand up  today     Plan:    Post op cares and activity per vas surgery, ambulate  Change protonix  to 40 mg  Po daily starting tomorrow  and discharge with this medication for a month or more ( she developed coffee ground emesis /NG out put while in ICU)  PT to see her today, monitor Rt foot   Continue PCA for one more day  IS  DVT prophylaxis  Discussed with nursing staff.    2. Hyperlipidemia     Her lipids are presently well controlled (LDL < 70). She should continue same.  3. Hypertension     Currently improving  hypotension ,  restart once BP improves and able to take po     4. Anemia     She has been noted to have a hemoglobin around 8.5 pre-operatively. She was recently initiated on iron supplementation. Her hemoglobin will need to be monitored closely given the expected blood loss with her surgical procedure. Blood can be given as needed. An etiology for her anemia has yet to be found. She admits to a prior history of a bleeding gastric ulcer,  but denies any bloody or black stools. She states her last colonoscopy was a very long time ago. No records for one are available in her chart. It is recommended that she undergo a colonoscopy once she recovers from her surgery.    Monitor serial HGB  Avoid NSAIDS   protonix 40 mg daily       Patient care time spent 35 minutes today       Evan Pittman MD,[facility]  Vascular Medicine    Interval History   Rt foot is weak but able to stand.  BP improved  HGb8.4  Creat normal  Good UOP  BS are good  NG out  On PCA for pain control    Physical Exam   Temp: 99.1  F (37.3  C) Temp src: Oral BP: 122/64   Heart Rate: 89 Resp: 16 SpO2: 96 % O2 Device: Nasal cannula Oxygen Delivery: 1/2 LPM  Vitals:    06/28/18 2020 06/30/18 0600 07/01/18 0651   Weight: 62.7 kg (138 lb 3.2 oz) 61 kg (134 lb 7.7 oz) 63.9 kg (140 lb 14 oz)     Vital Signs with Ranges  Temp:  [98  F (36.7  C)-99.1  F (37.3  C)] 99.1  F (37.3  C)  Heart Rate:  [76-97] 89  Resp:  [11-20] 16  BP: (113-128)/(55-69) 122/64  SpO2:  [95 %-99 %] 96 %  I/O last 3 completed shifts:  In: 3085 [P.O.:60; I.V.:3025]  Out: 1570 [Urine:1570]    Constitutional: Awake, alert, cooperative, tired with ongoing nausea  Eyes: Lids and lashes normal, pupils equal, round and reactive to light, extra ocular muscles intact, sclera clear, conjunctiva normal.  ENT: Normocephalic, without obvious abnormality NG with LIS  Respiratory: No increased work of breathing, good air exchange, clear to auscultation bilaterally, no crackles or wheezing.  Cardiovascular: Normal apical impulse, regular rate and rhythm, normal S1 and S2, no S3 or S4, and no murmur noted.  GI:  Positive BS, staples intact, surgical site looks good.  Lymph/Hematologic: No cervical lymphadenopathy and no supraclavicular lymphadenopathy.  Genitourinary:    Sanders catheter in place  Skin: NAD, Feet are warm, well perfused  Musculoskeletal: No edema   Neurologic: Awake, alert, oriented to name, place and time.   Cranial nerves II-XII are grossly intact.   Neuropsychiatric: tired, resting    Pulses:  Good pedal pulse, biphasic dopplerable DP and PT bilateral, feet well perfused.    Medications     IV fluid REPLACEMENT ONLY       HYDROmorphone       sodium chloride         acetaminophen  975 mg Oral Q8H     aspirin  325 mg Oral Daily     heparin  5,000 Units Subcutaneous Q8H     insulin aspart  1-4 Units Subcutaneous Q4H     metoprolol  5 mg Intravenous Q6H     pantoprazole (PROTONIX) IV  40 mg Intravenous Daily with breakfast     sodium chloride (PF)  3 mL Intracatheter Q8H       Data     Recent Labs  Lab 07/01/18  0735 06/30/18  1140 06/30/18  0400  06/29/18  1000 06/29/18  0350   WBC 5.9  --  5.9  --  5.4 6.7   HGB 8.4* 8.5* 8.5*  < > 7.7* 9.0*   MCV 87  --  86  --  84 83     --  158  --  262 218   INR 1.28*  --  1.17*  --   --  1.09     --  138  --   --  136   POTASSIUM 3.7  --  3.7  --   --  4.0   CHLORIDE  --   --  109  --   --   --    CO2  --   --  21  --   --   --    BUN 13  --  13  --   --  12   CR 0.93  --  0.96  --   --  1.01   ANIONGAP  --   --  8  --   --   --    SHAHAB  --   --  6.7*  --   --   --    GLC 84  --  104*  --   --  126*   ALBUMIN  --   --  2.3*  --   --   --    PROTTOTAL  --   --  5.4*  --   --   --    BILITOTAL  --   --  0.4  --   --   --    ALKPHOS  --   --  59  --   --   --    ALT  --   --  38  --   --   --    AST  --   --  71*  --   --   --    < > = values in this interval not displayed.

## 2018-07-01 NOTE — PLAN OF CARE
Problem: Patient Care Overview  Goal: Plan of Care/Patient Progress Review  Outcome: Improving  A&O. Pain managed with PCA. Up with Ax1. Midline with scant amount of serous drainage. Bilateral groin sites CDI. Pulses palpable. R foot absent of dorsiflexion Abd binder in place. C/o nausea, relieved with zofran. +flatus. Sanders removed, voiding.

## 2018-07-01 NOTE — PROGRESS NOTES
Mrs. Wu is postoperative day 3 from aortobifemoral bypass.  The nasogastric tube was taken out yesterday and she has tolerated that well.  She also reports some weakness in the right lower extremity.  She was able to stand up today.  Her vital signs have been stable.  And urine output is good.  Her surgical dressing was removed and in the midline abdominal incision there is an area of duskiness.  Groin incisions look good.  Both feet are warm and well perfused.    Labs were reviewed and hemoglobin stable.    Plan for today would be to increase activity level with physical therapy.  We will also start oral pain medication and discontinue her Sanders catheter.  Dry dressings to abdominal and groin incision.

## 2018-07-01 NOTE — PROGRESS NOTES
Hospitalist Note:    [facility] is covering the patient today. We will cross cover after 3 pm.    Osei Juarez MD

## 2018-07-01 NOTE — PLAN OF CARE
Problem: Patient Care Overview  Goal: Plan of Care/Patient Progress Review  Outcome: No Change  A&O. VSS. 1/2 L NC, desats on room air while sleeping. NGT dc'd. Denies nausea. NPO except meds and ice chips. Denies flatus, but also denies feeling full or bloated. No abdominal distention. Dressings intact and dry with dried drainage present. Bilateral +2 edema to feet. Right foot weaker than the left. Also complaints of continued neuropathy to her right foot. PCA Dilaudid managing pain.

## 2018-07-02 ENCOUNTER — APPOINTMENT (OUTPATIENT)
Dept: PHYSICAL THERAPY | Facility: CLINIC | Age: 73
DRG: 269 | End: 2018-07-02
Attending: SURGERY
Payer: COMMERCIAL

## 2018-07-02 LAB
BASOPHILS # BLD AUTO: 0 10E9/L (ref 0–0.2)
BASOPHILS NFR BLD AUTO: 0.5 %
BUN SERPL-MCNC: 13 MG/DL (ref 7–30)
CREAT SERPL-MCNC: 0.9 MG/DL (ref 0.52–1.04)
DIFFERENTIAL METHOD BLD: ABNORMAL
EOSINOPHIL # BLD AUTO: 0 10E9/L (ref 0–0.7)
EOSINOPHIL NFR BLD AUTO: 0.6 %
ERYTHROCYTE [DISTWIDTH] IN BLOOD BY AUTOMATED COUNT: 16.6 % (ref 10–15)
GFR SERPL CREATININE-BSD FRML MDRD: 62 ML/MIN/1.7M2
GLUCOSE BLDC GLUCOMTR-MCNC: 75 MG/DL (ref 70–99)
GLUCOSE BLDC GLUCOMTR-MCNC: 82 MG/DL (ref 70–99)
GLUCOSE SERPL-MCNC: 80 MG/DL (ref 70–99)
HCT VFR BLD AUTO: 26.1 % (ref 35–47)
HGB BLD-MCNC: 8.6 G/DL (ref 11.7–15.7)
IMM GRANULOCYTES # BLD: 0 10E9/L (ref 0–0.4)
IMM GRANULOCYTES NFR BLD: 0.5 %
INR PPP: 1.25 (ref 0.86–1.14)
LYMPHOCYTES # BLD AUTO: 1.1 10E9/L (ref 0.8–5.3)
LYMPHOCYTES NFR BLD AUTO: 18.2 %
MCH RBC QN AUTO: 28.1 PG (ref 26.5–33)
MCHC RBC AUTO-ENTMCNC: 33 G/DL (ref 31.5–36.5)
MCV RBC AUTO: 85 FL (ref 78–100)
MONOCYTES # BLD AUTO: 0.4 10E9/L (ref 0–1.3)
MONOCYTES NFR BLD AUTO: 6.9 %
NEUTROPHILS # BLD AUTO: 4.6 10E9/L (ref 1.6–8.3)
NEUTROPHILS NFR BLD AUTO: 73.3 %
NRBC # BLD AUTO: 0 10*3/UL
NRBC BLD AUTO-RTO: 0 /100
PLATELET # BLD AUTO: 192 10E9/L (ref 150–450)
POTASSIUM SERPL-SCNC: 3 MMOL/L (ref 3.4–5.3)
POTASSIUM SERPL-SCNC: 3.1 MMOL/L (ref 3.4–5.3)
RBC # BLD AUTO: 3.06 10E12/L (ref 3.8–5.2)
SODIUM SERPL-SCNC: 138 MMOL/L (ref 133–144)
WBC # BLD AUTO: 6.3 10E9/L (ref 4–11)

## 2018-07-02 PROCEDURE — 36415 COLL VENOUS BLD VENIPUNCTURE: CPT | Performed by: PHYSICIAN ASSISTANT

## 2018-07-02 PROCEDURE — 40000193 ZZH STATISTIC PT WARD VISIT

## 2018-07-02 PROCEDURE — 12000007 ZZH R&B INTERMEDIATE

## 2018-07-02 PROCEDURE — 84520 ASSAY OF UREA NITROGEN: CPT | Performed by: SURGERY

## 2018-07-02 PROCEDURE — 00000146 ZZHCL STATISTIC GLUCOSE BY METER IP

## 2018-07-02 PROCEDURE — 97161 PT EVAL LOW COMPLEX 20 MIN: CPT | Mod: GP

## 2018-07-02 PROCEDURE — 82565 ASSAY OF CREATININE: CPT | Performed by: SURGERY

## 2018-07-02 PROCEDURE — 84132 ASSAY OF SERUM POTASSIUM: CPT | Performed by: PHYSICIAN ASSISTANT

## 2018-07-02 PROCEDURE — 25000132 ZZH RX MED GY IP 250 OP 250 PS 637: Performed by: INTERNAL MEDICINE

## 2018-07-02 PROCEDURE — 25000128 H RX IP 250 OP 636: Performed by: INTERNAL MEDICINE

## 2018-07-02 PROCEDURE — 25000125 ZZHC RX 250: Performed by: PHYSICIAN ASSISTANT

## 2018-07-02 PROCEDURE — 82947 ASSAY GLUCOSE BLOOD QUANT: CPT | Performed by: SURGERY

## 2018-07-02 PROCEDURE — 85610 PROTHROMBIN TIME: CPT | Performed by: SURGERY

## 2018-07-02 PROCEDURE — 84295 ASSAY OF SERUM SODIUM: CPT | Performed by: SURGERY

## 2018-07-02 PROCEDURE — 36415 COLL VENOUS BLD VENIPUNCTURE: CPT | Performed by: SURGERY

## 2018-07-02 PROCEDURE — 84132 ASSAY OF SERUM POTASSIUM: CPT | Performed by: SURGERY

## 2018-07-02 PROCEDURE — 97530 THERAPEUTIC ACTIVITIES: CPT | Mod: GP

## 2018-07-02 PROCEDURE — 85025 COMPLETE CBC W/AUTO DIFF WBC: CPT | Performed by: SURGERY

## 2018-07-02 PROCEDURE — 99233 SBSQ HOSP IP/OBS HIGH 50: CPT | Performed by: INTERNAL MEDICINE

## 2018-07-02 PROCEDURE — 25000132 ZZH RX MED GY IP 250 OP 250 PS 637: Performed by: SURGERY

## 2018-07-02 RX ORDER — POTASSIUM CHLORIDE 1500 MG/1
20-40 TABLET, EXTENDED RELEASE ORAL
Status: DISCONTINUED | OUTPATIENT
Start: 2018-07-02 | End: 2018-07-04 | Stop reason: HOSPADM

## 2018-07-02 RX ORDER — HYDROMORPHONE HYDROCHLORIDE 1 MG/ML
.3-.5 INJECTION, SOLUTION INTRAMUSCULAR; INTRAVENOUS; SUBCUTANEOUS
Status: DISCONTINUED | OUTPATIENT
Start: 2018-07-02 | End: 2018-07-04 | Stop reason: HOSPADM

## 2018-07-02 RX ORDER — POTASSIUM CL/LIDO/0.9 % NACL 10MEQ/0.1L
10 INTRAVENOUS SOLUTION, PIGGYBACK (ML) INTRAVENOUS
Status: DISCONTINUED | OUTPATIENT
Start: 2018-07-02 | End: 2018-07-02

## 2018-07-02 RX ORDER — POTASSIUM CHLORIDE 7.45 MG/ML
10 INJECTION INTRAVENOUS
Status: DISCONTINUED | OUTPATIENT
Start: 2018-07-02 | End: 2018-07-04 | Stop reason: HOSPADM

## 2018-07-02 RX ORDER — POTASSIUM CHLORIDE 29.8 MG/ML
20 INJECTION INTRAVENOUS
Status: DISCONTINUED | OUTPATIENT
Start: 2018-07-02 | End: 2018-07-02

## 2018-07-02 RX ORDER — POTASSIUM CHLORIDE 1.5 G/1.58G
20-40 POWDER, FOR SOLUTION ORAL
Status: DISCONTINUED | OUTPATIENT
Start: 2018-07-02 | End: 2018-07-02

## 2018-07-02 RX ORDER — POTASSIUM CHLORIDE 1500 MG/1
20-40 TABLET, EXTENDED RELEASE ORAL
Status: DISCONTINUED | OUTPATIENT
Start: 2018-07-02 | End: 2018-07-02

## 2018-07-02 RX ORDER — POTASSIUM CHLORIDE 7.45 MG/ML
10 INJECTION INTRAVENOUS
Status: DISCONTINUED | OUTPATIENT
Start: 2018-07-02 | End: 2018-07-02

## 2018-07-02 RX ORDER — FUROSEMIDE 10 MG/ML
20 INJECTION INTRAMUSCULAR; INTRAVENOUS EVERY 8 HOURS
Status: DISCONTINUED | OUTPATIENT
Start: 2018-07-02 | End: 2018-07-03

## 2018-07-02 RX ORDER — POTASSIUM CL/LIDO/0.9 % NACL 10MEQ/0.1L
10 INTRAVENOUS SOLUTION, PIGGYBACK (ML) INTRAVENOUS
Status: DISCONTINUED | OUTPATIENT
Start: 2018-07-02 | End: 2018-07-04 | Stop reason: HOSPADM

## 2018-07-02 RX ORDER — LISINOPRIL 10 MG/1
20 TABLET ORAL DAILY
Status: DISCONTINUED | OUTPATIENT
Start: 2018-07-02 | End: 2018-07-04 | Stop reason: HOSPADM

## 2018-07-02 RX ORDER — ATORVASTATIN CALCIUM 20 MG/1
20 TABLET, FILM COATED ORAL EVERY EVENING
Status: DISCONTINUED | OUTPATIENT
Start: 2018-07-02 | End: 2018-07-04 | Stop reason: HOSPADM

## 2018-07-02 RX ORDER — POTASSIUM CHLORIDE 29.8 MG/ML
20 INJECTION INTRAVENOUS
Status: DISCONTINUED | OUTPATIENT
Start: 2018-07-02 | End: 2018-07-04 | Stop reason: HOSPADM

## 2018-07-02 RX ORDER — POTASSIUM CHLORIDE 1.5 G/1.58G
20-40 POWDER, FOR SOLUTION ORAL
Status: DISCONTINUED | OUTPATIENT
Start: 2018-07-02 | End: 2018-07-04 | Stop reason: HOSPADM

## 2018-07-02 RX ADMIN — HYDROMORPHONE HYDROCHLORIDE 0.5 MG: 1 INJECTION, SOLUTION INTRAMUSCULAR; INTRAVENOUS; SUBCUTANEOUS at 19:00

## 2018-07-02 RX ADMIN — METOPROLOL TARTRATE 5 MG: 5 INJECTION, SOLUTION INTRAVENOUS at 13:27

## 2018-07-02 RX ADMIN — HYDROMORPHONE HYDROCHLORIDE 0.5 MG: 1 INJECTION, SOLUTION INTRAMUSCULAR; INTRAVENOUS; SUBCUTANEOUS at 16:31

## 2018-07-02 RX ADMIN — PANTOPRAZOLE SODIUM 40 MG: 40 TABLET, DELAYED RELEASE ORAL at 08:01

## 2018-07-02 RX ADMIN — HYDROMORPHONE HYDROCHLORIDE 0.5 MG: 1 INJECTION, SOLUTION INTRAMUSCULAR; INTRAVENOUS; SUBCUTANEOUS at 20:12

## 2018-07-02 RX ADMIN — LISINOPRIL 20 MG: 10 TABLET ORAL at 12:15

## 2018-07-02 RX ADMIN — ASPIRIN 325 MG ORAL TABLET 325 MG: 325 PILL ORAL at 08:01

## 2018-07-02 RX ADMIN — METOPROLOL TARTRATE 5 MG: 5 INJECTION, SOLUTION INTRAVENOUS at 20:12

## 2018-07-02 RX ADMIN — HYDROMORPHONE HYDROCHLORIDE 0.5 MG: 1 INJECTION, SOLUTION INTRAMUSCULAR; INTRAVENOUS; SUBCUTANEOUS at 23:00

## 2018-07-02 RX ADMIN — FUROSEMIDE 20 MG: 10 INJECTION, SOLUTION INTRAMUSCULAR; INTRAVENOUS at 12:15

## 2018-07-02 RX ADMIN — ENOXAPARIN SODIUM 40 MG: 40 INJECTION SUBCUTANEOUS at 20:12

## 2018-07-02 RX ADMIN — Medication 10 MEQ: at 18:17

## 2018-07-02 RX ADMIN — ATORVASTATIN CALCIUM 20 MG: 20 TABLET, FILM COATED ORAL at 20:12

## 2018-07-02 RX ADMIN — Medication 10 MEQ: at 13:30

## 2018-07-02 RX ADMIN — METOPROLOL TARTRATE 5 MG: 5 INJECTION, SOLUTION INTRAVENOUS at 03:11

## 2018-07-02 RX ADMIN — METOPROLOL TARTRATE 5 MG: 5 INJECTION, SOLUTION INTRAVENOUS at 07:57

## 2018-07-02 RX ADMIN — Medication 10 MEQ: at 14:43

## 2018-07-02 RX ADMIN — Medication 10 MEQ: at 12:15

## 2018-07-02 NOTE — PLAN OF CARE
Problem: Patient Care Overview  Goal: Plan of Care/Patient Progress Review  Outcome: Improving  AOx4, VSS on RA, LS clear but shallow breathing pattern diminishes sounds in bases, +Flatus but due to BM.  Patient states weakness and slight numbness on right lower extremity foot.  +2 pulses via palpation on all extremities.  Voiding WNL.  Patient has +1 edema in feet, +2 in thighs and lower torso.  Abdominal incision MOY and CDI with dressing in place, BLE groin incisions MOY with clean dressings, both dressings changed during day shift.  Patient ambulates well with +1 assist.  Clear liquid diet tolerated fairly, patient states she does not want to get nauseated from eating.

## 2018-07-02 NOTE — PROGRESS NOTES
HOSPITALIST CONSULT CHART CHECK:      Hospitalist service was consulted for cross coverage only. We will peripherally follow and chart check throughout the week.        - For vascular medical concerns during business hours M-F, call the [facility] at [phone] to have the rounding/on call Vascular Medicine (NOT VASCULAR SURGERY) MD paged.      - After business hours M-F, for medical concerns on this patient, please page hospitalist staff.      - For vascular surgical questions, please page the appropriate surgeon (primary vascular surgeon or on call vascular surgeon) based upon the time of day.       Donta Valencia PA-C  Hospitalist Physician Assistant

## 2018-07-02 NOTE — PROGRESS NOTES
07/02/18 1121   Quick Adds   Type of Visit Initial PT Evaluation   Living Environment   Lives With child(ashley), adult  (son and daughter in law)   Living Arrangements house  (split level house, house is a rental)   Home Accessibility stairs (1 railing present);stairs to enter home;stairs within home   Number of Stairs to Enter Home 8   Number of Stairs Within Home 8   Transportation Available family or friend will provide  (pt reports she does not drive)   Living Environment Comment Pt reports daughter in law present 24/7 other than when running kids around or her own health issues.  Son works full time, also travels for work.  Tub shower, no bench/chair but reports planning to get one.  Has been living with son and daughter in law x 2 years.   Self-Care   Usual Activity Tolerance moderate   Current Activity Tolerance poor   Equipment Currently Used at Home none   Activity/Exercise/Self-Care Comment Reports son and daughter in law trying to have pt use a cane due to R foot weakness, pt did not feel it was helpful or safe.  Pt states recently grandson has been doing laundry due to being in basement. Manages medication, gets dressed on own.   Functional Level Prior   Ambulation 0-->independent   Transferring 0-->independent   Toileting 0-->independent   Bathing 0-->independent   Dressing 0-->independent   Eating 0-->independent   Which of the above functional risks had a recent onset or change? ambulation;transferring   Prior Functional Level Comment Up to one month prior to surgery mobility limited by weakness and altered sensation R foot.     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   Precautions/Limitations  fall precautions;abdominal precautions   General Info Comments Pt reports weight up 15# today, vascular med in and reports plan for diuresis    Cognitive Status Examination   Orientation orientation to person, place and time   Level of Consciousness alert   Follows Commands and Answers Questions 100% of the time;able to follow multistep instructions   Personal Safety and Judgment intact   Memory intact   Cognitive Comment no concerns identified   Pain Assessment   Patient Currently in Pain Yes, see Vital Sign flowsheet  (R great toe)   Integumentary/Edema   Integumentary/Edema Comments 1+ edema in dorsum of feet saranya, slightly more RLE vs LLE, pt notes weight up today   Posture    Posture Not impaired   Range of Motion (ROM)   ROM Comment PROM ankle DF to neutral, AROM limited by strength,  UE and LLE WNL,  Hip flexion AROM slightly limited post op.   Strength   Strength Comments R ankle DF 1/5.  Resistive MMT not completed at hip/knee post op, demos at least antigravity.  LLE and UE WNL.   Bed Mobility Skill: Supine to Sit   Level of Kimble: Supine/Sit minimum assist (75% patients effort)   Physical Assist/Nonphysical Assist: Supine/Sit verbal cues;nonverbal cues (demo/gestures);1 person assist   Transfer Skill:  Sit to Stand   Level of Kimble: Sit/Stand contact guard   Physical Assist/Nonphysical Assist: Sit/Stand verbal cues;nonverbal cues (demo/gestures);1 person assist   Assistive Device for Transfer: Sit/Stand rolling walker   Transfer Safety Analysis Sit to Stand   Transfer Safety Concerns Noted: Sit/Stand decreased weight-shifting ability   Impairments Contributing to Impaired Transfers: Sit to Stand pain;impaired sensory feedback;decreased strength   Transfer Skill: Stand to Sit   Level of Kimble: Stand/Sit contact guard   Physical Assist/Nonphysical Assist: Stand/Sit verbal cues;nonverbal cues (demo/gestures);1 person assist   Weight-Bearing Restrictions: Stand/Sit weight-bearing as  tolerated   Assistive Device: Stand to Sit rolling walker   Gait   Gait Comments Pt instructed in gait sequencing with walker, see daily doc for additional gait training   Gait Skills   Level of Leigh: Gait contact guard   Physical Assist/Nonphysical Assist: Gait verbal cues;nonverbal cues (demo/gestures);1 person assist   Assistive Device for Transfer: Gait rolling walker   Gait Distance (20')   Gait Analysis   Gait Pattern Used 3-point gait   Gait Deviations Noted decreased osmar;decreased step length;other (see comments)  (increased R hip and knee flexion to clear R foot)   Impairments Contributing to Gait Deviations decreased strength  (decreased R ankle DF)   Balance   Balance Comments Good with saranya UE support of walker, impaired from baseline of no device   Sensory Examination   Sensory Perception Comments Pt reports ankle distally, especially toes, extremely sensitive.  Reports foot feels extremely swollen, tight.  Intact to therapist light touch, very painful.   General Therapy Interventions   Planned Therapy Interventions balance training;gait training;ROM;strengthening;transfer training;home program guidelines;progressive activity/exercise   Clinical Impression   Criteria for Skilled Therapeutic Intervention yes, treatment indicated   PT Diagnosis difficulty in gait   Influenced by the following impairments decreased R ankle strength, decreased R foot sensation, decreased activity tolerance   Functional limitations due to impairments decreased I with bed mobility, transfers, gait for home and community mobility   Clinical Presentation Stable/Uncomplicated   Clinical Presentation Rationale progressing post op, R foot consistent with pre op per patient   Clinical Decision Making (Complexity) Low complexity   Therapy Frequency` daily   Predicted Duration of Therapy Intervention (days/wks) 7 days   Anticipated Equipment Needs at Discharge walker  (pt will check if walker available at home)  "  Anticipated Discharge Disposition Home with Assist;Home with Outpatient Therapy  (family assist for IADLs, OP PT to progress mobility)   Risk & Benefits of therapy have been explained Yes   Patient, Family & other staff in agreement with plan of care Yes   [facility] AM-PAC  \"6 Clicks\" V.2 Basic Mobility Inpatient Short Form   1. Turning from your back to your side while in a flat bed without using bedrails? 4 - None   2. Moving from lying on your back to sitting on the side of a flat bed without using bedrails? 3 - A Little   3. Moving to and from a bed to a chair (including a wheelchair)? 3 - A Little   4. Standing up from a chair using your arms (e.g., wheelchair, or bedside chair)? 3 - A Little   5. To walk in hospital room? 3 - A Little   6. Climbing 3-5 steps with a railing? 2 - A Lot   Basic Mobility Raw Score (Score out of 24.Lower scores equate to lower levels of function) 18   Total Evaluation Time   Total Evaluation Time (Minutes) 25     "

## 2018-07-02 NOTE — PROGRESS NOTES
[facility]  Vascular Medicine Progress Note          Assessment and Plan:       1. 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    Good UOP creat normal    RT foot weakness at baseline unable to do dorsiflexion    Wt up 8 kg since admit     Plan:     PT / OT    Advance diet    Diurese    Change protonix  to 40 mg  Po daily starting tomorrow  and discharge with this medication for a month or more ( she developed coffee ground emesis /NG out put while in ICU)      Change PCA to prn IV / PO     DVT prophylaxis         2. Hyperlipidemia      Her lipids are presently well controlled (LDL < 70). She should continue same.    3. Hypertension      Currently resolved  hypotension ,  restart home meds once BP improves and able to take po      4.Fe deficiency Anemia      No active bleeding presently    Monitor serial HGB    Avoid NSAIDS    Protonix 40 mg PO BID    Needs EGD, colonosocpy as outpt        Patient care time spent 35 minutes today               Interval History:   doing well; no cp, sob, n/v/d, or abd pain.              Review of Systems:   The 10 point Review of Systems is negative other than noted in the HPI             Medications:     Prescriptions Prior to Admission   Medication Sig Dispense Refill Last Dose     ASPIRIN EC PO Take 81 mg by mouth daily   6/21/2018 at AM     atorvastatin (LIPITOR) 20 MG tablet Take 1 tablet (20 mg) by mouth daily (Patient taking differently: Take 20 mg by mouth every evening ) 90 tablet 3 6/27/2018 at PM     Calcium Carb-Cholecalciferol  (CALCIUM 500 +D) 500-400 MG-UNIT TABS Take 1 tablet by mouth 2 times daily   6/27/2018 at PM     Ferrous Sulfate (IRON SUPPLEMENT PO) Take 1 tablet by mouth 2 times daily   6/27/2018 at 1800     gabapentin (NEURONTIN) 300 MG capsule Take 1 capsule (300 mg) by mouth 3 times daily 90 capsule 3 6/27/2018 at PM     lisinopril (PRINIVIL/ZESTRIL) 20 MG tablet Take 1 tablet (20 mg) by mouth daily 90 tablet 1 6/27/2018 at AM     oxyCODONE-acetaminophen (PERCOCET) 5-325 MG per tablet Take 1 tablet by mouth every 6 hours as needed for severe pain 40 tablet 0 6/27/2018 at PM       aspirin  325 mg Oral Daily     enoxaparin  40 mg Subcutaneous Q24H     insulin aspart  1-4 Units Subcutaneous Q4H     metoprolol  5 mg Intravenous Q6H     pantoprazole  40 mg Oral QAM     sodium chloride (PF)  3 mL Intracatheter Q8H                  Physical Exam:     Patient Vitals for the past 24 hrs:   BP Temp Temp src Pulse Heart Rate Resp SpO2 Weight   07/02/18 0758 144/68 98.7  F (37.1  C) - - 82 16 94 % -   07/02/18 0516 - - - - - - - 68.5 kg (151 lb 0.2 oz)   07/02/18 0315 135/66 98.6  F (37  C) Oral - 83 16 94 % -   07/01/18 2345 126/65 98.1  F (36.7  C) Axillary - 85 16 96 % -   07/01/18 2030 140/69 98.8  F (37.1  C) Oral - 85 16 95 % -   07/01/18 1947 130/67 98.5  F (36.9  C) Oral - 88 16 95 % -   07/01/18 1535 129/65 98.7  F (37.1  C) Oral 83 - 14 95 % -   07/01/18 1417 141/74 - - - 90 14 96 % -     Wt Readings from Last 4 Encounters:   07/02/18 68.5 kg (151 lb 0.2 oz)   06/13/18 63 kg (139 lb)   06/11/18 63.5 kg (140 lb)   05/30/18 61.7 kg (136 lb)       Intake/Output Summary (Last 24 hours) at 07/02/18 1157  Last data filed at 07/02/18 0618   Gross per 24 hour   Intake             1178 ml   Output              750 ml   Net              428 ml     Constitutional: normal  Eyes: normal  ENT: normal  Neck: Supple, symmetrical, trachea midline, no adenopathy, thyroid symmetric, not enlarged and no tenderness, skin normal.  Hematologic /  Lymphatic: normal  Back: normal  Lungs: No increased work of breathing, good air exchange, clear to auscultation bilaterally, no crackles or wheezing.  Cardiovascular: Regular rate and rhythm, normal S1 and S2, no S3 or S4, and no murmur noted.  Abdomen: decreased BS, soft  Musculoskeletal: incisions CDI  Neurologic: normal  Neuropsychiatric: normal  Skin: normal  triphasic DP and PT pulses bilaterally           Data:     Results for orders placed or performed during the hospital encounter of 06/28/18 (from the past 24 hour(s))   Glucose by meter   Result Value Ref Range    Glucose 77 70 - 99 mg/dL   Glucose by meter   Result Value Ref Range    Glucose 85 70 - 99 mg/dL   Glucose by meter   Result Value Ref Range    Glucose 83 70 - 99 mg/dL   Glucose by meter   Result Value Ref Range    Glucose 84 70 - 99 mg/dL   Glucose by meter   Result Value Ref Range    Glucose 75 70 - 99 mg/dL   CBC with platelets differential   Result Value Ref Range    WBC 6.3 4.0 - 11.0 10e9/L    RBC Count 3.06 (L) 3.8 - 5.2 10e12/L    Hemoglobin 8.6 (L) 11.7 - 15.7 g/dL    Hematocrit 26.1 (L) 35.0 - 47.0 %    MCV 85 78 - 100 fl    MCH 28.1 26.5 - 33.0 pg    MCHC 33.0 31.5 - 36.5 g/dL    RDW 16.6 (H) 10.0 - 15.0 %    Platelet Count 192 150 - 450 10e9/L    Diff Method Automated Method     % Neutrophils 73.3 %    % Lymphocytes 18.2 %    % Monocytes 6.9 %    % Eosinophils 0.6 %    % Basophils 0.5 %    % Immature Granulocytes 0.5 %    Nucleated RBCs 0 0 /100    Absolute Neutrophil 4.6 1.6 - 8.3 10e9/L    Absolute Lymphocytes 1.1 0.8 - 5.3 10e9/L    Absolute Monocytes 0.4 0.0 - 1.3 10e9/L    Absolute Eosinophils 0.0 0.0 - 0.7 10e9/L    Absolute Basophils 0.0 0.0 - 0.2 10e9/L    Abs Immature Granulocytes 0.0 0 - 0.4 10e9/L    Absolute Nucleated RBC 0.0    Creatinine   Result Value Ref Range    Creatinine 0.90 0.52 - 1.04 mg/dL    GFR Estimate 62 >60 mL/min/1.7m2    GFR Estimate If Black 75 >60 mL/min/1.7m2   Glucose   Result Value Ref Range     Glucose 80 70 - 99 mg/dL   INR   Result Value Ref Range    INR 1.25 (H) 0.86 - 1.14   Potassium   Result Value Ref Range    Potassium 3.1 (L) 3.4 - 5.3 mmol/L   Sodium   Result Value Ref Range    Sodium 138 133 - 144 mmol/L   Urea nitrogen   Result Value Ref Range    Urea Nitrogen 13 7 - 30 mg/dL

## 2018-07-02 NOTE — PLAN OF CARE
Problem: Patient Care Overview  Goal: Plan of Care/Patient Progress Review  Outcome: Improving  A&O, VSS. Pain managed with PCA. Up with Ax1. Abdominal incision CDI. Bilateral groin site dressings changed d/t moderate drainage. Increased edema noted in valeria area and around upper LE. No change in edema in bilateral feet/ankles. Pulses palpable. R foot absent of dorsiflexion Abd binder in place. Pt denies nausea. +flatus. Pt voiding well.

## 2018-07-02 NOTE — PROGRESS NOTES
"Vascular Surgery Note    Stable AortoBiFem Bypass  POD4    -PT assessment today  -Dressing changes daily and PRN saturation  -Ok to shower  -Continue cares.  -Appreciate Vascular Medicine help.    Feels pretty good.  Tolerating diet better.  Passing flatus.  No BM yet.  Pain and sensitivity to Right bid toe and Rt arch.  Has not had gout in past.  Feels it is due to the swelling of her legs.    NAD, pleasant, A&O  /68 (BP Location: Right arm)  Pulse 83  Temp 98.7  F (37.1  C)  Resp 16  Ht 1.676 m (5' 6\")  Wt 68.5 kg (151 lb 0.2 oz)  SpO2 94%  BMI 24.37 kg/m2    Intake/Output Summary (Last 24 hours) at 07/02/18 1200  Last data filed at 07/02/18 1158   Gross per 24 hour   Intake             1181 ml   Output              750 ml   Net              431 ml     Abd: soft, NT, ND  Aidan groins: no hematoma.  More serous drainage from Lt vs Rt  Inc: mild SS drainage from all sights.  Dressing from yesterday and not saturated but Lt groin  Pulse: palpable pulses to Aidan DP  LE: mild edema.  Rt foot dry but no lesion or other abnormality to Rt big toe    WBC wnl  Hgb 8.6 stable  "

## 2018-07-02 NOTE — PLAN OF CARE
Problem: Patient Care Overview  Goal: Plan of Care/Patient Progress Review  PT: Romaine completed.  Pt is a 72 y/o fe s/p repair of juxtarenal AAA with aorto bifemoral bypass.  At baseline lives with son and daughter in law in a split level home, no AD for mobility thought reports last month children had encouraged her to use cane at night and for community mobility due to R foot weakness and decreased sensation.    Discharge Planner PT   Patient plan for discharge: home with daughter in law and son- reports son works, daughter in law mostly home 24/7 but has own health challenges  Current status: CGA-min A for bed mobility, CGA for transfers and amb 20' with walker.  R ankle DF weakness noted 1/5, altered sensation pt reports hypersensitive.  Pt states weakness and sensation in R foot consistent with how it's felt about the last month prior to surgery.  Pain in great toe today, slight edema saranya.  Barriers to return to prior living situation: stairs to get anywhere in split level home- will address prior to disch  Recommendations for discharge: home with daughter in law assist for driving (same as prior), IADLs and ADLs as needed, OP PT to address gait, balance  Rationale for recommendations: decreased gait, balance due to R foot weakness and decreased sensation- would benefit from skilled OP PT to maximize I with mobility, decrease fall risk       Entered by: Brooklynn Rascon 07/02/2018 12:11 PM

## 2018-07-03 ENCOUNTER — APPOINTMENT (OUTPATIENT)
Dept: PHYSICAL THERAPY | Facility: CLINIC | Age: 73
DRG: 269 | End: 2018-07-03
Attending: SURGERY
Payer: COMMERCIAL

## 2018-07-03 LAB
BASOPHILS # BLD AUTO: 0 10E9/L (ref 0–0.2)
BASOPHILS NFR BLD AUTO: 0.4 %
CREAT SERPL-MCNC: 0.89 MG/DL (ref 0.52–1.04)
DIFFERENTIAL METHOD BLD: ABNORMAL
EOSINOPHIL # BLD AUTO: 0.1 10E9/L (ref 0–0.7)
EOSINOPHIL NFR BLD AUTO: 1.8 %
ERYTHROCYTE [DISTWIDTH] IN BLOOD BY AUTOMATED COUNT: 16.4 % (ref 10–15)
GFR SERPL CREATININE-BSD FRML MDRD: 62 ML/MIN/1.7M2
HCT VFR BLD AUTO: 28.3 % (ref 35–47)
HGB BLD-MCNC: 9.2 G/DL (ref 11.7–15.7)
IMM GRANULOCYTES # BLD: 0 10E9/L (ref 0–0.4)
IMM GRANULOCYTES NFR BLD: 0.6 %
INR PPP: 1.17 (ref 0.86–1.14)
LYMPHOCYTES # BLD AUTO: 1.7 10E9/L (ref 0.8–5.3)
LYMPHOCYTES NFR BLD AUTO: 25.9 %
MCH RBC QN AUTO: 27.4 PG (ref 26.5–33)
MCHC RBC AUTO-ENTMCNC: 32.5 G/DL (ref 31.5–36.5)
MCV RBC AUTO: 84 FL (ref 78–100)
MONOCYTES # BLD AUTO: 0.5 10E9/L (ref 0–1.3)
MONOCYTES NFR BLD AUTO: 6.7 %
NEUTROPHILS # BLD AUTO: 4.4 10E9/L (ref 1.6–8.3)
NEUTROPHILS NFR BLD AUTO: 64.6 %
NRBC # BLD AUTO: 0 10*3/UL
NRBC BLD AUTO-RTO: 0 /100
PLATELET # BLD AUTO: 238 10E9/L (ref 150–450)
POTASSIUM SERPL-SCNC: 2.8 MMOL/L (ref 3.4–5.3)
POTASSIUM SERPL-SCNC: 3.8 MMOL/L (ref 3.4–5.3)
RBC # BLD AUTO: 3.36 10E12/L (ref 3.8–5.2)
WBC # BLD AUTO: 6.7 10E9/L (ref 4–11)

## 2018-07-03 PROCEDURE — 25000128 H RX IP 250 OP 636: Performed by: INTERNAL MEDICINE

## 2018-07-03 PROCEDURE — 84132 ASSAY OF SERUM POTASSIUM: CPT | Performed by: SURGERY

## 2018-07-03 PROCEDURE — 12000007 ZZH R&B INTERMEDIATE

## 2018-07-03 PROCEDURE — 25000132 ZZH RX MED GY IP 250 OP 250 PS 637: Performed by: INTERNAL MEDICINE

## 2018-07-03 PROCEDURE — 85025 COMPLETE CBC W/AUTO DIFF WBC: CPT | Performed by: SURGERY

## 2018-07-03 PROCEDURE — 97116 GAIT TRAINING THERAPY: CPT | Mod: GP

## 2018-07-03 PROCEDURE — 25000125 ZZHC RX 250: Performed by: PHYSICIAN ASSISTANT

## 2018-07-03 PROCEDURE — 25000128 H RX IP 250 OP 636: Performed by: PHYSICIAN ASSISTANT

## 2018-07-03 PROCEDURE — 82565 ASSAY OF CREATININE: CPT | Performed by: SURGERY

## 2018-07-03 PROCEDURE — 25000132 ZZH RX MED GY IP 250 OP 250 PS 637: Performed by: SURGERY

## 2018-07-03 PROCEDURE — 25000132 ZZH RX MED GY IP 250 OP 250 PS 637: Performed by: PHYSICIAN ASSISTANT

## 2018-07-03 PROCEDURE — 99233 SBSQ HOSP IP/OBS HIGH 50: CPT | Performed by: INTERNAL MEDICINE

## 2018-07-03 PROCEDURE — 36415 COLL VENOUS BLD VENIPUNCTURE: CPT | Performed by: SURGERY

## 2018-07-03 PROCEDURE — 85610 PROTHROMBIN TIME: CPT | Performed by: SURGERY

## 2018-07-03 PROCEDURE — 97530 THERAPEUTIC ACTIVITIES: CPT | Mod: GP

## 2018-07-03 PROCEDURE — 40000193 ZZH STATISTIC PT WARD VISIT

## 2018-07-03 RX ORDER — BISACODYL 10 MG
10 SUPPOSITORY, RECTAL RECTAL DAILY PRN
Status: DISCONTINUED | OUTPATIENT
Start: 2018-07-03 | End: 2018-07-04 | Stop reason: HOSPADM

## 2018-07-03 RX ADMIN — ONDANSETRON 4 MG: 2 INJECTION INTRAMUSCULAR; INTRAVENOUS at 01:22

## 2018-07-03 RX ADMIN — OXYCODONE HYDROCHLORIDE 5 MG: 5 TABLET ORAL at 01:23

## 2018-07-03 RX ADMIN — Medication 10 MEQ: at 03:07

## 2018-07-03 RX ADMIN — METOPROLOL TARTRATE 5 MG: 5 INJECTION, SOLUTION INTRAVENOUS at 08:26

## 2018-07-03 RX ADMIN — Medication 10 MEQ: at 00:55

## 2018-07-03 RX ADMIN — PROCHLORPERAZINE EDISYLATE 5 MG: 5 INJECTION INTRAMUSCULAR; INTRAVENOUS at 05:22

## 2018-07-03 RX ADMIN — PANTOPRAZOLE SODIUM 40 MG: 40 TABLET, DELAYED RELEASE ORAL at 08:27

## 2018-07-03 RX ADMIN — Medication 10 MEQ: at 05:19

## 2018-07-03 RX ADMIN — ONDANSETRON 4 MG: 2 INJECTION INTRAMUSCULAR; INTRAVENOUS at 08:17

## 2018-07-03 RX ADMIN — ATORVASTATIN CALCIUM 20 MG: 20 TABLET, FILM COATED ORAL at 20:59

## 2018-07-03 RX ADMIN — OXYCODONE HYDROCHLORIDE 5 MG: 5 TABLET ORAL at 08:27

## 2018-07-03 RX ADMIN — METOPROLOL TARTRATE 5 MG: 5 INJECTION, SOLUTION INTRAVENOUS at 01:57

## 2018-07-03 RX ADMIN — OXYCODONE HYDROCHLORIDE 5 MG: 5 TABLET ORAL at 12:13

## 2018-07-03 RX ADMIN — METOPROLOL TARTRATE 5 MG: 5 INJECTION, SOLUTION INTRAVENOUS at 20:59

## 2018-07-03 RX ADMIN — OXYCODONE HYDROCHLORIDE 10 MG: 5 TABLET ORAL at 16:27

## 2018-07-03 RX ADMIN — Medication 10 MEQ: at 06:22

## 2018-07-03 RX ADMIN — METOPROLOL TARTRATE 5 MG: 5 INJECTION, SOLUTION INTRAVENOUS at 13:44

## 2018-07-03 RX ADMIN — Medication 10 MEQ: at 04:19

## 2018-07-03 RX ADMIN — Medication 10 MEQ: at 01:57

## 2018-07-03 RX ADMIN — ENOXAPARIN SODIUM 40 MG: 40 INJECTION SUBCUTANEOUS at 20:59

## 2018-07-03 RX ADMIN — ACETAMINOPHEN 650 MG: 325 TABLET, FILM COATED ORAL at 15:04

## 2018-07-03 RX ADMIN — POTASSIUM CHLORIDE 20 MEQ: 1500 TABLET, EXTENDED RELEASE ORAL at 12:13

## 2018-07-03 RX ADMIN — OXYCODONE HYDROCHLORIDE 10 MG: 5 TABLET ORAL at 20:59

## 2018-07-03 RX ADMIN — ASPIRIN 325 MG ORAL TABLET 325 MG: 325 PILL ORAL at 08:27

## 2018-07-03 RX ADMIN — LISINOPRIL 20 MG: 10 TABLET ORAL at 08:27

## 2018-07-03 NOTE — PROGRESS NOTES
SPIRITUAL HEALTH SERVICES Progress Note  [facility] 33    Short visit with Pt per LOS. Pt states being comfortable with her POC and well supported throughout her stay at [facility]. Pt hopes to go home tomorrow when her family is available to help her negotiate the stairs in her house. Pt lives with family and engages her grandson to help with tasks like laundry. Pt named the support she has felt from family as well as prayers from members of her son's [employer] through this health event.      oriented Pt to SH and provided care in presence.    Pt appears to be coping and well supported in her recovery.    SH remains available as needed.    Mariia Peng   Intern

## 2018-07-03 NOTE — PLAN OF CARE
Problem: Surgery Nonspecified (Adult)  Goal: Signs and Symptoms of Listed Potential Problems Will be Absent, Minimized or Managed (Surgery Nonspecified)  Signs and symptoms of listed potential problems will be absent, minimized or managed by discharge/transition of care (reference Surgery Nonspecified (Adult) CPG).   A/O x 4. VSS. Incisions CDI. Pulses per doppler. Electrolytes replaced with recheck in AM. Tolerating diet. Voiding adequately. Nursing will continue to monitor.

## 2018-07-03 NOTE — PLAN OF CARE
Problem: Patient Care Overview  Goal: Plan of Care/Patient Progress Review  Discharge Planner PT   Patient plan for discharge: home with daughter in law and son- reports son works, daughter in law mostly home 24/7 but has own health challenges  Current status: SBA for bed mobility, SBA for transfers and CGA amb 50' with walker.  R ankle DF weakness noted 1/5, altered sensation pt reports hypersensitive.  Pt states weakness and sensation in R foot consistent with how it's felt about the last month prior to surgery.    Barriers to return to prior living situation: stairs to get anywhere in split level home- will address prior to disch  Recommendations for discharge: home with daughter in law assist for driving (same as prior), IADLs and ADLs as needed, OP PT to address gait, balance  Rationale for recommendations: decreased gait, balance due to R foot weakness and decreased sensation- would benefit from skilled OP PT to maximize I with mobility, decrease fall risk       Entered by: Uzma Villafuerte 07/03/2018 9:00 AM

## 2018-07-03 NOTE — PROGRESS NOTES
[facility]  Vascular Medicine Progress Note          Assessment and Plan:       1. 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 of proximal bilateral iliac arteries and severely diseased right SFA with CLI sxs of rest pain S/P Repair of juxtarenal abdominal aortic aneurysm with aorta bilateral femoral bypass with 18 x 9 mm Dacron graft 6/28/2018     POD #5    VSS    HGB stable, 9.2    Positive flatus, no BM yet still    Feet are warm and well perfused , multiphasic pulses    Robust UOP creat normal with diuresis, PAULO improved    K low    RT foot weakness at baseline unable to do dorsiflexion    Wt up 6 kg since admit    Pain controlled     Plan:     PT / OT    Advance diet    Hold diuresis as pt despite weight and fluid positivity appears euvolemic    DVT prophylaxis         2. Hyperlipidemia      Her lipids are presently well controlled (LDL < 70). She should continue same.    3. Hypertension      Currently resolved  hypotension ,  restart home meds once BP improves and able to take po      4.Fe deficiency Anemia      No active bleeding presently    Monitor serial HGB    Avoid NSAIDS    Protonix 40 mg PO BID    Needs EGD, colonosocpy as outpt          Patient care time spent 35 minutes today               Interval History:   doing well; no cp, sob, n/v/d, or abd pain.              Review of Systems:   The 10 point Review of Systems is negative other than noted in the HPI             Medications:     Prescriptions Prior to Admission   Medication Sig Dispense Refill Last Dose     ASPIRIN EC PO Take 81 mg by mouth daily   6/21/2018 at AM     atorvastatin (LIPITOR) 20 MG tablet Take 1 tablet (20 mg) by mouth daily (Patient taking differently: Take 20 mg by mouth every evening ) 90 tablet 3 6/27/2018 at PM     Calcium Carb-Cholecalciferol (CALCIUM 500 +D) 500-400 MG-UNIT TABS Take 1 tablet by mouth 2 times daily   6/27/2018 at PM     Ferrous Sulfate (IRON SUPPLEMENT PO) Take 1 tablet by mouth 2 times  daily   6/27/2018 at 1800     gabapentin (NEURONTIN) 300 MG capsule Take 1 capsule (300 mg) by mouth 3 times daily 90 capsule 3 6/27/2018 at PM     lisinopril (PRINIVIL/ZESTRIL) 20 MG tablet Take 1 tablet (20 mg) by mouth daily 90 tablet 1 6/27/2018 at AM     oxyCODONE-acetaminophen (PERCOCET) 5-325 MG per tablet Take 1 tablet by mouth every 6 hours as needed for severe pain 40 tablet 0 6/27/2018 at PM       aspirin  325 mg Oral Daily     atorvastatin  20 mg Oral QPM     enoxaparin  40 mg Subcutaneous Q24H     lisinopril  20 mg Oral Daily     metoprolol  5 mg Intravenous Q6H     pantoprazole  40 mg Oral QAM     sodium chloride (PF)  3 mL Intracatheter Q8H                  Physical Exam:     Patient Vitals for the past 24 hrs:   BP Temp Temp src Pulse Heart Rate Resp SpO2 Weight   07/03/18 1213 148/72 98.1  F (36.7  C) - - 66 - 99 % -   07/03/18 1005 - 97.8  F (36.6  C) Axillary - - - - -   07/03/18 0910 - - - - - 16 - -   07/03/18 0826 123/51 - - 71 72 - - -   07/03/18 0517 117/62 97.9  F (36.6  C) Oral 70 70 16 98 % -   07/03/18 0400 - - - - - - - 145 lb 15.1 oz (66.2 kg)   07/03/18 0208 - - - - - 16 - -   07/03/18 0155 112/56 - - 74 - 16 - -   07/03/18 0123 - - - - - 18 - -   07/03/18 0000 - - - - - 16 - -   07/02/18 2250 131/72 - - - - - - -   07/02/18 1934 128/66 98.5  F (36.9  C) Oral 79 78 16 95 % -   07/02/18 1513 128/64 98.1  F (36.7  C) Oral 71 73 16 99 % -     Wt Readings from Last 4 Encounters:   07/03/18 145 lb 15.1 oz (66.2 kg)   06/13/18 139 lb (63 kg)   06/11/18 140 lb (63.5 kg)   05/30/18 136 lb (61.7 kg)       Intake/Output Summary (Last 24 hours) at 07/02/18 1157  Last data filed at 07/02/18 0618   Gross per 24 hour   Intake             1178 ml   Output              750 ml   Net              428 ml     Constitutional: normal  Eyes: normal  ENT: normal  Neck: Supple, symmetrical, trachea midline, no adenopathy, thyroid symmetric, not enlarged and no tenderness, skin normal.  Hematologic / Lymphatic:  normal  Back: normal  Lungs: No increased work of breathing, good air exchange, clear to auscultation bilaterally, no crackles or wheezing.  Cardiovascular: Regular rate and rhythm, normal S1 and S2, no S3 or S4, and no murmur noted.  Abdomen: decreased BS, soft  Musculoskeletal: incisions CDI  Neurologic: normal  Neuropsychiatric: normal  Skin: normal  triphasic DP and PT pulses bilaterally           Data:     Results for orders placed or performed during the hospital encounter of 06/28/18 (from the past 24 hour(s))   Potassium   Result Value Ref Range    Potassium 3.0 (L) 3.4 - 5.3 mmol/L   Potassium   Result Value Ref Range    Potassium 2.8 (L) 3.4 - 5.3 mmol/L   CBC with platelets differential   Result Value Ref Range    WBC 6.7 4.0 - 11.0 10e9/L    RBC Count 3.36 (L) 3.8 - 5.2 10e12/L    Hemoglobin 9.2 (L) 11.7 - 15.7 g/dL    Hematocrit 28.3 (L) 35.0 - 47.0 %    MCV 84 78 - 100 fl    MCH 27.4 26.5 - 33.0 pg    MCHC 32.5 31.5 - 36.5 g/dL    RDW 16.4 (H) 10.0 - 15.0 %    Platelet Count 238 150 - 450 10e9/L    Diff Method Automated Method     % Neutrophils 64.6 %    % Lymphocytes 25.9 %    % Monocytes 6.7 %    % Eosinophils 1.8 %    % Basophils 0.4 %    % Immature Granulocytes 0.6 %    Nucleated RBCs 0 0 /100    Absolute Neutrophil 4.4 1.6 - 8.3 10e9/L    Absolute Lymphocytes 1.7 0.8 - 5.3 10e9/L    Absolute Monocytes 0.5 0.0 - 1.3 10e9/L    Absolute Eosinophils 0.1 0.0 - 0.7 10e9/L    Absolute Basophils 0.0 0.0 - 0.2 10e9/L    Abs Immature Granulocytes 0.0 0 - 0.4 10e9/L    Absolute Nucleated RBC 0.0    Creatinine   Result Value Ref Range    Creatinine 0.89 0.52 - 1.04 mg/dL    GFR Estimate 62 >60 mL/min/1.7m2    GFR Estimate If Black 75 >60 mL/min/1.7m2   INR   Result Value Ref Range    INR 1.17 (H) 0.86 - 1.14   Potassium   Result Value Ref Range    Potassium 3.8 3.4 - 5.3 mmol/L

## 2018-07-03 NOTE — PLAN OF CARE
Problem: Patient Care Overview  Goal: Plan of Care/Patient Progress Review  Outcome: Improving  Vital signs are within the normal limits. Patient is alert and oriented. Midline incision is open to air, no redness or drainage. Left and Right groin incisions are clean dry and intact. Pain controlled with IV dilaudid. Up with assist of 1. Tolerating clear liquid diet. Bowel sounds are hypoactive. Flatus present. Voiding to bedpan. Lung sounds are clear but diminished in lower lobes.

## 2018-07-03 NOTE — PLAN OF CARE
Problem: Patient Care Overview  Goal: Plan of Care/Patient Progress Review  Outcome: No Change  A&Ox4. VSS on RA. Incision dressings are CDI. Pain controlled with PRN oxycodone, nausea controlled with Zofran.  Bowel sounds hypoactive, but pt states she is passing gas. Voiding well in bathroom with Ax1 when ambulating. Potassium was 2.8 at midnight, replacement given per orders, will recheck this AM. Held scheduled lasix. Clear liquid diet. Fluids infusing at 75cc/hr. Will continue to monitor.

## 2018-07-03 NOTE — PROGRESS NOTES
"Vascular Surgery    Stable  Tolerating diet. ADAT.  Increase activity  Try suppository  Pt hopeful for discharge to home tomorrow.  May be possible (with Home PT)    Feels pretty well.  Right toes still with some pain but improved with Lasix yesterday.  Abdominal and incision pain ok.  Passing flatus but no BM.    NAD, pleasant, A&O  /72  Pulse 71  Temp 98.1  F (36.7  C)  Resp 16  Ht 1.676 m (5' 6\")  Wt 66.2 kg (145 lb 15.1 oz)  SpO2 99%  BMI 23.56 kg/m2    Intake/Output Summary (Last 24 hours) at 07/03/18 1515  Last data filed at 07/03/18 1427   Gross per 24 hour   Intake              550 ml   Output             2325 ml   Net            -1775 ml     Good DP pulses  Incisions CDI, except amanda mild persistent serous drainage from Left groin  "

## 2018-07-03 NOTE — PROGRESS NOTES
HOSPITALIST CONSULT CHART CHECK:      Hospitalist service was consulted for cross coverage only. We will peripherally follow and chart check throughout the week.        - For vascular medical concerns during business hours M-F, call the [facility] at [phone] to have the rounding/on call Vascular Medicine (NOT VASCULAR SURGERY) MD paged.      - After business hours M-F, for medical concerns on this patient, please page hospitalist staff.      - For vascular surgical questions, please page the appropriate surgeon (primary vascular surgeon or on call vascular surgeon) based upon the time of day.       Donta Valencia PA-C  Hospitalist Physician Assistant

## 2018-07-04 VITALS
HEART RATE: 78 BPM | WEIGHT: 145.94 LBS | DIASTOLIC BLOOD PRESSURE: 67 MMHG | BODY MASS INDEX: 23.46 KG/M2 | TEMPERATURE: 98.2 F | RESPIRATION RATE: 16 BRPM | OXYGEN SATURATION: 99 % | SYSTOLIC BLOOD PRESSURE: 140 MMHG | HEIGHT: 66 IN

## 2018-07-04 LAB
ANION GAP SERPL CALCULATED.3IONS-SCNC: 11 MMOL/L (ref 3–14)
BASOPHILS # BLD AUTO: 0 10E9/L (ref 0–0.2)
BASOPHILS NFR BLD AUTO: 0.6 %
BUN SERPL-MCNC: 10 MG/DL (ref 7–30)
CALCIUM SERPL-MCNC: 7.9 MG/DL (ref 8.5–10.1)
CHLORIDE SERPL-SCNC: 104 MMOL/L (ref 94–109)
CO2 SERPL-SCNC: 21 MMOL/L (ref 20–32)
CREAT SERPL-MCNC: 0.84 MG/DL (ref 0.52–1.04)
DIFFERENTIAL METHOD BLD: ABNORMAL
EOSINOPHIL # BLD AUTO: 0.2 10E9/L (ref 0–0.7)
EOSINOPHIL NFR BLD AUTO: 3.9 %
ERYTHROCYTE [DISTWIDTH] IN BLOOD BY AUTOMATED COUNT: 16.7 % (ref 10–15)
GFR SERPL CREATININE-BSD FRML MDRD: 66 ML/MIN/1.7M2
GLUCOSE SERPL-MCNC: 81 MG/DL (ref 70–99)
HCT VFR BLD AUTO: 24.2 % (ref 35–47)
HGB BLD-MCNC: 8.1 G/DL (ref 11.7–15.7)
IMM GRANULOCYTES # BLD: 0.1 10E9/L (ref 0–0.4)
IMM GRANULOCYTES NFR BLD: 1.4 %
INR PPP: 1.19 (ref 0.86–1.14)
LYMPHOCYTES # BLD AUTO: 1.2 10E9/L (ref 0.8–5.3)
LYMPHOCYTES NFR BLD AUTO: 23.5 %
MCH RBC QN AUTO: 27.9 PG (ref 26.5–33)
MCHC RBC AUTO-ENTMCNC: 33.5 G/DL (ref 31.5–36.5)
MCV RBC AUTO: 83 FL (ref 78–100)
MONOCYTES # BLD AUTO: 0.5 10E9/L (ref 0–1.3)
MONOCYTES NFR BLD AUTO: 10.1 %
NEUTROPHILS # BLD AUTO: 3.1 10E9/L (ref 1.6–8.3)
NEUTROPHILS NFR BLD AUTO: 60.5 %
NRBC # BLD AUTO: 0 10*3/UL
NRBC BLD AUTO-RTO: 0 /100
PLATELET # BLD AUTO: 198 10E9/L (ref 150–450)
POTASSIUM SERPL-SCNC: 3.4 MMOL/L (ref 3.4–5.3)
RBC # BLD AUTO: 2.9 10E12/L (ref 3.8–5.2)
SODIUM SERPL-SCNC: 136 MMOL/L (ref 133–144)
WBC # BLD AUTO: 5.2 10E9/L (ref 4–11)

## 2018-07-04 PROCEDURE — 25000132 ZZH RX MED GY IP 250 OP 250 PS 637: Performed by: INTERNAL MEDICINE

## 2018-07-04 PROCEDURE — 36415 COLL VENOUS BLD VENIPUNCTURE: CPT | Performed by: INTERNAL MEDICINE

## 2018-07-04 PROCEDURE — 80048 BASIC METABOLIC PNL TOTAL CA: CPT | Performed by: INTERNAL MEDICINE

## 2018-07-04 PROCEDURE — 25000132 ZZH RX MED GY IP 250 OP 250 PS 637: Performed by: PHYSICIAN ASSISTANT

## 2018-07-04 PROCEDURE — 85025 COMPLETE CBC W/AUTO DIFF WBC: CPT | Performed by: INTERNAL MEDICINE

## 2018-07-04 PROCEDURE — 99233 SBSQ HOSP IP/OBS HIGH 50: CPT | Performed by: INTERNAL MEDICINE

## 2018-07-04 PROCEDURE — 25000125 ZZHC RX 250: Performed by: PHYSICIAN ASSISTANT

## 2018-07-04 PROCEDURE — 25000132 ZZH RX MED GY IP 250 OP 250 PS 637: Performed by: SURGERY

## 2018-07-04 PROCEDURE — 85610 PROTHROMBIN TIME: CPT | Performed by: INTERNAL MEDICINE

## 2018-07-04 RX ORDER — AMOXICILLIN 250 MG
1 CAPSULE ORAL DAILY
Qty: 100 TABLET | Refills: 1 | Status: SHIPPED | OUTPATIENT
Start: 2018-07-04

## 2018-07-04 RX ORDER — PANTOPRAZOLE SODIUM 40 MG/1
40 TABLET, DELAYED RELEASE ORAL EVERY MORNING
Qty: 60 TABLET | Refills: 1 | Status: SHIPPED | OUTPATIENT
Start: 2018-07-05

## 2018-07-04 RX ORDER — OXYCODONE HYDROCHLORIDE 5 MG/1
5-10 TABLET ORAL EVERY 4 HOURS PRN
Qty: 30 TABLET | Refills: 0 | Status: SHIPPED | OUTPATIENT
Start: 2018-07-04 | End: 2018-07-10

## 2018-07-04 RX ADMIN — OXYCODONE HYDROCHLORIDE 10 MG: 5 TABLET ORAL at 01:05

## 2018-07-04 RX ADMIN — METOPROLOL TARTRATE 5 MG: 5 INJECTION, SOLUTION INTRAVENOUS at 09:53

## 2018-07-04 RX ADMIN — ASPIRIN 325 MG ORAL TABLET 325 MG: 325 PILL ORAL at 08:23

## 2018-07-04 RX ADMIN — OXYCODONE HYDROCHLORIDE 10 MG: 5 TABLET ORAL at 05:39

## 2018-07-04 RX ADMIN — PANTOPRAZOLE SODIUM 40 MG: 40 TABLET, DELAYED RELEASE ORAL at 08:23

## 2018-07-04 RX ADMIN — LISINOPRIL 20 MG: 10 TABLET ORAL at 09:42

## 2018-07-04 RX ADMIN — OXYCODONE HYDROCHLORIDE 10 MG: 5 TABLET ORAL at 12:56

## 2018-07-04 RX ADMIN — BISACODYL 10 MG: 10 SUPPOSITORY RECTAL at 01:05

## 2018-07-04 NOTE — PLAN OF CARE
Problem: Patient Care Overview  Goal: Plan of Care/Patient Progress Review  Outcome: Improving  Vital signs are in the normal ranges. Alert and oriented. Abdominal midline incision is covered with gauze, approximated with stapes dressing is clean dry and intact. Bilateral groin incisions are approximated with staples dressings are clean dry and intact, Left groin dressing has small amount of serous drainage. Pulses are palpable in both feet. Pain controlled with PO Oxy. Up with assist of 1. Tolerating full liquid diet. Bowel sounds are hypoactive. Flatus present. Voiding to toilette. Lung sounds clear but diminished in lower lobes. IS to 1250.

## 2018-07-04 NOTE — PLAN OF CARE
Problem: Patient Care Overview  Goal: Plan of Care/Patient Progress Review  Outcome: Completed Date Met: 07/04/18  Vitals stable. Abd incision staples. L groin staples scant drainage dressing changed. R groin staples no drainage. PRN oxy for pain. RLE numbness, baseline. BLE pulses palpable. Up ambulating. Tolerating diet. Voiding. +BM. Discharge instructions and follow up reviewed with patient. Questions answered. Sent home with discharge medications and walker.

## 2018-07-04 NOTE — DISCHARGE SUMMARY
"[facility]    Discharge Summary  Vascular Medicine    Date of Admission:  6/28/2018  Date of Discharge:  7/4/2018  Discharging Provider: Elio Flores MD    Discharge Diagnoses   Patient Active Problem List   Diagnosis     Essential hypertension with goal blood pressure less than 140/90     Environmental allergies     Hip pain, left     Osteopenia     Aortic aneurysm (H)     Lumbago     Hyponatremia     PAD (peripheral artery disease) (H)     Anemia     Advance Care Planning       Procedure/Surgery Information   Procedure: Procedure(s):  AORTOFEMORAL BYPASS - Wound Class: I-Clean   Surgeon(s): Surgeon(s) and Role:     * Ulisses Meraz MD - Primary     * Kavin Paul PA-C - Assisting     * Germán Simmons PA-C - Assisting     * Janice Randle PA-C - Assisting     * Shannon Marley PA-C - Assisting   Specimens: * No specimens in log *   Non-operative procedures None performed     History of Present Illness   Per 6/28 Vascular Medicine H&P, Trish Wu is a 73 year old female with PMH of: \"a history of hypertension, iron deficiency anemia, and hyperlipidemia who in 2016 was found incidentally on an -xray of her back to have a 4.0 cm AAA. She was first seen by Dr. Meraz of Vascular Surgery in 4/2017 at which time the aneurysm measured 4.3 cm. Plan was to follow up in one year. She scheduled her one year follow-up, but just prior to that developed sensation changes in her right leg and pain near her right ankle. Her pulses were noted to be different. An arterial duplex revealed dampened monophasic waveforms throughout the right lower extremity. A CTA was then done, showing a stable 4.3 cm AAA with essentially no neck and moderate mural thrombus, occlusion of bilateral proximal iliac arteries, severely diseased right SFA and occlusion of the proximal posterior tibial artery on the right. No intervention was felt to be needed at that time. However, she developed " "worsening right foot pain, which significantly impacted her lifestyle and she also developed rest pain. JODIE's were 0.16 on the right and 0.42 on the left. She was evaluated again by Dr. Meraz. Given her critical limb ischemia and known AAA, which would not be amenable to endovascular treatment, an aortobifemoral bypass was recommended, for which she presented today. a history of hypertension, iron deficiency anemia, and hyperlipidemia who in 2016 was found incidentally on an -xray of her back to have a 4.0 cm AAA. She was first seen by Dr. Meraz of Vascular Surgery in 4/2017 at which time the aneurysm measured 4.3 cm. Plan was to follow up in one year. She scheduled her one year follow-up, but just prior to that developed sensation changes in her right leg and pain near her right ankle. Her pulses were noted to be different. An arterial duplex revealed dampened monophasic waveforms throughout the right lower extremity. A CTA was then done, showing a stable 4.3 cm AAA with essentially no neck and moderate mural thrombus, occlusion of bilateral proximal iliac arteries, severely diseased right SFA and occlusion of the proximal posterior tibial artery on the right. No intervention was felt to be needed at that time. However, she developed worsening right foot pain, which significantly impacted her lifestyle and she also developed rest pain. JODIE's were 0.16 on the right and 0.42 on the left. She was evaluated again by Dr. Meraz. Given her critical limb ischemia and known AAA, which would not be amenable to endovascular treatment, an aortobifemoral bypass was recommended, for which she presented today.\"    Hospital Course   Trish Wu was admitted on 6/28/2018.  For more details of the procedure, please refer to Dr. Meraz's dictated operative report.  Patient had an unremarkable postoperative course and was deemed stable for discharge when she was tolerating PO, ambulating, and pain was well controlled.  Home " health PT was ordered and pain Rx were provided.  Questions regarding activity restrictions and wound care were provided.  The following problems were addressed during her hospitalization:    Active Problems:    PAD (peripheral artery disease) (H)      Post-operative pain control: included Oxycodone and will be Oxycodone on discharge.   - During her hospitalization, Trish experienced pain due to postop pain.  The pain plan for discharge was discussed with Trish and the plan was created in a collaborative fashion.    - Opioids prescribed on discharge: Oxycodone  - Duration of opioids after discharge: 30 days      Medications discontinued or adjusted during this hospitalization: New narcotics initiated: oxycodone     Antibiotics prescribed at discharge: None prescribed     Imaging study follow up needs:   -None performed    Significant Findings: not applicable    Elio Flores MD    Discharge Disposition   Discharged to home   Condition at discharge: Good    Pending Results   Final pathology results: No pathology submitted  These results will be followed up by n/a  Unresulted Labs Ordered in the Past 30 Days of this Admission     No orders found from 4/29/2018 to 6/29/2018.          Primary Care Physician   Darline Childs    Physical Exam   Temp: 97.8  F (36.6  C) Temp src: Oral BP: 128/64 Pulse: 74 Heart Rate: 74 Resp: 16 SpO2: 96 % O2 Device: None (Room air)    Vitals:    07/01/18 0651 07/02/18 0516 07/03/18 0400   Weight: 63.9 kg (140 lb 14 oz) 68.5 kg (151 lb 0.2 oz) 66.2 kg (145 lb 15.1 oz)     Vital Signs with Ranges  Temp:  [97.7  F (36.5  C)-98.3  F (36.8  C)] 97.8  F (36.6  C)  Pulse:  [71-74] 74  Heart Rate:  [66-75] 74  Resp:  [16] 16  BP: (123-148)/(51-72) 128/64  SpO2:  [96 %-99 %] 96 %  I/O last 3 completed shifts:  In: 800 [P.O.:800]  Out: 1525 [Urine:1525]    Constitutional: Awake, alert, cooperative, no apparent distress, and appears stated age.  out lesions, oral pharynx with moist mucus membranes,  tonsils without erythema or exudates, gums normal and good dentition.  Respiratory: No increased work of breathing, good air exchange, clear to auscultation bilaterally, no crackles or wheezing.  Cardiovascular: Normal apical impulse, regular rate and rhythm, normal S1 and S2, no S3 or S4, and no murmur noted.  GI: No scars, normal bowel sounds, soft, non-distended, non-tender, no masses palpated, no hepatosplenomegaly, incisions c/d/i  Skin: No bruising or bleeding, normal skin color, texture, turgor, no redness, warmth, or swelling, no rashes, no lesions, no abnormal moles, nails normal without discoloration or clubbing and no jaundice.  Neurologic: Awake, alert, oriented to name, place and time.      Consultations This Hospital Stay   ANESTHESIOLOGY IP CONSULT  SMOKING CESSATION PROGRAM IP CONSULT  PHARMACY IP CONSULT  PHYSICAL THERAPY ADULT IP CONSULT  HOSPITALIST IP CONSULT    Time Spent on this Encounter   I have spent less than 30 minutes on this discharge.    Discharge Orders     Home Care PT Referral for Hospital Discharge     Reason for your hospital stay   Aortobifem Bypass surgery     Follow-up and recommended labs and tests    Follow up with Dr. Meraz at [facility], within 7-10 days  to evaluate after surgery/remove staples. No follow up labs or test are needed.     Activity   Your activity upon discharge: activity as tolerated, ambulate in house and no lifting, driving, or strenuous exercise for 4-6 weeks     Wound care and dressings   Instructions to care for your wound at home: keep wound clean and dry and may get incision wet in shower but do not soak or scrub.     MD face to face encounter   Documentation of Face to Face and Certification for Home Health Services    I certify that patient: Trish Wu is under my care and that I, or a nurse practitioner or physician's assistant working with me, had a face-to-face encounter that meets the physician face-to-face encounter  requirements with this patient on: 7/4/2018.    This encounter with the patient was in whole, or in part, for the following medical condition, which is the primary reason for home health care: Abdominal Aortic Aneurysm.    I certify that, based on my findings, the following services are medically necessary home health services: Physical Therapy.    My clinical findings support the need for the above services because: Physical Therapy Services are needed to assess and treat the following functional impairments: Balance, Deconditioning, Weakness, .Right foot pain    Further, I certify that my clinical findings support that this patient is homebound (i.e. absences from home require considerable and taxing effort and are for medical reasons or [employer] services or infrequently or of short duration when for other reasons) because: Leaving home is medically contraindicated for the following reason(s): Pressure on open wounds during transport impairs healing process...    Based on the above findings. I certify that this patient is confined to the home and needs intermittent skilled nursing care, physical therapy and/or speech therapy.  The patient is under my care, and I have initiated the establishment of the plan of care.  This patient will be followed by a physician who will periodically review the plan of care.  Physician/Provider to provide follow up care: Darline Childs    Attending hospital physician (the Medicare certified [place] provider): Ulisses Meraz MD  Physician Signature: See electronic signature associated with these discharge orders.  Date: 7/4/2018     Full Code     Diet   Follow this diet upon discharge: Advance to a regular diet as tolerated       Discharge Medications   Current Discharge Medication List      START taking these medications    Details   order for DME Equipment being ordered: Walker ()  Treatment Diagnosis: Abdominal Aortic Aneurysm Repair  Qty: 1 Units, Refills: 0     Associated Diagnoses: Abdominal aortic aneurysm (AAA) without rupture (H); Acute post-operative pain      oxyCODONE IR (ROXICODONE) 5 MG tablet Take 1-2 tablets (5-10 mg) by mouth every 4 hours as needed for other (pain control or improvement in physical function. Hold dose for analgesic side effects.)  Qty: 30 tablet, Refills: 0    Associated Diagnoses: Acute post-operative pain         CONTINUE these medications which have NOT CHANGED    Details   ASPIRIN EC PO Take 81 mg by mouth daily      atorvastatin (LIPITOR) 20 MG tablet Take 1 tablet (20 mg) by mouth daily  Qty: 90 tablet, Refills: 3    Associated Diagnoses: PAD (peripheral artery disease) (H)      Calcium Carb-Cholecalciferol (CALCIUM 500 +D) 500-400 MG-UNIT TABS Take 1 tablet by mouth 2 times daily    Associated Diagnoses: Osteopenia, unspecified location      Ferrous Sulfate (IRON SUPPLEMENT PO) Take 1 tablet by mouth 2 times daily      gabapentin (NEURONTIN) 300 MG capsule Take 1 capsule (300 mg) by mouth 3 times daily  Qty: 90 capsule, Refills: 3    Associated Diagnoses: Numbness of right foot         STOP taking these medications       lisinopril (PRINIVIL/ZESTRIL) 20 MG tablet Comments:   Reason for Stopping:         oxyCODONE-acetaminophen (PERCOCET) 5-325 MG per tablet Comments:   Reason for Stopping:             Allergies   Allergies   Allergen Reactions     Penicillins Hives     Data   Most Recent 3 CBC's:  Recent Labs   Lab Test  07/04/18   0731  07/03/18   0730  07/02/18   0820   WBC  5.2  6.7  6.3   HGB  8.1*  9.2*  8.6*   MCV  83  84  85   PLT  198  238  192      Most Recent 3 BMP's:  Recent Labs   Lab Test  07/04/18   0731  07/03/18   0935  07/03/18   0730  07/03/18   0000   07/02/18   0820  07/01/18   0735  06/30/18   0400   06/22/18   1028   NA  136   --    --    --    --   138  140  138   < >  133   POTASSIUM  3.4  3.8   --   2.8*   < >  3.1*  3.7  3.7   < >  4.7   CHLORIDE  104   --    --    --    --    --    --   109   --   101   CO2   21   --    --    --    --    --    --   21   --   23   BUN  10   --    --    --    --   13  13  13   < >  10   CR  0.84   --   0.89   --    --   0.90  0.93  0.96   < >  0.81   ANIONGAP  11   --    --    --    --    --    --   8   --   9   SHAHAB  7.9*   --    --    --    --    --    --   6.7*   --   8.8   GLC  81   --    --    --    --   80  84  104*   < >  101*    < > = values in this interval not displayed.     Most Recent 2 LFT's:  Recent Labs   Lab Test  06/30/18   0400  07/31/17   1116   AST  71*  13   ALT  38  13   ALKPHOS  59  98   BILITOTAL  0.4  0.3     Most Recent INR's and Anticoagulation Dosing History:  Anticoagulation Dose History     Recent Dosing and Labs Latest Ref Rng & Units 6/29/2018 6/30/2018 7/1/2018 7/2/2018 7/3/2018 7/4/2018    INR 0.86 - 1.14 1.09 1.17(H) 1.28(H) 1.25(H) 1.17(H) 1.19(H)        Most Recent 3 Troponin's:No lab results found.  Most Recent Cholesterol Panel:  Recent Labs   Lab Test  06/28/18   1057   CHOL  102   LDL  19   HDL  59   TRIG  122     Most Recent 6 Bacteria Isolates From Any Culture (See EPIC Reports for Culture Details):No lab results found.  Most Recent TSH, T4 and A1c Labs:  Recent Labs   Lab Test  06/28/18   1057   A1C  5.2

## 2018-07-04 NOTE — PROGRESS NOTES
HOSPITALIST CONSULT CHART CHECK:      Hospitalist service was consulted for cross coverage only. We will peripherally follow and chart check throughout the week.        - For vascular medical concerns during business hours M-F, call the [facility] at [phone] to have the rounding/on call Vascular Medicine (NOT VASCULAR SURGERY) MD paged.      - After business hours M-F, for medical concerns on this patient, please page hospitalist staff.      - For vascular surgical questions, please page the appropriate surgeon (primary vascular surgeon or on call vascular surgeon) based upon the time of day.     Elaine Wiggins PA-C  Hospitalist Physician Assistant  Pager: [phone]  7/4/2018 7:17 AM

## 2018-07-04 NOTE — PROGRESS NOTES
PARI:    Pt will discharge today with orders for home PT services. PARI met with pt and discuss options, agreeable to [facility]. SW discussed referral process for this and provided brochure. Pt verbalized understanding and denied any additional needs, excited to discharge to home today. Referral made to [facility].    KATE Lockett, LICSW  Daytime (8:00am-4:30pm): [phone]  After-Hours SW Pager (4:30pm-11:30pm): [phone]

## 2018-07-04 NOTE — PLAN OF CARE
Problem: Patient Care Overview  Goal: Plan of Care/Patient Progress Review  Outcome: No Change  VSS on 1L. A&O. Stoma dressing CDI, with marked dried drainage. LS clear. BS hypo, pain contrlled with IV dilaudid. Pt up standing at bedside this shift and is eager to keep active. Tolerating full liquid diet. CMS intact. Sanders patent.

## 2018-07-04 NOTE — PROGRESS NOTES
[facility]  Vascular Medicine Progress Note      Patient care time spent 35 minutes today.         Assessment and Plan:       1. 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  Continue iron pills  Senna  s for constipation  Take pantoprazole  CBC, BMP blood test in a week at primary and follow up with primary same day   Follow up with  as advised for post op check  Discussed with patient and nursing staff.     2. Hyperlipidemia      Her lipids are presently well controlled (LDL < 70). She should continue same.    3. Hypertension      Currently resolved  hypotension ,  restart home meds once BP improves and able to take po      4.Fe deficiency Anemia      Iron supplementation with senna s    No active bleeding presently    Monitor serial HGB    Avoid NSAIDS    Protonix 40 mg PO BID    Needs EGD, colonosocpy as outpt          Patient care time spent 35 minutes today               Interval History:   doing well; no cp, sob, n/v/d, or abd pain. Low K getting replacement.              Review of Systems:   The 10 point Review of Systems is negative other than noted in the HPI             Medications:     Prescriptions Prior to Admission   Medication Sig Dispense Refill Last Dose     ASPIRIN EC PO Take 81 mg by mouth daily   6/21/2018 at AM     atorvastatin (LIPITOR) 20 MG tablet Take 1 tablet (20 mg) by mouth daily  (Patient taking differently: Take 20 mg by mouth every evening ) 90 tablet 3 6/27/2018 at PM     Calcium Carb-Cholecalciferol (CALCIUM 500 +D) 500-400 MG-UNIT TABS Take 1 tablet by mouth 2 times daily   6/27/2018 at PM     Ferrous Sulfate (IRON SUPPLEMENT PO) Take 1 tablet by mouth 2 times daily   6/27/2018 at 1800     gabapentin (NEURONTIN) 300 MG capsule Take 1 capsule (300 mg) by mouth 3 times daily 90 capsule 3 6/27/2018 at PM     [DISCONTINUED] lisinopril (PRINIVIL/ZESTRIL) 20 MG tablet Take 1 tablet (20 mg) by mouth daily 90 tablet 1 6/27/2018 at AM     [DISCONTINUED] oxyCODONE-acetaminophen (PERCOCET) 5-325 MG per tablet Take 1 tablet by mouth every 6 hours as needed for severe pain 40 tablet 0 6/27/2018 at PM       aspirin  325 mg Oral Daily     atorvastatin  20 mg Oral QPM     enoxaparin  40 mg Subcutaneous Q24H     lisinopril  20 mg Oral Daily     metoprolol  5 mg Intravenous Q6H     pantoprazole  40 mg Oral QAM     sodium chloride (PF)  3 mL Intracatheter Q8H                  Physical Exam:     Patient Vitals for the past 24 hrs:   BP Temp Temp src Pulse Heart Rate Resp SpO2   07/04/18 0825 132/70 97.3  F (36.3  C) Axillary - 75 16 98 %   07/04/18 0624 - - - - - 16 -   07/04/18 0400 - - - - - 16 -   07/04/18 0150 - - - - - 16 -   07/04/18 0000 128/64 97.8  F (36.6  C) Oral - 74 16 96 %   07/03/18 1925 134/72 97.7  F (36.5  C) Oral - 75 16 98 %   07/03/18 1549 130/65 98.3  F (36.8  C) Oral 74 - 16 97 %   07/03/18 1504 - - - - - 16 -   07/03/18 1213 148/72 98.1  F (36.7  C) - - 66 - 99 %   07/03/18 1005 - 97.8  F (36.6  C) Axillary - - - -   07/03/18 0910 - - - - - 16 -     Wt Readings from Last 4 Encounters:   07/03/18 66.2 kg (145 lb 15.1 oz)   06/13/18 63 kg (139 lb)   06/11/18 63.5 kg (140 lb)   05/30/18 61.7 kg (136 lb)       Intake/Output Summary (Last 24 hours) at 07/02/18 1157  Last data filed at 07/02/18 0618   Gross per 24 hour   Intake             1178 ml   Output              750 ml   Net               428 ml     Constitutional: normal  Eyes: normal  ENT: normal  Neck: Supple, symmetrical, trachea midline, no adenopathy, thyroid symmetric, not enlarged and no tenderness, skin normal.  Hematologic / Lymphatic: normal  Back: normal  Lungs: No increased work of breathing, good air exchange, clear to auscultation bilaterally, no crackles or wheezing.  Cardiovascular: Regular rate and rhythm, normal S1 and S2, no S3 or S4, and no murmur noted.  Abdomen: decreased BS, soft  Musculoskeletal: incisions CDI  Neurologic: normal  Neuropsychiatric: normal  Skin: normal  triphasic DP and PT pulses bilaterally           Data:     Results for orders placed or performed during the hospital encounter of 06/28/18 (from the past 24 hour(s))   Potassium   Result Value Ref Range    Potassium 3.8 3.4 - 5.3 mmol/L   CBC with platelets differential   Result Value Ref Range    WBC 5.2 4.0 - 11.0 10e9/L    RBC Count 2.90 (L) 3.8 - 5.2 10e12/L    Hemoglobin 8.1 (L) 11.7 - 15.7 g/dL    Hematocrit 24.2 (L) 35.0 - 47.0 %    MCV 83 78 - 100 fl    MCH 27.9 26.5 - 33.0 pg    MCHC 33.5 31.5 - 36.5 g/dL    RDW 16.7 (H) 10.0 - 15.0 %    Platelet Count 198 150 - 450 10e9/L    Diff Method Automated Method     % Neutrophils 60.5 %    % Lymphocytes 23.5 %    % Monocytes 10.1 %    % Eosinophils 3.9 %    % Basophils 0.6 %    % Immature Granulocytes 1.4 %    Nucleated RBCs 0 0 /100    Absolute Neutrophil 3.1 1.6 - 8.3 10e9/L    Absolute Lymphocytes 1.2 0.8 - 5.3 10e9/L    Absolute Monocytes 0.5 0.0 - 1.3 10e9/L    Absolute Eosinophils 0.2 0.0 - 0.7 10e9/L    Absolute Basophils 0.0 0.0 - 0.2 10e9/L    Abs Immature Granulocytes 0.1 0 - 0.4 10e9/L    Absolute Nucleated RBC 0.0    INR   Result Value Ref Range    INR 1.19 (H) 0.86 - 1.14   Basic metabolic panel   Result Value Ref Range    Sodium 136 133 - 144 mmol/L    Potassium 3.4 3.4 - 5.3 mmol/L    Chloride 104 94 - 109 mmol/L    Carbon Dioxide 21 20 - 32 mmol/L    Anion Gap 11 3 - 14 mmol/L     Glucose 81 70 - 99 mg/dL    Urea Nitrogen 10 7 - 30 mg/dL    Creatinine 0.84 0.52 - 1.04 mg/dL    GFR Estimate 66 >60 mL/min/1.7m2    GFR Estimate If Black 80 >60 mL/min/1.7m2    Calcium 7.9 (L) 8.5 - 10.1 mg/dL

## 2018-07-05 ENCOUNTER — DOCUMENTATION ONLY (OUTPATIENT)
Dept: CARE COORDINATION | Facility: CLINIC | Age: 73
End: 2018-07-05

## 2018-07-05 ENCOUNTER — TELEPHONE (OUTPATIENT)
Dept: FAMILY MEDICINE | Facility: CLINIC | Age: 73
End: 2018-07-05

## 2018-07-05 NOTE — PROGRESS NOTES
[facility] now requests orders and shares plan of care/discharge summaries for some patients through [email or web address].  Please REPLY TO THIS MESSAGE OR ROUTE BACK TO THE AUTHOR in order to give authorization for orders when needed.  This is considered a verbal order, you will still receive a faxed copy of orders for signature.  Thank you for your assistance in improving collaboration for our patients.    ORDER  Physical Therapy to eval and treat for strengthening and HEP.   Occupational Therapy to eval and treat for equipment needs.   Skilled nursing 1x/wk for 1 wk, 2x/wk for 1wk, 1x/wk for 3 wks to perform assessment with focus on cardiac assessment, vitals, medication management and reconciliation, pain management, mental health status and need for referral or change in treatment plan, skin integrity, falls risk. Instruct on disease process, signs/symptoms of complications to report to agency/physician/911, emergency/safety and falls prevention plan, medication effects/SE, new/high risk/changed medications, diet, and activity. Implement interventions to monitor and mitigate pain, prevent pressure ulcers and confirm proper foot care. 3 prn visits for changes to incisions, increased pain and falls assesments, supervisory visits per agency protocol, recertification visits. Visits may be in person or via video conference based upon patient needs.    Medication Clarification needed:  Per Discharge papers written instruction state,  Please take lisinopril same dose as before. But medication list states to stop taking lisinopril. Should pt be taking lisinopril or not?    Thank you,   Anne Marie Malhotra Kajal  [facility]  [email or web address]   Office: [phone]  Cell: [phone]

## 2018-07-05 NOTE — TELEPHONE ENCOUNTER
Pt was seen at [facility] on Wed 7/4/18.  Reason for visit: Abdominal Aortic Aneurysm, LLiac Occlusive Disease, PAD      Thank you!  Pt has appointment 7/9/18    Susie HOSKINS

## 2018-07-05 NOTE — PLAN OF CARE
Problem: Patient Care Overview  Goal: Plan of Care/Patient Progress Review  Physical Therapy Discharge Summary    Reason for therapy discharge:    Discharged to home with home therapy.    Progress towards therapy goal(s). See goals on Care Plan in [facility] electronic health record for goal details.  Goals partially met.  Barriers to achieving goals:   discharge from facility.    Therapy recommendation(s):    Continued therapy is recommended.  Rationale/Recommendations:  To further increase independence with mobility.

## 2018-07-05 NOTE — TELEPHONE ENCOUNTER
"Hospital/TCU/ED for chronic condition Discharge Protocol    \"Hi, my name is Parrismichelle Franklin, a registered nurse, and I am calling from [facility].  I am calling to follow up and see how things are going for you after your recent emergency visit/hospital/TCU stay.\"    Tell me how you are doing now that you are home?\" I am doing really well.  Incisions are covered- they are advising her on care.  She is using a boot for her dropped foot      Discharge Instructions    \"Let's review your discharge instructions.  What is/are the follow-up recommendations?  Pt. Response: She will see us next week and cardiovascular after that.  I asked her to schedule that    \"Has an appointment with your primary care provider been scheduled?\"   Yes. (confirm)    \"When you see the provider, I would recommend that you bring your medications with you.\"    Medications    \"Tell me what changed about your medicines when you discharged?\"    Changes to chronic meds?    0-1    \"What questions do you have about your medications?\"    None     New diagnoses of heart failure, COPD, diabetes, or MI?    No              Medication reconciliation completed? Yes  Was MTM referral placed (*Make sure to put transitions as reason for referral)?   No    Call Summary    \"What questions or concerns do you have about your recent visit and your follow-up care?\"     none    \"If you have questions or things don't continue to improve, we encourage you contact us through the main clinic number (give number).  Even if the clinic is not open, triage nurses are available 24/7 to help you.     We would like you to know that our clinic has extended hours (provide information).  We also have urgent care (provide details on closest location and hours/contact info)\"      \"Thank you for your time and take care!\"                   Home Care PT Referral for Hospital Discharge      Reason for your hospital stay   Aortobifem Bypass surgery      Follow-up and recommended " labs and tests    Follow up with Dr. Meraz at [facility], within 7-10 days  to evaluate after surgery/remove staples. No follow up labs or test are needed.      Activity   Your activity upon discharge: activity as tolerated, ambulate in house and no lifting, driving, or strenuous exercise for 4-6 weeks      Wound care and dressings   Instructions to care for your wound at home: keep wound clean and dry and may get incision wet in shower but do not soak or scrub.      MD face to face encounter   Documentation of Face to Face and Certification for Home Health Services

## 2018-07-06 ENCOUNTER — TELEPHONE (OUTPATIENT)
Dept: FAMILY MEDICINE | Facility: CLINIC | Age: 73
End: 2018-07-06
Payer: COMMERCIAL

## 2018-07-06 NOTE — TELEPHONE ENCOUNTER
[employer] PAF Project printed and given to PCP or MA for completion at patient's on 7/9/18 appointment  Routing to Provider to sign EPIC order (pended in this encounter) once appointment is complete  TC to fax to [phone] to process once it has been signed  Susie HOSKINS

## 2018-07-06 NOTE — PROGRESS NOTES
I am not sure why it says to stop the lisinopril.  I would continue the lisinopril, especially since we just stopped her hctz due to hyponatremia.      Agree with home care orders.    Darline Childs MD

## 2018-07-09 ENCOUNTER — OFFICE VISIT (OUTPATIENT)
Dept: FAMILY MEDICINE | Facility: CLINIC | Age: 73
End: 2018-07-09
Payer: COMMERCIAL

## 2018-07-09 ENCOUNTER — DOCUMENTATION ONLY (OUTPATIENT)
Dept: CARE COORDINATION | Facility: CLINIC | Age: 73
End: 2018-07-09

## 2018-07-09 VITALS
OXYGEN SATURATION: 98 % | SYSTOLIC BLOOD PRESSURE: 146 MMHG | DIASTOLIC BLOOD PRESSURE: 62 MMHG | HEIGHT: 66 IN | HEART RATE: 76 BPM | WEIGHT: 140 LBS | TEMPERATURE: 98 F | BODY MASS INDEX: 22.5 KG/M2

## 2018-07-09 DIAGNOSIS — D64.9 ANEMIA, UNSPECIFIED TYPE: ICD-10-CM

## 2018-07-09 DIAGNOSIS — K59.00 CONSTIPATION, UNSPECIFIED CONSTIPATION TYPE: ICD-10-CM

## 2018-07-09 DIAGNOSIS — Z12.11 SCREEN FOR COLON CANCER: ICD-10-CM

## 2018-07-09 DIAGNOSIS — Z09 HOSPITAL DISCHARGE FOLLOW-UP: Primary | ICD-10-CM

## 2018-07-09 DIAGNOSIS — E87.6 HYPOKALEMIA: ICD-10-CM

## 2018-07-09 DIAGNOSIS — R07.0 THROAT PAIN: ICD-10-CM

## 2018-07-09 DIAGNOSIS — I73.9 PAD (PERIPHERAL ARTERY DISEASE) (H): ICD-10-CM

## 2018-07-09 PROBLEM — E87.1 HYPONATREMIA: Status: RESOLVED | Noted: 2017-08-01 | Resolved: 2018-07-09

## 2018-07-09 LAB
ERYTHROCYTE [DISTWIDTH] IN BLOOD BY AUTOMATED COUNT: 19.3 % (ref 10–15)
HCT VFR BLD AUTO: 26.5 % (ref 35–47)
HGB BLD-MCNC: 8.3 G/DL (ref 11.7–15.7)
MCH RBC QN AUTO: 27.9 PG (ref 26.5–33)
MCHC RBC AUTO-ENTMCNC: 31.3 G/DL (ref 31.5–36.5)
MCV RBC AUTO: 89 FL (ref 78–100)
PLATELET # BLD AUTO: 376 10E9/L (ref 150–450)
RBC # BLD AUTO: 2.98 10E12/L (ref 3.8–5.2)
WBC # BLD AUTO: 6.2 10E9/L (ref 4–11)

## 2018-07-09 PROCEDURE — 36415 COLL VENOUS BLD VENIPUNCTURE: CPT | Performed by: INTERNAL MEDICINE

## 2018-07-09 PROCEDURE — 99207 ZZC RSCC CODE FOR CODING REVIEW: CPT | Performed by: INTERNAL MEDICINE

## 2018-07-09 PROCEDURE — 80048 BASIC METABOLIC PNL TOTAL CA: CPT | Performed by: INTERNAL MEDICINE

## 2018-07-09 PROCEDURE — 85027 COMPLETE CBC AUTOMATED: CPT | Performed by: INTERNAL MEDICINE

## 2018-07-09 PROCEDURE — 99496 TRANSJ CARE MGMT HIGH F2F 7D: CPT | Performed by: INTERNAL MEDICINE

## 2018-07-09 RX ORDER — LISINOPRIL 20 MG/1
TABLET ORAL
COMMUNITY
Start: 2018-06-14 | End: 2018-09-14

## 2018-07-09 NOTE — PATIENT INSTRUCTIONS
Take either milk of magnesia or magnesium citrate to have a bowel movement.    Take miralax 1-2 times per day.

## 2018-07-09 NOTE — PROGRESS NOTES
Dear Dr. Darline Childs  [facility] process is that all ordered disciplines will be involved in the development of the plan of care.  The following disciplines were unable to see Trish Wu; MRN [id number] within the 5 day evaluation window.  There will be a delay in the evalation visit by    OT    We will notify you when the evaluation is completed.  The patient has been notified.      Sincerely [facility]  Trish Murray  [phone]

## 2018-07-09 NOTE — MR AVS SNAPSHOT
After Visit Summary   7/9/2018    Trish Wu    MRN: [id number]           Patient Information     Date Of Birth          1945        Visit Information        Provider Department      7/9/2018 10:40 AM Darline Childs MD [facility] [place]        Today's Diagnoses     Hospital discharge follow-up    -  1    Fever, unspecified fever cause          Care Instructions    Take either milk of magnesia or magnesium citrate to have a bowel movement.    Take miralax 1-2 times per day.           Follow-ups after your visit        Follow-up notes from your care team     Return in about 2 months (around 9/9/2018).      Your next 10 appointments already scheduled     Jul 18, 2018  1:00 PM CDT   Post Op with Ulisses Meraz MD   [facility] ([facility])    [address]  [facility] [address] [phone]              Who to contact     If you have questions or need follow up information about today's clinic visit or your schedule please contact [facility] [place] directly at [phone].  Normal or non-critical lab and imaging results will be communicated to you by MyChart, letter or phone within 4 business days after the clinic has received the results. If you do not hear from us within 7 days, please contact the clinic through [employer] or phone. If you have a critical or abnormal lab result, we will notify you by phone as soon as possible.  Submit refill requests through [employer] or call your pharmacy and they will forward the refill request to us. Please allow 3 business days for your refill to be completed.          Additional Information About Your Visit        MyChart Information     [employer] gives you secure access to your electronic health record. If you see a primary care provider, you can also send messages to your care team and make appointments. If you have questions, please call  "your primary care clinic.  If you do not have a primary care provider, please call [phone] and they will assist you.        Care EveryWhere ID     This is your Care EveryWhere ID. This could be used by other organizations to access your [facility] medical records  [id number]        Your Vitals Were     Pulse Temperature Height Pulse Oximetry BMI (Body Mass Index)       76 100.3  F (37.9  C) (Tympanic) 5' 6\" (1.676 m) 98% 22.6 kg/m2        Blood Pressure from Last 3 Encounters:   07/09/18 146/62   07/04/18 140/67   06/13/18 110/68    Weight from Last 3 Encounters:   07/09/18 140 lb (63.5 kg)   07/03/18 145 lb 15.1 oz (66.2 kg)   06/13/18 139 lb (63 kg)              We Performed the Following     *UA reflex to Microscopic and Culture ([facility] (except [place] and [place])     Basic metabolic panel  (Ca, Cl, CO2, Creat, Gluc, K, Na, BUN)     CBC with platelets          Today's Medication Changes          These changes are accurate as of 7/9/18 11:02 AM.  If you have any questions, ask your nurse or doctor.               These medicines have changed or have updated prescriptions.        Dose/Directions    atorvastatin 20 MG tablet   Commonly known as:  LIPITOR   This may have changed:  when to take this   Used for:  PAD (peripheral artery disease) (H)        Dose:  20 mg   Take 1 tablet (20 mg) by mouth daily   Quantity:  90 tablet   Refills:  3                Primary Care Provider Office Phone # Fax #    Darline Childs [phone]       [address] [place] [id number]        Equal Access to Services     [facility]: Hadii ludmila Villatoro, hilaria roberson, qaybta austenalmalizeth wright. So [facility] [phone].    ATENCIÓN: Si habla español, tiene a valencia disposición servicios gratuitos de asistencia lingüística. Llame al [phone].    We comply with applicable federal civil rights laws and [place] laws. We " do not discriminate on the basis of race, color, national origin, age, disability, sex, sexual orientation, or gender identity.            Thank you!     Thank you for choosing [facility]  for your care. Our goal is always to provide you with excellent care. Hearing back from our patients is one way we can continue to improve our services. Please take a few minutes to complete the written survey that you may receive in the mail after your visit with us. Thank you!             Your Updated Medication List - Protect others around you: Learn how to safely use, store and throw away your medicines at www.disposemymeds.org.          This list is accurate as of 7/9/18 11:02 AM.  Always use your most recent med list.                   Brand Name Dispense Instructions for use Diagnosis    ASPIRIN EC PO      Take 81 mg by mouth daily        atorvastatin 20 MG tablet    LIPITOR    90 tablet    Take 1 tablet (20 mg) by mouth daily    PAD (peripheral artery disease) (H)       Calcium Carb-Cholecalciferol 500-400 MG-UNIT Tabs    calcium 500 +D     Take 1 tablet by mouth 2 times daily    Osteopenia, unspecified location       gabapentin 300 MG capsule    NEURONTIN    90 capsule    Take 1 capsule (300 mg) by mouth 3 times daily    Numbness of right foot       IRON SUPPLEMENT PO      Take 1 tablet by mouth 2 times daily        order for DME     1 Units    Equipment being ordered: Walker () Treatment Diagnosis: Abdominal Aortic Aneurysm Repair    Abdominal aortic aneurysm (AAA) without rupture (H), Acute post-operative pain       oxyCODONE IR 5 MG tablet    ROXICODONE    30 tablet    Take 1-2 tablets (5-10 mg) by mouth every 4 hours as needed for other (pain control or improvement in physical function. Hold dose for analgesic side effects.)    Acute post-operative pain       pantoprazole 40 MG EC tablet    PROTONIX    60 tablet    Take 1 tablet (40 mg) by mouth every morning    Gastroesophageal reflux disease  with esophagitis       senna-docusate 8.6-50 MG per tablet    EQ SENNA-S    100 tablet    Take 1 tablet by mouth daily    Constipation, unspecified constipation type

## 2018-07-09 NOTE — PROGRESS NOTES
[facility] now requests orders and shares plan of care/discharge summaries for some patients through [employer].  Please REPLY TO THIS MESSAGE OR ROUTE BACK TO THE AUTHOR in order to give authorization for orders when needed.  This is considered a verbal order, you will still receive a faxed copy of orders for signature.  Thank you for your assistance in improving collaboration for our patients.    ORDER  PT for 5 visits in month of July    MD SUMMARY/PLAN OF CARE    PT POC to include ther ex for ROM and strength, transfers, gait training including stairs, balance ,and instruction in home ex program.  The plan will also include falls risk assessment and falls prevention plan, monitor and treat pain, monitor skin integrity, monitor for s/s of depression, diabetic foot care, monitor for symptoms of heart failure and to achieve the following goals.  Goals to be met by 7/31,  1. Pt will be indep with HEP for strength, balance.  2. Pt will be able to ambulate at least 5 minutes consec with appropriate DME including indoor and outdoor ambulation in order to return to community distances.  3. Pt will be able to ascend/descend stairs with or without handrail indep with approp DME in order to access all levels of home.  4. Pt will demonstrate at least 2/5 or greater MMT for R DF/ant tib in order to improve gait pattern.

## 2018-07-09 NOTE — PROGRESS NOTES
SUBJECTIVE:   Trish Wu is a 73 year old female who presents to clinic today for the following health issues:          Hospital Follow-up Visit:    Hospital/Nursing Home/IP Rehab Facility: [facility]  Date of Admission: 6/28/18  Date of Discharge: 7/4/18  Reason(s) for Admission: Aortic Aneurysm repair            Problems taking medications regularly:  None       Medication changes since discharge: None       Problems adhering to non-medication therapy:  None    Summary of hospitalization:  [facility] discharge summary reviewed  Diagnostic Tests/Treatments reviewed.  Follow up needed: routine labs  Other Healthcare Providers Involved in Patient s Care:         Homecare and vascular surgery   Update since discharge: stable.     Post Discharge Medication Reconciliation: discharge medications reconciled and changed, per note/orders (see AVS) - should still be on lisinopril 20mg which had for some reason been removed from the list.  Plan of care communicated with patient and daughter-in-law      Coding guidelines for this visit:  Type of Medical   Decision Making Face-to-Face Visit       within 7 Days of discharge Face-to-Face Visit        within 14 days of discharge   Moderate Complexity [id number]   High Complexity 49909 44814            Trish is here for hospital follow up. She was admitted for aortofemoral bypass graft surgery.  She states she didn't even realize how sick she was feeling until she had the surgery.  Pain in her foot is much better.  She denies CP, difficulty breathing.  Her incisions are sore, but seem to be healing well.  She has home nursing and PT.      She is complaining of sore throat and altered sense of taste. It hurts to swallow anything and everything tastes like sand - tasteless and gritty.  This has actually gotten worse since leaving the hospital.  She also is very constipated - no bowel movement since discharge. She is taking 2 senna-docusate tabs daily.   "Appetite is mediocre.  No vomiting, no abdominal pain.        Reviewed and updated as needed this visit by clinical staff  Tobacco  Allergies  Meds  Med Hx  Surg Hx  Fam Hx  Soc Hx      Reviewed and updated as needed this visit by Provider         ROS:  Const, HENT, cv, pulm, GI, msk, heme reviewed,  otherwise negative unless noted above.      OBJECTIVE:     /62  Pulse 76  Temp 98  F (36.7  C) (Tympanic)  Ht 5' 6\" (1.676 m)  Wt 140 lb (63.5 kg)  SpO2 98%  BMI 22.6 kg/m2  Body mass index is 22.6 kg/(m^2).    Gen: tired appearing, pleasant woman, no distress  HEENT: PERRL, sclera nonicteric, oropharynx clear, no oral lesions, MMM. Mild dermatitis around the lips.    Neck: supple, no LAD  Pulm: breathing comfortably, CTAB, no wheezes or rales  CV: RRR, normal S1 and S2, no murmurs  Abd: BS present, soft, nontender, nondistended  Skin: abdominal incision c/d/i  Ext: mild LE edema b/l. Small dark hyperkeratotic lesion on tip of right 5th toe         Results for orders placed or performed in visit on 07/09/18   Basic metabolic panel  (Ca, Cl, CO2, Creat, Gluc, K, Na, BUN)   Result Value Ref Range    Sodium 139 133 - 144 mmol/L    Potassium 2.9 (L) 3.4 - 5.3 mmol/L    Chloride 106 94 - 109 mmol/L    Carbon Dioxide 22 20 - 32 mmol/L    Anion Gap 11 3 - 14 mmol/L    Glucose 100 (H) 70 - 99 mg/dL    Urea Nitrogen 6 (L) 7 - 30 mg/dL    Creatinine 1.00 0.52 - 1.04 mg/dL    GFR Estimate 54 (L) >60 mL/min/1.7m2    GFR Estimate If Black 66 >60 mL/min/1.7m2    Calcium 8.1 (L) 8.5 - 10.1 mg/dL   CBC with platelets   Result Value Ref Range    WBC 6.2 4.0 - 11.0 10e9/L    RBC Count 2.98 (L) 3.8 - 5.2 10e12/L    Hemoglobin 8.3 (L) 11.7 - 15.7 g/dL    Hematocrit 26.5 (L) 35.0 - 47.0 %    MCV 89 78 - 100 fl    MCH 27.9 26.5 - 33.0 pg    MCHC 31.3 (L) 31.5 - 36.5 g/dL    RDW 19.3 (H) 10.0 - 15.0 %    Platelet Count 376 150 - 450 10e9/L           ASSESSMENT/PLAN:       1. Hospital discharge follow-up  Doing pretty well " post-discharge.  Needs follow up labs, would like to get them done while she is here today.  Visit with surgeon not until 7/18.    - Basic metabolic panel  (Ca, Cl, CO2, Creat, Gluc, K, Na, BUN)    2. PAD (peripheral artery disease) (H)  Doing well s/p aortofemoral bypass. On ASA, statin, ACEI.  Will be getting home PT.      3. Throat pain  Possible a complication of intubation, unclear why it is getting worse after discharge rather than better.  Will try OTC topical remedies.  If not improving, will plan to have her see ENT.     4. Constipation, unspecified constipation type  Recommended mag citrate, etc now.  Take miralax 1-2 times daily to keep regular.      5. Anemia   hgb is a bit lower than on discharge, and she received 2 transfusions. Her iron was a little low and ferritin was low/normal a few weeks ago, but I don't know that iron deficiency explains her degree of anemia.  Will plan to continue her iron supplement, repeat in 1 month. Consider colonoscopy and EGD (report of coffee ground emesis after surgery when NG tube in) vs hematology referral.      6. Hypokalemia   Looks like she was having hypokalemia in the hospital which required supplementation.  It is low again now.  She is on lisinopril and no other medications which should lower the potassium. No GI losses. I am unclear as to why her potassium is low. Will have her start a supplement, repeat in 1 week       Darline Childs MD  [facility] [place]

## 2018-07-10 ENCOUNTER — TELEPHONE (OUTPATIENT)
Dept: OTHER | Facility: CLINIC | Age: 73
End: 2018-07-10

## 2018-07-10 DIAGNOSIS — G89.18 ACUTE POST-OPERATIVE PAIN: ICD-10-CM

## 2018-07-10 LAB
ANION GAP SERPL CALCULATED.3IONS-SCNC: 11 MMOL/L (ref 3–14)
BUN SERPL-MCNC: 6 MG/DL (ref 7–30)
CALCIUM SERPL-MCNC: 8.1 MG/DL (ref 8.5–10.1)
CHLORIDE SERPL-SCNC: 106 MMOL/L (ref 94–109)
CO2 SERPL-SCNC: 22 MMOL/L (ref 20–32)
CREAT SERPL-MCNC: 1 MG/DL (ref 0.52–1.04)
GFR SERPL CREATININE-BSD FRML MDRD: 54 ML/MIN/1.7M2
GLUCOSE SERPL-MCNC: 100 MG/DL (ref 70–99)
POTASSIUM SERPL-SCNC: 2.9 MMOL/L (ref 3.4–5.3)
SODIUM SERPL-SCNC: 139 MMOL/L (ref 133–144)

## 2018-07-10 RX ORDER — POTASSIUM CHLORIDE 1500 MG/1
20 TABLET, EXTENDED RELEASE ORAL DAILY
Qty: 30 TABLET | Refills: 1 | Status: SHIPPED | OUTPATIENT
Start: 2018-07-10

## 2018-07-10 RX ORDER — OXYCODONE HYDROCHLORIDE 5 MG/1
5-10 TABLET ORAL EVERY 4 HOURS PRN
Qty: 20 TABLET | Refills: 0 | Status: CANCELLED | OUTPATIENT
Start: 2018-07-10

## 2018-07-10 RX ORDER — OXYCODONE HYDROCHLORIDE 5 MG/1
5-10 TABLET ORAL EVERY 4 HOURS PRN
Qty: 20 TABLET | Refills: 0 | Status: SHIPPED | OUTPATIENT
Start: 2018-07-10 | End: 2018-07-18

## 2018-07-10 NOTE — TELEPHONE ENCOUNTER
Pt is s/p juxtarenal abdominal aortic aneurysm repair with aorta bilateral femoral bypass with 18 x 9 mm Dacron graft on 6/28/18 with Dr. Meraz.    Pt called requesting a refill of oxycodone.  Reports she has 9 left and is still rating her pain between a 4-7.  Pt states she is mostly taking it after therapy and to help sleep at night.      [facility] as she had no one to  script.    Will discuss with Dr. Meraz.  Sofia Kim, ALYSSAN, RN

## 2018-07-11 NOTE — TELEPHONE ENCOUNTER
Contacted pt with update.  Signed script will be mailed via US Mail to [facility] [address]    Pt verbalized understanding and had no further questions at this time.  Sofia Kim, ALYSSAN, RN

## 2018-07-16 ENCOUNTER — TELEPHONE (OUTPATIENT)
Dept: FAMILY MEDICINE | Facility: CLINIC | Age: 73
End: 2018-07-16

## 2018-07-16 NOTE — TELEPHONE ENCOUNTER
Verbal order given for 1 additional OT visit for ADL and apaptive equipment. Will deliver the patient a shower chair. Kylee Ibarra RN

## 2018-07-18 ENCOUNTER — OFFICE VISIT (OUTPATIENT)
Dept: OTHER | Facility: CLINIC | Age: 73
End: 2018-07-18
Attending: SURGERY
Payer: COMMERCIAL

## 2018-07-18 VITALS — SYSTOLIC BLOOD PRESSURE: 139 MMHG | HEART RATE: 65 BPM | DIASTOLIC BLOOD PRESSURE: 72 MMHG

## 2018-07-18 DIAGNOSIS — Z09 FOLLOW-UP EXAMINATION FOLLOWING SURGERY: Primary | ICD-10-CM

## 2018-07-18 DIAGNOSIS — G89.18 ACUTE POST-OPERATIVE PAIN: ICD-10-CM

## 2018-07-18 PROCEDURE — 99024 POSTOP FOLLOW-UP VISIT: CPT | Mod: ZP | Performed by: SURGERY

## 2018-07-18 PROCEDURE — G0463 HOSPITAL OUTPT CLINIC VISIT: HCPCS

## 2018-07-18 RX ORDER — OXYCODONE HYDROCHLORIDE 5 MG/1
5-10 TABLET ORAL EVERY 4 HOURS PRN
Qty: 20 TABLET | Refills: 0 | Status: SHIPPED | OUTPATIENT
Start: 2018-07-18 | End: 2018-07-27

## 2018-07-18 NOTE — MR AVS SNAPSHOT
After Visit Summary   7/18/2018    Trish Wu    MRN: [id number]           Patient Information     Date Of Birth          1945        Visit Information        Provider Department      7/18/2018 1:00 PM Ulisses Meraz MD [facility]      Today's Diagnoses     Follow-up examination following surgery    -  1       Follow-ups after your visit        Your next 10 appointments already scheduled     Jul 23, 2018 10:00 AM CDT   LAB with EC LAB   [facility] [place] ([address] [address]   [phone]           OUTSIDE LABS: Please include name of facility and Physician that is requesting outside labs be drawn.  Please indicate if labs are fasting or non-fasting on appt notes.  Be as specific as you can on which labs are being drawn.              Who to contact     If you have questions or need follow up information about today's clinic visit or your schedule please contact [facility] directly at [phone].  Normal or non-critical lab and imaging results will be communicated to you by Webcomhart, letter or phone within 4 business days after the clinic has received the results. If you do not hear from us within 7 days, please contact the clinic through MedAwaret or phone. If you have a critical or abnormal lab result, we will notify you by phone as soon as possible.  Submit refill requests through Wallstr or call your pharmacy and they will forward the refill request to us. Please allow 3 business days for your refill to be completed.          Additional Information About Your Visit        Webcomhart Information     Wallstr gives you secure access to your electronic health record. If you see a primary care provider, you can also send messages to your care team and make appointments. If you have questions, please call your primary care  clinic.  If you do not have a primary care provider, please call [phone] and they will assist you.        Care EveryWhere ID     This is your Care EveryWhere ID. This could be used by other organizations to access your [place] medical records  [id number]        Your Vitals Were     Pulse Breastfeeding?                65 No           Blood Pressure from Last 3 Encounters:   07/18/18 139/72   07/09/18 146/62   07/04/18 140/67    Weight from Last 3 Encounters:   07/09/18 140 lb (63.5 kg)   07/03/18 145 lb 15.1 oz (66.2 kg)   06/13/18 139 lb (63 kg)              Today, you had the following     No orders found for display         Today's Medication Changes          These changes are accurate as of 7/18/18  2:05 PM.  If you have any questions, ask your nurse or doctor.               These medicines have changed or have updated prescriptions.        Dose/Directions    atorvastatin 20 MG tablet   Commonly known as:  LIPITOR   This may have changed:  when to take this   Used for:  PAD (peripheral artery disease) (H)        Dose:  20 mg   Take 1 tablet (20 mg) by mouth daily   Quantity:  90 tablet   Refills:  3            Where to get your medicines      Some of these will need a paper prescription and others can be bought over the counter.  Ask your nurse if you have questions.     Bring a paper prescription for each of these medications     oxyCODONE IR 5 MG tablet                Primary Care Provider Office Phone # Fax #    Darline Childs [phone]       [address] [place] [address]        Equal Access to Services     [facility] AH: Hadii ludmila ku hadasho Sokoali, waaxda luqadaha, qaybta kaalmada adeegyada, lizeth huber. So [facility] [phone].    ATENCIÓN: Si habla español, tiene a valencia disposición servicios gratuitos de asistencia lingüística. Llame al [phone].    We comply with applicable federal civil rights laws and [place] laws. We do  not discriminate on the basis of race, color, national origin, age, disability, sex, sexual orientation, or gender identity.            Thank you!     Thank you for choosing [facility]  for your care. Our goal is always to provide you with excellent care. Hearing back from our patients is one way we can continue to improve our services. Please take a few minutes to complete the written survey that you may receive in the mail after your visit with us. Thank you!             Your Updated Medication List - Protect others around you: Learn how to safely use, store and throw away your medicines at www.disposemymeds.org.          This list is accurate as of 7/18/18  2:05 PM.  Always use your most recent med list.                   Brand Name Dispense Instructions for use Diagnosis    ASPIRIN EC PO      Take 81 mg by mouth daily        atorvastatin 20 MG tablet    LIPITOR    90 tablet    Take 1 tablet (20 mg) by mouth daily    PAD (peripheral artery disease) (H)       Calcium Carb-Cholecalciferol 500-400 MG-UNIT Tabs    calcium 500 +D     Take 1 tablet by mouth 2 times daily    Osteopenia, unspecified location       gabapentin 300 MG capsule    NEURONTIN    90 capsule    Take 1 capsule (300 mg) by mouth 3 times daily    Numbness of right foot       IRON SUPPLEMENT PO      Take 1 tablet by mouth 2 times daily        lisinopril 20 MG tablet    PRINIVIL/ZESTRIL          order for DME     1 Units    Equipment being ordered: Walker () Treatment Diagnosis: Abdominal Aortic Aneurysm Repair    Abdominal aortic aneurysm (AAA) without rupture (H), Acute post-operative pain       oxyCODONE IR 5 MG tablet    ROXICODONE    20 tablet    Take 1-2 tablets (5-10 mg) by mouth every 4 hours as needed for other (pain control or improvement in physical function. Hold dose for analgesic side effects.)    Acute post-operative pain       pantoprazole 40 MG EC tablet    PROTONIX    60 tablet    Take 1 tablet (40 mg) by  mouth every morning    Gastroesophageal reflux disease with esophagitis       potassium chloride SA 20 MEQ CR tablet    KLOR-CON    30 tablet    Take 1 tablet (20 mEq) by mouth daily    Hypokalemia       senna-docusate 8.6-50 MG per tablet    EQ SENNA-S    100 tablet    Take 1 tablet by mouth daily    Constipation, unspecified constipation type

## 2018-07-18 NOTE — LETTER
[facility] [address]  [place] [address]  Phone: [phone]  Fax: [phone]      2018    Re: Trish Wu -BELIA 1945    Wounds  Healing nicely.  Right foot drop (present pre operatively) improving.  All staples removed.  Palpable pedal pulses bilaterally.  Appetite and BM returning to normal.  Doing very well.  Discussed diet, activity, wound care follow up  Prn.      Ulisses Meraz MD

## 2018-07-18 NOTE — PROGRESS NOTES
Wounds  Healing nicely.  Right foot drop (present pre operatively) improving.  All staples removed.  Palpable pedal pulses bilaterally.  Appetite and BM returning to normal.  Doing very well.  Discussed diet, activity, wound care follow up  Prn.

## 2018-07-18 NOTE — NURSING NOTE
"Trish Wu is a 73 year old female who presents for:  Chief Complaint   Patient presents with     RECHECK     1st PO; AORTO FEMORAL BYPASS         Vitals:    Vitals:    07/18/18 1311   BP: 139/72   BP Location: Right arm   Patient Position: Chair   Cuff Size: Adult Regular   Pulse: 65       BMI:  Estimated body mass index is 22.6 kg/(m^2) as calculated from the following:    Height as of 7/9/18: 5' 6\" (1.676 m).    Weight as of 7/9/18: 140 lb (63.5 kg).    Pain Score:  Data Unavailable        Xenia Anthony MA      "

## 2018-07-20 DIAGNOSIS — Z53.9 DIAGNOSIS NOT YET DEFINED: Primary | ICD-10-CM

## 2018-07-20 PROCEDURE — G0180 MD CERTIFICATION HHA PATIENT: HCPCS | Performed by: INTERNAL MEDICINE

## 2018-07-23 DIAGNOSIS — E87.6 HYPOKALEMIA: ICD-10-CM

## 2018-07-23 PROCEDURE — 84132 ASSAY OF SERUM POTASSIUM: CPT | Performed by: INTERNAL MEDICINE

## 2018-07-23 PROCEDURE — 36415 COLL VENOUS BLD VENIPUNCTURE: CPT | Performed by: INTERNAL MEDICINE

## 2018-07-24 LAB — POTASSIUM SERPL-SCNC: 4.7 MMOL/L (ref 3.4–5.3)

## 2018-07-27 ENCOUNTER — TELEPHONE (OUTPATIENT)
Dept: FAMILY MEDICINE | Facility: CLINIC | Age: 73
End: 2018-07-27

## 2018-07-27 ENCOUNTER — OFFICE VISIT (OUTPATIENT)
Dept: FAMILY MEDICINE | Facility: CLINIC | Age: 73
End: 2018-07-27
Payer: COMMERCIAL

## 2018-07-27 VITALS
SYSTOLIC BLOOD PRESSURE: 98 MMHG | DIASTOLIC BLOOD PRESSURE: 60 MMHG | WEIGHT: 128 LBS | TEMPERATURE: 98.7 F | BODY MASS INDEX: 20.66 KG/M2 | HEART RATE: 91 BPM

## 2018-07-27 DIAGNOSIS — D64.9 ANEMIA, UNSPECIFIED TYPE: ICD-10-CM

## 2018-07-27 DIAGNOSIS — M21.371 RIGHT FOOT DROP: Primary | ICD-10-CM

## 2018-07-27 DIAGNOSIS — E83.51 HYPOCALCEMIA: ICD-10-CM

## 2018-07-27 DIAGNOSIS — G89.18 ACUTE POST-OPERATIVE PAIN: ICD-10-CM

## 2018-07-27 PROCEDURE — 99214 OFFICE O/P EST MOD 30 MIN: CPT | Performed by: INTERNAL MEDICINE

## 2018-07-27 RX ORDER — OXYCODONE HYDROCHLORIDE 5 MG/1
5-10 TABLET ORAL EVERY 4 HOURS PRN
Qty: 30 TABLET | Refills: 0 | Status: SHIPPED | OUTPATIENT
Start: 2018-07-27 | End: 2018-08-08

## 2018-07-27 NOTE — PROGRESS NOTES
SUBJECTIVE:   Trish Wu is a 73 year old female who presents to clinic today for the following health issues:    Foot drop - finishing up with home PT and OT.  Her foot drop is getting a little better, still has a ways to go. Wondering if she should continue to get outpatient PT.      Taste in mouth - maybe slightly better. Told by surgeon it was related to the abx she received valeria-op, and it often takes 4-6 weeks to normalize.  She states she is eating and drinking well.  Wondering if a probiotic would help.      Pain - taking oxycodone 1-3 times per day.  Gets shooting nerve pains in various places.  Overall feels like her pain is gradually lessening.            Reviewed and updated as needed this visit by clinical staff  Tobacco  Allergies  Meds       Reviewed and updated as needed this visit by Provider         ROS:  HENT, GI, neuro, msk reviewed,  otherwise negative unless noted above.       OBJECTIVE:     BP 98/60 (BP Location: Left arm, Patient Position: Chair, Cuff Size: Adult Regular)  Pulse 91  Temp 98.7  F (37.1  C) (Tympanic)  Wt 128 lb (58.1 kg)  BMI 20.66 kg/m2  Body mass index is 20.66 kg/(m^2).    Gen: pleasant, thin, woman, no distress  Ext: boot on right foot       ASSESSMENT/PLAN:       1. Right foot drop  Recommended PT at [facility], but that is too far for her.  She is going to call to [facility] and let me know which one she decides to go to .     2. Anemia, unspecified type  Only a couple weeks from the last check.  Will have her return in 2-3 weeks for labs.    - **CBC with platelets FUTURE anytime; Future  - **Ferritin FUTURE 2mo; Future  - **Iron and iron binding capacity FUTURE anytime; Future    3. Hypocalcemia  Also needs check of ical   - Vitamin D Deficiency; Future  - Calcium ionized; Future  - Albumin level; Future    4. Acute post-operative pain  Still in the somewhat post op period, but counseled we do need to keep an close eye on her pain med usage and make  sure she is using it less frequently with time.  Should not need long term.   - oxyCODONE IR (ROXICODONE) 5 MG tablet; Take 1-2 tablets (5-10 mg) by mouth every 4 hours as needed for other (pain control or improvement in physical function. Hold dose for analgesic side effects.)  Dispense: 30 tablet; Refill: 0      F/U 2-3 weeks for lab work   Did note her weight is down today, will need to follow closely.      Darline Childs MD  [facility] [place]

## 2018-07-27 NOTE — TELEPHONE ENCOUNTER
Patient saw Dr Childs today  Patient was told to check to see if Dr Eddy can treat for this and if he can to contact Dr Childs.  TC informed patient that Dr Eddy is out of the office until Tuesday   Patient would like a call back at [phone]  Susie HOSKINS

## 2018-07-27 NOTE — MR AVS SNAPSHOT
After Visit Summary   7/27/2018    Trish Wu    MRN: [id number]           Patient Information     Date Of Birth          1945        Visit Information        Provider Department      7/27/2018 1:00 PM Darline Childs MD [facility]        Today's Diagnoses     Right foot drop    -  1    Anemia, unspecified type        Hypocalcemia        Acute post-operative pain           Follow-ups after your visit        Follow-up notes from your care team     Return in about 2 weeks (around 8/10/2018) for 2-3 weeks for labs. .      Future tests that were ordered for you today     Open Future Orders        Priority Expected Expires Ordered    **CBC with platelets FUTURE anytime Routine 7/27/2018 7/27/2019 7/27/2018    **Iron and iron binding capacity FUTURE anytime Routine 7/27/2018 7/27/2019 7/27/2018    **Ferritin FUTURE 2mo Routine 7/27/2018 11/24/2018 7/27/2018    Vitamin D Deficiency Routine  7/27/2019 7/27/2018    Calcium ionized Routine  7/27/2019 7/27/2018    Albumin level Routine  7/27/2019 7/27/2018            Who to contact     If you have questions or need follow up information about today's clinic visit or your schedule please contact [facility] directly at [phone].  Normal or non-critical lab and imaging results will be communicated to you by MyChart, letter or phone within 4 business days after the clinic has received the results. If you do not hear from us within 7 days, please contact the clinic through MyChart or phone. If you have a critical or abnormal lab result, we will notify you by phone as soon as possible.  Submit refill requests through PureWRX or call your pharmacy and they will forward the refill request to us. Please allow 3 business days for your refill to be completed.          Additional Information About Your Visit        Foundation Medicinehart Information     PureWRX gives you secure access to your electronic health record. If you see a  primary care provider, you can also send messages to your care team and make appointments. If you have questions, please call your primary care clinic.  If you do not have a primary care provider, please call [phone] and they will assist you.        Care EveryWhere ID     This is your Care EveryWhere ID. This could be used by other organizations to access your [place] medical records  [id number]        Your Vitals Were     Pulse Temperature BMI (Body Mass Index)             91 98.7  F (37.1  C) (Tympanic) 20.66 kg/m2          Blood Pressure from Last 3 Encounters:   07/27/18 98/60   07/18/18 139/72   07/09/18 146/62    Weight from Last 3 Encounters:   07/27/18 128 lb (58.1 kg)   07/09/18 140 lb (63.5 kg)   07/03/18 145 lb 15.1 oz (66.2 kg)                 Today's Medication Changes          These changes are accurate as of 7/27/18  1:45 PM.  If you have any questions, ask your nurse or doctor.               These medicines have changed or have updated prescriptions.        Dose/Directions    atorvastatin 20 MG tablet   Commonly known as:  LIPITOR   This may have changed:  when to take this   Used for:  PAD (peripheral artery disease) (H)        Dose:  20 mg   Take 1 tablet (20 mg) by mouth daily   Quantity:  90 tablet   Refills:  3            Where to get your medicines      Some of these will need a paper prescription and others can be bought over the counter.  Ask your nurse if you have questions.     Bring a paper prescription for each of these medications     oxyCODONE IR 5 MG tablet               Information about OPIOIDS     PRESCRIPTION OPIOIDS: WHAT YOU NEED TO KNOW   We gave you an opioid (narcotic) pain medicine. It is important to manage your pain, but opioids are not always the best choice. You should first try all the other options your care team gave you. Take this medicine for as short a time (and as few doses) as possible.     These medicines have risks:    DO NOT drive when on new or higher  doses of pain medicine. These medicines can affect your alertness and reaction times, and you could be arrested for driving under the influence (DUI). If you need to use opioids long-term, talk to your care team about driving.    DO NOT operate heave machinery    DO NOT do any other dangerous activities while taking these medicines.     DO NOT drink any alcohol while taking these medicines.      If the opioid prescribed includes acetaminophen, DO NOT take with any other medicines that contain acetaminophen. Read all labels carefully. Look for the word  acetaminophen  or  Tylenol.  Ask your pharmacist if you have questions or are unsure.    You can get addicted to pain medicines, especially if you have a history of addiction (chemical, alcohol or substance dependence). Talk to your care team about ways to reduce this risk.    Store your pills in a secure place, locked if possible. We will not replace any lost or stolen medicine. If you don t finish your medicine, please throw away (dispose) as directed by your pharmacist. The [place] Pollution Control Agency has more information about safe disposal: https://www.pca.[facility].mn.us/living-green/managing-unwanted-medications.     All opioids tend to cause constipation. Drink plenty of water and eat foods that have a lot of fiber, such as fruits, vegetables, prune juice, apple juice and high-fiber cereal. Take a laxative (Miralax, milk of magnesia, Colace, Senna) if you don t move your bowels at least every other day.          Primary Care Provider Office Phone # Fax #    Darline Childs [phone]       [address] [place] [address]        Equal Access to Services     MARK FLORENCE : Hadii ludmila silva Soevie, waaxda luqadaha, qaybta kaalmada lizeth koehler. So [facility] [phone].    ATENCIÓN: Si habla español, tiene a valencia disposición servicios gratuitos de asistencia lingüística. Llame al  [phone].    We comply with applicable federal civil rights laws and [place] laws. We do not discriminate on the basis of race, color, national origin, age, disability, sex, sexual orientation, or gender identity.            Thank you!     Thank you for choosing [facility]  for your care. Our goal is always to provide you with excellent care. Hearing back from our patients is one way we can continue to improve our services. Please take a few minutes to complete the written survey that you may receive in the mail after your visit with us. Thank you!             Your Updated Medication List - Protect others around you: Learn how to safely use, store and throw away your medicines at www.disposemymeds.org.          This list is accurate as of 7/27/18  1:45 PM.  Always use your most recent med list.                   Brand Name Dispense Instructions for use Diagnosis    ASPIRIN EC PO      Take 81 mg by mouth daily        atorvastatin 20 MG tablet    LIPITOR    90 tablet    Take 1 tablet (20 mg) by mouth daily    PAD (peripheral artery disease) (H)       Calcium Carb-Cholecalciferol 500-400 MG-UNIT Tabs    calcium 500 +D     Take 1 tablet by mouth 2 times daily    Osteopenia, unspecified location       gabapentin 300 MG capsule    NEURONTIN    90 capsule    Take 1 capsule (300 mg) by mouth 3 times daily    Numbness of right foot       IRON SUPPLEMENT PO      Take 1 tablet by mouth 2 times daily        lisinopril 20 MG tablet    PRINIVIL/ZESTRIL          order for DME     1 Units    Equipment being ordered: Walker () Treatment Diagnosis: Abdominal Aortic Aneurysm Repair    Abdominal aortic aneurysm (AAA) without rupture (H), Acute post-operative pain       oxyCODONE IR 5 MG tablet    ROXICODONE    30 tablet    Take 1-2 tablets (5-10 mg) by mouth every 4 hours as needed for other (pain control or improvement in physical function. Hold dose for analgesic side effects.)    Acute post-operative  pain       pantoprazole 40 MG EC tablet    PROTONIX    60 tablet    Take 1 tablet (40 mg) by mouth every morning    Gastroesophageal reflux disease with esophagitis       potassium chloride SA 20 MEQ CR tablet    KLOR-CON    30 tablet    Take 1 tablet (20 mEq) by mouth daily    Hypokalemia       senna-docusate 8.6-50 MG per tablet    EQ SENNA-S    100 tablet    Take 1 tablet by mouth daily    Constipation, unspecified constipation type

## 2018-08-03 ENCOUNTER — OFFICE VISIT (OUTPATIENT)
Dept: ORTHOPEDICS | Facility: CLINIC | Age: 73
End: 2018-08-03
Payer: COMMERCIAL

## 2018-08-03 VITALS
SYSTOLIC BLOOD PRESSURE: 109 MMHG | DIASTOLIC BLOOD PRESSURE: 73 MMHG | BODY MASS INDEX: 20.57 KG/M2 | HEIGHT: 66 IN | WEIGHT: 128 LBS

## 2018-08-03 DIAGNOSIS — I73.9 PAD (PERIPHERAL ARTERY DISEASE) (H): ICD-10-CM

## 2018-08-03 DIAGNOSIS — R29.898 WEAKNESS OF FOOT, RIGHT: Primary | ICD-10-CM

## 2018-08-03 PROCEDURE — 99213 OFFICE O/P EST LOW 20 MIN: CPT | Performed by: FAMILY MEDICINE

## 2018-08-03 ASSESSMENT — ENCOUNTER SYMPTOMS
TINGLING: 0
FOCAL WEAKNESS: 0
SENSORY CHANGE: 1
BACK PAIN: 1

## 2018-08-03 NOTE — LETTER
8/3/2018         RE: Trish Wu  [address] [place] [address]        Dear Colleague,    Thank you for referring your patient, Trish Wu, to the [facility]. Please see a copy of my visit note below.    Musculoskeletal Problem          [facility]   Follow-up Visit s Aug 3, 2018    PCP: Darline Childs      Subjective:  Trish is a 73 year old female who is seen in follow up for evaluation of   Chief Complaint   Patient presents with     Right Foot - Pain     Her last visit was on 6/22/17.  Since that time, symptoms have been  a good deal worse . Trish Wu is accompanied today by self.     The patient had vascular surgery June 2018 and after she began to have a right drop foot issue. Patient has weakness in her toes and right leg. Worse with walking. She is currently using a walking boot and pain pills for symptoms treatment.   Pain is located right foot, waxing and waning.  Patient is using walking boot.    Patient denies any new injuries.    Patient's past medical, surgical, social and family histories are reviewed today.    Social History: retired     Past Medical History:   Diagnosis Date     Aortic aneurysm (H)      Environmental allergies 6/14/2016     Essential hypertension with goal blood pressure less than 140/90 6/14/2016     Hip pain, left      Osteopenia        Patient Active Problem List    Diagnosis Date Noted     Advance Care Planning 06/25/2018     Priority: Medium     Anemia 06/18/2018     Priority: Medium     PAD (peripheral artery disease) (H) 05/11/2018     Priority: Medium     Lumbago 05/22/2017     Priority: Medium     Aortic aneurysm (H)      Priority: Medium     Essential hypertension with goal blood pressure less than 140/90 06/14/2016     Priority: Medium     Environmental allergies 06/14/2016     Priority: Medium     Hip pain, left      Priority: Medium     Osteopenia      Priority: Medium  "      Family History   Problem Relation Age of Onset     Osteoperosis Mother      Type 2 Diabetes Son        Social History     Social History     Marital status:      Spouse name: N/A     Number of children: N/A     Years of education: N/A     Social History Main Topics     Smoking status: Former Smoker     Smokeless tobacco: Never Used     Alcohol use 0.0 oz/week     0 Standard drinks or equivalent per week      Comment: rare     Past Surgical History:   Procedure Laterality Date     BYPASS GRAFT AORTOFEMORAL N/A 6/28/2018    Procedure: BYPASS GRAFT AORTOFEMORAL;  AORTOFEMORAL BYPASS;  Surgeon: Ulisses Meraz MD;  Location: [facility] OR     CATARACT IOL, RT/LT       HYSTERECTOMY TOTAL ABDOMINAL  1966     MAMMOPLASTY AUGMENTATION       VAGOTOMY, PYLOROPLASTY, COMBINED         Review of Systems   Musculoskeletal: Positive for back pain and joint pain.   Neurological: Positive for sensory change. Negative for tingling and focal weakness.   All other systems reviewed and are negative.        Physical Exam  /73  Ht 5' 6\" (1.676 m)  Wt 128 lb (58.1 kg)  BMI 20.66 kg/m2  Constitutional:well-developed, well-nourished, and in no distress.   Cardiovascular: Intact distal pulses.    Neurological: alert. Gait abnormal, wearing walking boot on the right side  Skin: Skin is warm and dry.   Psychiatric: Mood and affect normal.   Respiratory: unlabored, speaks in full sentences  Lymph: no LAD, no lymphangitis      Right Ankle Exam   Swelling: None    Tenderness   None    Range of Motion   Dorsiflexion:     Normal  Plantar flexion: Normal  Inversion:         Normal  Eversion:         Normal    Muscle Strength   Dorsiflexion:         3/5  Plantar flexion:     5/5  Anterior tibial:       5/5  Posterior tibial:     3/5  Gastrosoleus:      5/5  Peroneal muscle: 3/5    Tests   Anterior drawer: Negative  Varus tilt: NegativeComments  Dysesthesias along plantar surface of the foot and dorsal toes, hypoesthesias dorsal " foot and lateral aspects of foot.  Marked calf atrophy.  Intact pulses              ASSESSMENT/PLAN    ICD-10-CM    1. Weakness of foot, right M21.41 [facility]   2. PAD (peripheral artery disease) (H) I73.9 [facility]       Marked weakness with dorsiflexion and internal and external strength testing, related to claudication and status post aortofemoral bypass surgery 1 month ago.  Apparently her symptoms were quite severe at onset with purple toes but this has recovered and she is now able to have some toe dorsiflexion which was not present at the time of surgery.  I am uncertain how much of this she will recover.  Today she states she is a 25% of normal and would hope to be at 75%.  Discussed obtaining an EMG to assess nerve recovery potential versus starting physical therapy.  She opted for the latter.  Consider EMG/NCV if persistent in 2 or 3 months    Again, thank you for allowing me to participate in the care of your patient.        Sincerely,        Kavin Eddy MD

## 2018-08-08 ENCOUNTER — TELEPHONE (OUTPATIENT)
Dept: OTHER | Facility: CLINIC | Age: 73
End: 2018-08-08

## 2018-08-08 DIAGNOSIS — G89.18 ACUTE POST-OPERATIVE PAIN: ICD-10-CM

## 2018-08-08 NOTE — TELEPHONE ENCOUNTER
oxyCODONE IR (ROXICODONE) 5 MG tablet      Last Written Prescription Date:  7/27/18  Last Fill Quantity: 30,   # refills: 0  Last Office Visit: 7/27/18 with Dr Childs. 8/3/18 with Dr Eddy  Future Office visit:       Routing refill request to provider for review/approval because:  Drug not on the G, P or [facility] refill protocol or controlled substance

## 2018-08-08 NOTE — TELEPHONE ENCOUNTER
Controlled Substance Refill Request for oxycodone  Problem List Complete:  No     PROVIDER TO CONSIDER COMPLETION OF PROBLEM LIST AND OVERVIEW/CONTROLLED SUBSTANCE AGREEMENT    Last Written Prescription Date:  7/27/18  Last Fill Quantity: 30,   # refills: 0    Last Office Visit with [facility] primary care provider: 7/27/18    Future Office visit:     Controlled substance agreement on file: No.     Processing:  Patient will  in clinic   checked in past 3 months?  No, route to RN     [place]  reviewed- recommend provider review - placed on PCP desk    [place]:  [email or web address]    Parul Cadet RN   [facility] - Triage

## 2018-08-08 NOTE — TELEPHONE ENCOUNTER
"Pt LVM on triage line with an update for Dr. Meraz that she is \"doing much better\" and was wondering if she is allowed to travel.  Discussed with pt that she can travel and encouraged her to make sure she gets up and walk around while traveling, as well as do ankle pumps to prevent possible blood clots.  Pt verbalized understanding and had no further questions.  Sofia Kim, ALYSSAN, RN    "

## 2018-08-09 RX ORDER — OXYCODONE HYDROCHLORIDE 5 MG/1
5-10 TABLET ORAL EVERY 6 HOURS PRN
Qty: 30 TABLET | Refills: 0 | Status: SHIPPED | OUTPATIENT
Start: 2018-08-09 | End: 2018-08-28

## 2018-08-09 NOTE — TELEPHONE ENCOUNTER
At the visit on 7/27, she told me she was taking 1-3 per day. Looks like it is at least twice daily.  The other prescriptions are from vascular surgery, and she was just discharged over a month ago.      We talked at the visit about gradually taking less as she is farther from surgery and the foot and vasculature heal.  I will refill for 30 tabs; I would like this to last her at least 2 weeks.      Darline Childs MD

## 2018-08-10 ENCOUNTER — THERAPY VISIT (OUTPATIENT)
Dept: PHYSICAL THERAPY | Facility: CLINIC | Age: 73
End: 2018-08-10
Attending: FAMILY MEDICINE
Payer: COMMERCIAL

## 2018-08-10 DIAGNOSIS — M79.671 RIGHT FOOT PAIN: Primary | ICD-10-CM

## 2018-08-10 PROCEDURE — G8979 MOBILITY GOAL STATUS: HCPCS | Mod: GP

## 2018-08-10 PROCEDURE — 97110 THERAPEUTIC EXERCISES: CPT | Mod: GP

## 2018-08-10 PROCEDURE — G8978 MOBILITY CURRENT STATUS: HCPCS | Mod: GP

## 2018-08-10 PROCEDURE — 97161 PT EVAL LOW COMPLEX 20 MIN: CPT | Mod: GP

## 2018-08-10 NOTE — MR AVS SNAPSHOT
After Visit Summary   8/10/2018    Trish Wu    MRN: [id number]           Patient Information     Date Of Birth          1945        Visit Information        Provider Department      8/10/2018 11:00 AM Hung Jimenez PT [facility]        Today's Diagnoses     Right foot pain    -  1       Follow-ups after your visit        Your next 10 appointments already scheduled     Aug 13, 2018 10:15 AM CDT   LAB with [facility] LAB   [facility] ([address] [address]   [phone]           OUTSIDE LABS: Please include name of facility and Physician that is requesting outside labs be drawn.  Please indicate if labs are fasting or non-fasting on appt notes.  Be as specific as you can on which labs are being drawn.              Who to contact     If you have questions or need follow up information about today's clinic visit or your schedule please contact [facility] directly at [phone].  Normal or non-critical lab and imaging results will be communicated to you by Kuraturhart, letter or phone within 4 business days after the clinic has received the results. If you do not hear from us within 7 days, please contact the clinic through PaperKarmat or phone. If you have a critical or abnormal lab result, we will notify you by phone as soon as possible.  Submit refill requests through [email or web address] or call your pharmacy and they will forward the refill request to us. Please allow 3 business days for your refill to be completed.          Additional Information About Your Visit        Kuraturhart Information     [email or web address] gives you secure access to your electronic health record. If you see a primary care provider, you can also send messages to your care team and make appointments. If you have questions, please call your primary care clinic.  If you  do not have a primary care provider, please call [phone] and they will assist you.        Care EveryWhere ID     This is your Care EveryWhere ID. This could be used by other organizations to access your [place] medical records  [id number]         Blood Pressure from Last 3 Encounters:   08/03/18 109/73   07/27/18 98/60   07/18/18 139/72    Weight from Last 3 Encounters:   08/03/18 58.1 kg (128 lb)   07/27/18 58.1 kg (128 lb)   07/09/18 63.5 kg (140 lb)              We Performed the Following     HC PT EVAL, LOW COMPLEXITY     THERAPEUTIC EXERCISES          Today's Medication Changes          These changes are accurate as of 8/10/18 11:45 AM.  If you have any questions, ask your nurse or doctor.               These medicines have changed or have updated prescriptions.        Dose/Directions    atorvastatin 20 MG tablet   Commonly known as:  LIPITOR   This may have changed:  when to take this   Used for:  PAD (peripheral artery disease) (H)        Dose:  20 mg   Take 1 tablet (20 mg) by mouth daily   Quantity:  90 tablet   Refills:  3                Primary Care Provider Office Phone # Fax #    Darline Childs [phone]       [address] [place] [address]        Equal Access to Services     JESSICA FLORENCE AH: Nora harpero Soevie, waaxda luqadaha, qaybta kaalmada adeshaniceyada, lizeth huber. So [facility] [phone].    ATENCIÓN: Si habla español, tiene a valencia disposición servicios gratuitos de asistencia lingüística. Llame al [phone].    We comply with applicable federal civil rights laws and [place] laws. We do not discriminate on the basis of race, color, national origin, age, disability, sex, sexual orientation, or gender identity.            Thank you!     Thank you for choosing [facility]  for your care. Our goal is always to provide you with excellent care. Hearing back from our  patients is one way we can continue to improve our services. Please take a few minutes to complete the written survey that you may receive in the mail after your visit with us. Thank you!             Your Updated Medication List - Protect others around you: Learn how to safely use, store and throw away your medicines at www.disposemymeds.org.          This list is accurate as of 8/10/18 11:45 AM.  Always use your most recent med list.                   Brand Name Dispense Instructions for use Diagnosis    ASPIRIN EC PO      Take 81 mg by mouth daily        atorvastatin 20 MG tablet    LIPITOR    90 tablet    Take 1 tablet (20 mg) by mouth daily    PAD (peripheral artery disease) (H)       Calcium Carb-Cholecalciferol 500-400 MG-UNIT Tabs    calcium 500 +D     Take 1 tablet by mouth 2 times daily    Osteopenia, unspecified location       gabapentin 300 MG capsule    NEURONTIN    90 capsule    Take 1 capsule (300 mg) by mouth 3 times daily    Numbness of right foot       IRON SUPPLEMENT PO      Take 1 tablet by mouth 2 times daily        lisinopril 20 MG tablet    PRINIVIL/ZESTRIL          order for DME     1 Units    Equipment being ordered: Walker () Treatment Diagnosis: Abdominal Aortic Aneurysm Repair    Abdominal aortic aneurysm (AAA) without rupture (H), Acute post-operative pain       oxyCODONE IR 5 MG tablet    ROXICODONE    30 tablet    Take 1-2 tablets (5-10 mg) by mouth every 6 hours as needed for severe pain    Acute post-operative pain       pantoprazole 40 MG EC tablet    PROTONIX    60 tablet    Take 1 tablet (40 mg) by mouth every morning    Gastroesophageal reflux disease with esophagitis       potassium chloride SA 20 MEQ CR tablet    KLOR-CON    30 tablet    Take 1 tablet (20 mEq) by mouth daily    Hypokalemia       senna-docusate 8.6-50 MG per tablet    EQ SENNA-S    100 tablet    Take 1 tablet by mouth daily    Constipation, unspecified constipation type

## 2018-08-10 NOTE — PROGRESS NOTES
[facility] Initial Evaluation  Subjective:  Patient is a 73 year old female presenting with rehab right ankle/foot hpi.   Trish Wu is a 73 year old female with a right ankle condition.  Condition occurred with:  Other reason.  Condition occurred: other.  This is a recurrent condition  Onset: Virginie 15 2018 but has been going on for a while. CC: weakness and shooting pain in toes and foot; wears boot when up and about for 3-4 weeks and it does help. Hyper sensitive on bottom of foot;  .    Patient reports pain:  Longitudinal arch and toes.    Pain is described as aching, burning and shooting and is intermittent and reported as 3/10.  Associated symptoms:  Tingling, numbness, loss of motion/stiffness and edema. Pain is the same all the time.  Symptoms are exacerbated by walking and certain positions and relieved by bracing/immobilizing.  Since onset symptoms are unchanged.                                                      Objective:    Gait:    Gait Type:  Antalgic   Assistive Devices:  CAM            Ankle/Foot Evaluation  ROM:    AROM:        Inversion: Left:      Right:  15  Eversion:     Right:  20      PROM:    Dorsiflexion: Left:        Right:   10                 Strength:    Dorsiflexion:  Right: 1/5   Pain:  Plantarflexion: Right: 4/5  Pain:  Inversion:Right: 3+/5  Pain:  Eversion:Right: 3+/5  Pain:              Strength wnl ankle: proximal hip strength grossly 4/5 and quick to fatigue (10 reps bridge, Abd)      PALPATION: Palpation of ankle: Decreased sensation to palpation plantar surface of foot.        FUNCTIONAL TESTS: Functional test ankle: LOB with SLS                                                               General     ROS    Patient is a 73 year old female with right side ankle complaints.    Patient has the following significant findings with corresponding treatment plan.                Diagnosis 1:  R foot PAD;   Pain -  home program  Decreased ROM/flexibility -  manual therapy and therapeutic exercise  Decreased strength - therapeutic exercise and therapeutic activities  Impaired muscle performance - neuro re-education  Decreased function - therapeutic activities    Therapy Evaluation Codes:   1) History comprised of:   Personal factors that impact the plan of care:      None.    Comorbidity factors that impact the plan of care are:      PAD.     Medications impacting care: None.  2) Examination of Body Systems comprised of:   Body structures and functions that impact the plan of care:      Foot.   limitations that impact the plan of care are:      Squatting/kneeling, Stairs and Walking.  3) Clinical presentation characteristics are:   Stable/Uncomplicated.  4) Decision-Making    Low complexity using standardized patient assessment instrument and/or measureable assessment of functional outcome.  Cumulative Therapy Evaluation is: Low complexity.    Previous and current functional limitations:  (See Goal Flow Sheet for this information)    Short term and Long term goals: (See Goal Flow Sheet for this information)     Communication ability:  Patient appears to be able to clearly communicate and understand verbal and written communication and follow directions correctly.  Treatment Explanation - The following has been discussed with the patient:   RX ordered/plan of care  Anticipated outcomes  Possible risks and side effects  This patient would benefit from PT intervention to resume normal activities.   Rehab potential is fair.    Frequency:  1 X week, once daily  Duration:  for 8 weeks  Discharge Plan:  Achieve all LTG.  Independent in home treatment program.  Reach maximal therapeutic benefit.    Please refer to the daily flowsheet for treatment today, total treatment time and time spent performing 1:1 timed codes.

## 2018-08-10 NOTE — PROGRESS NOTES
[facility] Initial Evaluation  Subjective:  Patient is a 73 year old female presenting with rehab general hpi.   General   Please check all that apply to your current or past medical history:  High blood pressure (Foot drop)  Medical allergies:  Yes (Penicillin)  Surgical history: Vascaler.  Medications you are currently taking:  High blood pressure medication and pain medication (Complete list of meds in chart)  Occupation comment:  Retired  Employment tasks: No home tasks.                      Objective:  System    Physical Exam    General     ROS    Assessment/Plan:

## 2018-08-13 DIAGNOSIS — D64.9 ANEMIA, UNSPECIFIED TYPE: ICD-10-CM

## 2018-08-13 DIAGNOSIS — E83.51 HYPOCALCEMIA: ICD-10-CM

## 2018-08-13 LAB
ALBUMIN SERPL-MCNC: 3.9 G/DL (ref 3.4–5)
CA-I SERPL ISE-MCNC: 5 MG/DL (ref 4.4–5.2)
ERYTHROCYTE [DISTWIDTH] IN BLOOD BY AUTOMATED COUNT: 16 % (ref 10–15)
FERRITIN SERPL-MCNC: 58 NG/ML (ref 8–252)
HCT VFR BLD AUTO: 33.5 % (ref 35–47)
HGB BLD-MCNC: 10.5 G/DL (ref 11.7–15.7)
IRON SATN MFR SERPL: 25 % (ref 15–46)
IRON SERPL-MCNC: 71 UG/DL (ref 35–180)
MCH RBC QN AUTO: 28.5 PG (ref 26.5–33)
MCHC RBC AUTO-ENTMCNC: 31.3 G/DL (ref 31.5–36.5)
MCV RBC AUTO: 91 FL (ref 78–100)
PLATELET # BLD AUTO: 253 10E9/L (ref 150–450)
RBC # BLD AUTO: 3.69 10E12/L (ref 3.8–5.2)
TIBC SERPL-MCNC: 283 UG/DL (ref 240–430)
WBC # BLD AUTO: 6.2 10E9/L (ref 4–11)

## 2018-08-13 PROCEDURE — 82040 ASSAY OF SERUM ALBUMIN: CPT | Performed by: INTERNAL MEDICINE

## 2018-08-13 PROCEDURE — 85027 COMPLETE CBC AUTOMATED: CPT | Performed by: INTERNAL MEDICINE

## 2018-08-13 PROCEDURE — 82728 ASSAY OF FERRITIN: CPT | Performed by: INTERNAL MEDICINE

## 2018-08-13 PROCEDURE — 36415 COLL VENOUS BLD VENIPUNCTURE: CPT | Performed by: INTERNAL MEDICINE

## 2018-08-13 PROCEDURE — 82330 ASSAY OF CALCIUM: CPT | Performed by: INTERNAL MEDICINE

## 2018-08-13 PROCEDURE — 83540 ASSAY OF IRON: CPT | Performed by: INTERNAL MEDICINE

## 2018-08-13 PROCEDURE — 83550 IRON BINDING TEST: CPT | Performed by: INTERNAL MEDICINE

## 2018-08-13 PROCEDURE — 82306 VITAMIN D 25 HYDROXY: CPT | Performed by: INTERNAL MEDICINE

## 2018-08-14 ENCOUNTER — THERAPY VISIT (OUTPATIENT)
Dept: PHYSICAL THERAPY | Facility: CLINIC | Age: 73
End: 2018-08-14
Payer: COMMERCIAL

## 2018-08-14 DIAGNOSIS — I73.9 PAD (PERIPHERAL ARTERY DISEASE) (H): ICD-10-CM

## 2018-08-14 DIAGNOSIS — M79.671 RIGHT FOOT PAIN: ICD-10-CM

## 2018-08-14 LAB — DEPRECATED CALCIDIOL+CALCIFEROL SERPL-MC: 25 UG/L (ref 20–75)

## 2018-08-14 PROCEDURE — 97110 THERAPEUTIC EXERCISES: CPT | Mod: GP

## 2018-08-21 ENCOUNTER — THERAPY VISIT (OUTPATIENT)
Dept: PHYSICAL THERAPY | Facility: CLINIC | Age: 73
End: 2018-08-21
Payer: COMMERCIAL

## 2018-08-21 DIAGNOSIS — M54.50 LUMBAGO: ICD-10-CM

## 2018-08-21 DIAGNOSIS — I73.9 PAD (PERIPHERAL ARTERY DISEASE) (H): ICD-10-CM

## 2018-08-21 DIAGNOSIS — M79.671 RIGHT FOOT PAIN: ICD-10-CM

## 2018-08-21 DIAGNOSIS — M25.552 HIP PAIN, LEFT: ICD-10-CM

## 2018-08-21 PROCEDURE — 97110 THERAPEUTIC EXERCISES: CPT | Mod: GP

## 2018-08-21 PROCEDURE — 97530 THERAPEUTIC ACTIVITIES: CPT | Mod: GP

## 2018-08-21 NOTE — MR AVS SNAPSHOT
After Visit Summary   8/21/2018    Trish Wu    MRN: [id number]           Patient Information     Date Of Birth          1945        Visit Information        Provider Department      8/21/2018 12:50 PM Hung Jimenez PT [facility]        Today's Diagnoses     Lumbago        Hip pain, left        Right foot pain        PAD (peripheral artery disease) (H)           Follow-ups after your visit        Who to contact     If you have questions or need follow up information about today's clinic visit or your schedule please contact [facility] directly at [phone].  Normal or non-critical lab and imaging results will be communicated to you by [employer], letter or phone within 4 business days after the clinic has received the results. If you do not hear from us within 7 days, please contact the clinic through [employer] or phone. If you have a critical or abnormal lab result, we will notify you by phone as soon as possible.  Submit refill requests through [employer] or call your pharmacy and they will forward the refill request to us. Please allow 3 business days for your refill to be completed.          Additional Information About Your Visit        MyChart Information     [employer] gives you secure access to your electronic health record. If you see a primary care provider, you can also send messages to your care team and make appointments. If you have questions, please call your primary care clinic.  If you do not have a primary care provider, please call [phone] and they will assist you.        Care EveryWhere ID     This is your Care EveryWhere ID. This could be used by other organizations to access your [facility] medical records  [id number]         Blood Pressure from Last 3 Encounters:   08/03/18 109/73   07/27/18 98/60   07/18/18 139/72    Weight from Last 3 Encounters:   08/03/18 58.1 kg  (128 lb)   07/27/18 58.1 kg (128 lb)   07/09/18 63.5 kg (140 lb)              We Performed the Following     THERAPEUTIC ACTIVITIES     THERAPEUTIC EXERCISES          Today's Medication Changes          These changes are accurate as of 8/21/18  1:32 PM.  If you have any questions, ask your nurse or doctor.               These medicines have changed or have updated prescriptions.        Dose/Directions    atorvastatin 20 MG tablet   Commonly known as:  LIPITOR   This may have changed:  when to take this   Used for:  PAD (peripheral artery disease) (H)        Dose:  20 mg   Take 1 tablet (20 mg) by mouth daily   Quantity:  90 tablet   Refills:  3                Primary Care Provider Office Phone # Fax #    Darline Childs [phone]       [address] [facility] [id number]        Equal Access to Services     JESSICA FLORENCE : Nora Villatoro, wacarly roberson, qaybeddy kaaljennifer koehler, lizeth brito . So [facility] [phone].    ATENCIÓN: Si habla español, tiene a valencia disposición servicios gratuitos de asistencia lingüística. [facility] [phone].    We comply with applicable federal civil rights laws and [place] laws. We do not discriminate on the basis of race, color, national origin, age, disability, sex, sexual orientation, or gender identity.            Thank you!     Thank you for choosing [facility]  for your care. Our goal is always to provide you with excellent care. Hearing back from our patients is one way we can continue to improve our services. Please take a few minutes to complete the written survey that you may receive in the mail after your visit with us. Thank you!             Your Updated Medication List - Protect others around you: Learn how to safely use, store and throw away your medicines at www.disposemymeds.org.          This list is accurate as of 8/21/18  1:32 PM.   Always use your most recent med list.                   Brand Name Dispense Instructions for use Diagnosis    ASPIRIN EC PO      Take 81 mg by mouth daily        atorvastatin 20 MG tablet    LIPITOR    90 tablet    Take 1 tablet (20 mg) by mouth daily    PAD (peripheral artery disease) (H)       Calcium Carb-Cholecalciferol 500-400 MG-UNIT Tabs    calcium 500 +D     Take 1 tablet by mouth 2 times daily    Osteopenia, unspecified location       gabapentin 300 MG capsule    NEURONTIN    90 capsule    Take 1 capsule (300 mg) by mouth 3 times daily    Numbness of right foot       IRON SUPPLEMENT PO      Take 1 tablet by mouth 2 times daily        lisinopril 20 MG tablet    PRINIVIL/ZESTRIL          order for DME     1 Units    Equipment being ordered: Walker () Treatment Diagnosis: Abdominal Aortic Aneurysm Repair    Abdominal aortic aneurysm (AAA) without rupture (H), Acute post-operative pain       oxyCODONE IR 5 MG tablet    ROXICODONE    30 tablet    Take 1-2 tablets (5-10 mg) by mouth every 6 hours as needed for severe pain    Acute post-operative pain       pantoprazole 40 MG EC tablet    PROTONIX    60 tablet    Take 1 tablet (40 mg) by mouth every morning    Gastroesophageal reflux disease with esophagitis       potassium chloride SA 20 MEQ CR tablet    KLOR-CON    30 tablet    Take 1 tablet (20 mEq) by mouth daily    Hypokalemia       senna-docusate 8.6-50 MG per tablet    EQ SENNA-S    100 tablet    Take 1 tablet by mouth daily    Constipation, unspecified constipation type

## 2018-08-28 ENCOUNTER — TELEPHONE (OUTPATIENT)
Dept: FAMILY MEDICINE | Facility: CLINIC | Age: 73
End: 2018-08-28

## 2018-08-28 DIAGNOSIS — G89.18 ACUTE POST-OPERATIVE PAIN: ICD-10-CM

## 2018-08-28 RX ORDER — OXYCODONE HYDROCHLORIDE 5 MG/1
5-10 TABLET ORAL EVERY 6 HOURS PRN
Qty: 30 TABLET | Refills: 0 | Status: SHIPPED | OUTPATIENT
Start: 2018-08-28 | End: 2018-09-14

## 2018-08-28 NOTE — TELEPHONE ENCOUNTER
Oxycodone IR (Roxicodone) 5 mg      Last Written Prescription Date:  8/8/18  Last Fill Quantity: 30,   # refills: 0  Last Office Visit: 7/27/18 Naz  Future Office visit:       Routing refill request to provider for review/approval because:  Drug not on the [facility], [facility] or [facility] refill protocol or controlled substance     checked on 8/8/18    Umu Ayala RN  EP Triage

## 2018-08-28 NOTE — TELEPHONE ENCOUNTER
Reason for Call:  Medication or medication refill:    Do you use a [facility]?  Name of the pharmacy and phone number for the current request:  [facility] [place] [address]    Name of the medication requested: oxyCODONE IR (ROXICODONE) 5 MG tablet    Other request: Please walk to pharmacy when printed     Can we leave a detailed message on this number? YES    Phone number patient can be reached at: Home number on file [phone] (home)    Best Time: anytime     Call taken on 8/28/2018 at 3:23 PM by Court Florentino

## 2018-08-28 NOTE — TELEPHONE ENCOUNTER
It looks like she has started to space out her use.  Medication refilled, expect the 30 tabs should start to last longer and longer.     Darline Childs MD

## 2018-09-14 ENCOUNTER — OFFICE VISIT (OUTPATIENT)
Dept: FAMILY MEDICINE | Facility: CLINIC | Age: 73
End: 2018-09-14
Payer: COMMERCIAL

## 2018-09-14 VITALS
HEART RATE: 74 BPM | SYSTOLIC BLOOD PRESSURE: 120 MMHG | BODY MASS INDEX: 21.14 KG/M2 | TEMPERATURE: 98.3 F | DIASTOLIC BLOOD PRESSURE: 60 MMHG | WEIGHT: 131 LBS

## 2018-09-14 DIAGNOSIS — R20.0 NUMBNESS OF RIGHT FOOT: ICD-10-CM

## 2018-09-14 DIAGNOSIS — Z23 NEED FOR PROPHYLACTIC VACCINATION AND INOCULATION AGAINST INFLUENZA: ICD-10-CM

## 2018-09-14 DIAGNOSIS — G89.18 ACUTE POST-OPERATIVE PAIN: ICD-10-CM

## 2018-09-14 DIAGNOSIS — I10 BENIGN ESSENTIAL HYPERTENSION: ICD-10-CM

## 2018-09-14 DIAGNOSIS — I73.9 PAD (PERIPHERAL ARTERY DISEASE) (H): Primary | ICD-10-CM

## 2018-09-14 PROCEDURE — 99214 OFFICE O/P EST MOD 30 MIN: CPT | Mod: 25 | Performed by: INTERNAL MEDICINE

## 2018-09-14 PROCEDURE — G0008 ADMIN INFLUENZA VIRUS VAC: HCPCS | Performed by: INTERNAL MEDICINE

## 2018-09-14 PROCEDURE — 90662 IIV NO PRSV INCREASED AG IM: CPT | Performed by: INTERNAL MEDICINE

## 2018-09-14 RX ORDER — OXYCODONE HYDROCHLORIDE 5 MG/1
5-10 TABLET ORAL EVERY 6 HOURS PRN
Qty: 30 TABLET | Refills: 0 | Status: SHIPPED | OUTPATIENT
Start: 2018-09-14

## 2018-09-14 RX ORDER — ATORVASTATIN CALCIUM 20 MG/1
20 TABLET, FILM COATED ORAL EVERY EVENING
Qty: 90 TABLET | Refills: 3 | Status: SHIPPED | OUTPATIENT
Start: 2018-09-14

## 2018-09-14 RX ORDER — POLYETHYLENE GLYCOL 3350 17 G/17G
1 POWDER, FOR SOLUTION ORAL DAILY
COMMUNITY

## 2018-09-14 RX ORDER — GABAPENTIN 300 MG/1
300 CAPSULE ORAL 3 TIMES DAILY
Qty: 90 CAPSULE | Refills: 3 | Status: CANCELLED | OUTPATIENT
Start: 2018-09-14

## 2018-09-14 RX ORDER — LISINOPRIL 20 MG/1
20 TABLET ORAL DAILY
Qty: 90 TABLET | Refills: 1 | Status: SHIPPED | OUTPATIENT
Start: 2018-09-14

## 2018-09-14 NOTE — PROGRESS NOTES

## 2018-09-14 NOTE — PROGRESS NOTES
SUBJECTIVE:   Trish Wu is a 73 year old female who presents to clinic today for the following health issues:      Medication Followup of Gabapentin     Taking Medication as prescribed: yes    Side Effects:  None    Medication Helping Symptoms:  Not sure      Trish is a 74 y/o female with peripheral arterial disease s/p right aortofemoral bypass grafting with resultant right foot drop, who is coming in for medication refills prior to leaving for [place] for the next 6 months. She has been taking Gabapentin 300 mg TID for pain in her feet but reports that it does nothing for her. She does not want another refill of this. She continues to take Oxycodone 1 tablet daily, as well as Lisinopril and Lipitor (recently started by her primary physician).       *Also notes she feels she needs to get off the iron, is very constipated , senna doesn't work, and she strains enough to where her incision hurts. She is supposed to be taking it twice daily but has cut down to once daily already and it still constipated. Her hemoglobin in July after her surgery was 8.1 but most recently was 10.4 mg/dL.       Problem list and histories reviewed & adjusted, as indicated.  Additional history: as documented    Patient Active Problem List   Diagnosis     Essential hypertension with goal blood pressure less than 140/90     Environmental allergies     Osteopenia     Aortic aneurysm (H)     PAD (peripheral artery disease) (H)     Anemia     Advance Care Planning     Right foot pain     Past Surgical History:   Procedure Laterality Date     BYPASS GRAFT AORTOFEMORAL N/A 6/28/2018    Procedure: BYPASS GRAFT AORTOFEMORAL;  AORTOFEMORAL BYPASS;  Surgeon: Ulisses Meraz MD;  Location: [facility] OR     CATARACT IOL, RT/LT       HYSTERECTOMY TOTAL ABDOMINAL  1966     MAMMOPLASTY AUGMENTATION       VAGOTOMY, PYLOROPLASTY, COMBINED         Social History   Substance Use Topics     Smoking status: Former Smoker     Smokeless tobacco: Never Used      Alcohol use 0.0 oz/week     0 Standard drinks or equivalent per week      Comment: rare     Family History   Problem Relation Age of Onset     Osteoporosis Mother      Type 2 Diabetes Son            Reviewed and updated as needed this visit by clinical staff       Reviewed and updated as needed this visit by Provider         ROS:  Constitutional, HEENT, cardiovascular, pulmonary, gi and gu systems are negative, except as otherwise noted.    OBJECTIVE:     /60 (BP Location: Left arm, Patient Position: Chair, Cuff Size: Adult Regular)  Pulse 74  Temp 98.3  F (36.8  C) (Tympanic)  Wt 131 lb (59.4 kg)  BMI 21.14 kg/m2  Body mass index is 21.14 kg/(m^2).  GENERAL: healthy, alert and no distress  RESP: lungs clear to auscultation - no rales, rhonchi or wheezes  CV: regular rate and rhythm, normal S1 S2, no S3 or S4, no murmur, click or rub, no peripheral edema and peripheral pulses strong  MS: Right foot is in a boot    Diagnostic Test Results:  none     ASSESSMENT/PLAN:       1. PAD (peripheral artery disease) (H)  S/o right sided aortofemoral bypass grafting with resultant foot drop and paresthesias. No longer wants to be on gabapentin for pain but continues to take oxycodone daily. She is due for lipids and a hepatic function panel after starting Lipitor 3 months ago. She will return next week for fasting labs.  - atorvastatin (LIPITOR) 20 MG tablet; Take 1 tablet (20 mg) by mouth every evening  Dispense: 90 tablet; Refill: 3  - Lipid panel reflex to direct LDL Fasting; Future  - Hepatic panel; Future    2. Numbness of right foot  No longer renewing gabapentin    3. Acute post-operative pain  - oxyCODONE IR (ROXICODONE) 5 MG tablet; Take 1-2 tablets (5-10 mg) by mouth every 6 hours as needed for severe pain  Dispense: 30 tablet; Refill: 0    4. Benign essential hypertension  - lisinopril (PRINIVIL/ZESTRIL) 20 MG tablet; Take 1 tablet (20 mg) by mouth daily  Dispense: 90 tablet; Refill: 1    5. Need for  prophylactic vaccination and inoculation against influenza  - FLU VACCINE, INCREASED ANTIGEN, PRESV FREE, AGE 65+ [75745]  - Vaccine Administration, Initial [82733]    Follow up in 6 months after returning from [place].    Katie Chery MD  [facility]

## 2018-09-14 NOTE — MR AVS SNAPSHOT
After Visit Summary   9/14/2018    Trish Wu    MRN: [id number]           Patient Information     Date Of Birth          1945        Visit Information        Provider Department      9/14/2018 12:20 PM Katie Chery MD [facility]        Today's Diagnoses     PAD (peripheral artery disease) (H)    -  1    Numbness of right foot        Acute post-operative pain        Benign essential hypertension        Need for prophylactic vaccination and inoculation against influenza           Follow-ups after your visit        Follow-up notes from your care team     Return in about 6 months (around 3/14/2019) for Lab Work - Fasting, Medication Follow up.      Your next 10 appointments already scheduled     Sep 17, 2018 10:15 AM CDT   LAB with EC LAB   [facility] ([facility])    [address] [place] [id number] [phone]           OUTSIDE LABS: Please include name of facility and Physician that is requesting outside labs be drawn.  Please indicate if labs are fasting or non-fasting on appt notes.  Be as specific as you can on which labs are being drawn.              Who to contact     If you have questions or need follow up information about today's clinic visit or your schedule please contact [facility] directly at [phone].  Normal or non-critical lab and imaging results will be communicated to you by MyChart, letter or phone within 4 business days after the clinic has received the results. If you do not hear from us within 7 days, please contact the clinic through MyChart or phone. If you have a critical or abnormal lab result, we will notify you by phone as soon as possible.  Submit refill requests through [employer] or call your pharmacy and they will forward the refill request to us. Please allow 3 business days for your refill to be completed.          Additional Information About Your Visit         Reflektion Information     [employer] gives you secure access to your electronic health record. If you see a primary care provider, you can also send messages to your care team and make appointments. If you have questions, please call your primary care clinic.  If you do not have a primary care provider, please call [phone] and they will assist you.        Care EveryWhere ID     This is your Care EveryWhere ID. This could be used by other organizations to access your [facility] medical records  [id number]        Your Vitals Were     Pulse Temperature BMI (Body Mass Index)             74 98.3  F (36.8  C) (Tympanic) 21.14 kg/m2          Blood Pressure from Last 3 Encounters:   09/14/18 120/60   08/03/18 109/73   07/27/18 98/60    Weight from Last 3 Encounters:   09/14/18 131 lb (59.4 kg)   08/03/18 128 lb (58.1 kg)   07/27/18 128 lb (58.1 kg)              We Performed the Following     FLU VACCINE, INCREASED ANTIGEN, PRESV FREE, AGE 65+ [10526]     Vaccine Administration, Initial [53308]          Today's Medication Changes          These changes are accurate as of 9/14/18 11:59 PM.  If you have any questions, ask your nurse or doctor.               These medicines have changed or have updated prescriptions.        Dose/Directions    lisinopril 20 MG tablet   Commonly known as:  PRINIVIL/ZESTRIL   This may have changed:    - how much to take  - how to take this  - when to take this   Used for:  Benign essential hypertension   Changed by:  Katie Chery MD        Dose:  20 mg   Take 1 tablet (20 mg) by mouth daily   Quantity:  90 tablet   Refills:  1            Where to get your medicines      These medications were sent to [facility] [place] - [address]  [address] [facility], [place] [address]     Phone:  [phone]     atorvastatin 20 MG tablet    lisinopril 20 MG tablet         Some of these will need a paper prescription and others can be bought over  the counter.  Ask your nurse if you have questions.     Bring a paper prescription for each of these medications     oxyCODONE IR 5 MG tablet               Information about OPIOIDS     PRESCRIPTION OPIOIDS: WHAT YOU NEED TO KNOW   We gave you an opioid (narcotic) pain medicine. It is important to manage your pain, but opioids are not always the best choice. You should first try all the other options your care team gave you. Take this medicine for as short a time (and as few doses) as possible.    Some activities can increase your pain, such as bandage changes or therapy sessions. It may help to take your pain medicine 30 to 60 minutes before these activities. Reduce your stress by getting enough sleep, working on hobbies you enjoy and practicing relaxation or meditation. Talk to your care team about ways to manage your pain beyond prescription opioids.    These medicines have risks:    DO NOT drive when on new or higher doses of pain medicine. These medicines can affect your alertness and reaction times, and you could be arrested for driving under the influence (DUI). If you need to use opioids long-term, talk to your care team about driving.    DO NOT operate heavy machinery    DO NOT do any other dangerous activities while taking these medicines.    DO NOT drink any alcohol while taking these medicines.     If the opioid prescribed includes acetaminophen, DO NOT take with any other medicines that contain acetaminophen. Read all labels carefully. Look for the word  acetaminophen  or  Tylenol.  Ask your pharmacist if you have questions or are unsure.    You can get addicted to pain medicines, especially if you have a history of addiction (chemical, alcohol or substance dependence). Talk to your care team about ways to reduce this risk.    All opioids tend to cause constipation. Drink plenty of water and eat foods that have a lot of fiber, such as fruits, vegetables, prune juice, apple juice and high-fiber cereal.  Take a laxative (Miralax, milk of magnesia, Colace, Senna) if you don t move your bowels at least every other day. Other side effects include upset stomach, sleepiness, dizziness, throwing up, tolerance (needing more of the medicine to have the same effect), physical dependence and slowed breathing.    Store your pills in a secure place, locked if possible. We will not replace any lost or stolen medicine. If you don t finish your medicine, please throw away (dispose) as directed by your pharmacist. The [place] Pollution [employer] has more information about safe disposal: https://www.pca.[facility].mn.us/living-green/managing-unwanted-medications         Primary Care Provider Office Phone # Fax #    Darline Childs -[phone]       [address] [place] [id number]        Equal Access to Services     JESSICA FLORENCE : Hadii aad ku hadasho Soevie, waaxda luqadaha, qaybta kaalmada adeshaniceyada, lizeth brito . So [facility] [phone].    ATENCIÓN: Si habla español, tiene a valencia disposición servicios gratuitos de asistencia lingüística. [place] al [phone].    We comply with applicable federal civil rights laws and [place] laws. We do not discriminate on the basis of race, color, national origin, age, disability, sex, sexual orientation, or gender identity.            Thank you!     Thank you for choosing [facility]  for your care. Our goal is always to provide you with excellent care. Hearing back from our patients is one way we can continue to improve our services. Please take a few minutes to complete the written survey that you may receive in the mail after your visit with us. Thank you!             Your Updated Medication List - Protect others around you: Learn how to safely use, store and throw away your medicines at www.disposemymeds.org.          This list is accurate as of 9/14/18 11:59 PM.  Always use your most recent med list.                    Brand Name Dispense Instructions for use Diagnosis    ASPIRIN EC PO      Take 81 mg by mouth daily        atorvastatin 20 MG tablet    LIPITOR    90 tablet    Take 1 tablet (20 mg) by mouth every evening    PAD (peripheral artery disease) (H)       Calcium Carb-Cholecalciferol 500-400 MG-UNIT Tabs    calcium 500 +D     Take 1 tablet by mouth 2 times daily    Osteopenia, unspecified location       gabapentin 300 MG capsule    NEURONTIN    90 capsule    Take 1 capsule (300 mg) by mouth 3 times daily    Numbness of right foot       IRON SUPPLEMENT PO      Take 1 tablet by mouth 2 times daily        lisinopril 20 MG tablet    PRINIVIL/ZESTRIL    90 tablet    Take 1 tablet (20 mg) by mouth daily    Benign essential hypertension       order for DME     1 Units    Equipment being ordered: Walker () Treatment Diagnosis: Abdominal Aortic Aneurysm Repair    Abdominal aortic aneurysm (AAA) without rupture (H), Acute post-operative pain       oxyCODONE IR 5 MG tablet    ROXICODONE    30 tablet    Take 1-2 tablets (5-10 mg) by mouth every 6 hours as needed for severe pain    Acute post-operative pain       pantoprazole 40 MG EC tablet    PROTONIX    60 tablet    Take 1 tablet (40 mg) by mouth every morning    Gastroesophageal reflux disease with esophagitis       polyethylene glycol Packet    MIRALAX/GLYCOLAX     Take 1 packet by mouth daily        potassium chloride SA 20 MEQ CR tablet    KLOR-CON    30 tablet    Take 1 tablet (20 mEq) by mouth daily    Hypokalemia       senna-docusate 8.6-50 MG per tablet    EQ SENNA-S    100 tablet    Take 1 tablet by mouth daily    Constipation, unspecified constipation type

## 2018-09-17 DIAGNOSIS — I73.9 PAD (PERIPHERAL ARTERY DISEASE) (H): ICD-10-CM

## 2018-09-17 LAB
ALBUMIN SERPL-MCNC: 3.4 G/DL (ref 3.4–5)
ALP SERPL-CCNC: 100 U/L (ref 40–150)
ALT SERPL W P-5'-P-CCNC: 12 U/L (ref 0–50)
AST SERPL W P-5'-P-CCNC: 11 U/L (ref 0–45)
BILIRUB DIRECT SERPL-MCNC: <0.1 MG/DL (ref 0–0.2)
BILIRUB SERPL-MCNC: 0.2 MG/DL (ref 0.2–1.3)
CHOLEST SERPL-MCNC: 108 MG/DL
HDLC SERPL-MCNC: 61 MG/DL
LDLC SERPL CALC-MCNC: 31 MG/DL
NONHDLC SERPL-MCNC: 47 MG/DL
PROT SERPL-MCNC: 7.6 G/DL (ref 6.8–8.8)
TRIGL SERPL-MCNC: 81 MG/DL

## 2018-09-17 PROCEDURE — 80061 LIPID PANEL: CPT | Performed by: INTERNAL MEDICINE

## 2018-09-17 PROCEDURE — 80076 HEPATIC FUNCTION PANEL: CPT | Performed by: INTERNAL MEDICINE

## 2018-09-17 PROCEDURE — 36415 COLL VENOUS BLD VENIPUNCTURE: CPT | Performed by: INTERNAL MEDICINE

## 2018-12-04 NOTE — PROGRESS NOTES
PAD Discharge Summary    Reason for therapy discharge:    Patient/family request discontinuation of services.    Progress towards therapy goal(s). See goals on Care Plan in [facility] electronic health record for goal details.  Goals not met.  Barriers to achieving goals:   discharge on same date as initial evaluation.    Therapy recommendation(s):    Continue home exercise program.

## 2018-12-04 NOTE — ADDENDUM NOTE
Encounter addended by: Christopher Sinclair EP on: 12/4/2018  8:35 AM<BR>     Actions taken: Sign clinical note, Episode resolved

## 2019-03-19 DIAGNOSIS — I71.40 ABDOMINAL AORTIC ANEURYSM (AAA) WITHOUT RUPTURE (H): Primary | ICD-10-CM

## 2019-05-30 DIAGNOSIS — I71.40 ABDOMINAL AORTIC ANEURYSM (AAA) WITHOUT RUPTURE (H): ICD-10-CM

## 2019-05-30 DIAGNOSIS — I71.9 AORTIC ANEURYSM (H): Primary | ICD-10-CM

## 2019-08-28 PROBLEM — I73.9 PAD (PERIPHERAL ARTERY DISEASE) (H): Status: RESOLVED | Noted: 2018-05-11 | Resolved: 2019-08-28

## 2019-08-28 PROBLEM — M79.671 RIGHT FOOT PAIN: Status: RESOLVED | Noted: 2018-08-10 | Resolved: 2019-08-28

## 2019-08-28 NOTE — PROGRESS NOTES
DISCHARGE SUMMARY    Trish Wu was seen 3 times for evaluation and treatment.  Patient did not return for further treatment and current status is unknown.  Due to short treatment duration, no objective or functional changes were made.  Please see goal flow sheet from episode noted date below and initial evaluation for further information.  Patient is discharged from therapy and therapy episode is resolved as of 08/28/19.      No linked episodes

## 2020-03-10 ENCOUNTER — HEALTH MAINTENANCE LETTER (OUTPATIENT)
Age: 75
End: 2020-03-10

## 2020-12-02 NOTE — NURSING NOTE
"Chief Complaint   Patient presents with     Musculoskeletal Problem     L posterior hip and lower back pain       Initial /78 (BP Location: Left arm, Patient Position: Chair, Cuff Size: Adult Regular)  Ht 5' 6\" (1.676 m)  Wt 140 lb (63.5 kg)  BMI 22.6 kg/m2 Estimated body mass index is 22.6 kg/(m^2) as calculated from the following:    Height as of this encounter: 5' 6\" (1.676 m).    Weight as of this encounter: 140 lb (63.5 kg).  Medication Reconciliation: complete     Fuentes Garcia, ATC      " See message

## 2020-12-27 ENCOUNTER — HEALTH MAINTENANCE LETTER (OUTPATIENT)
Age: 75
End: 2020-12-27

## 2021-04-24 ENCOUNTER — HEALTH MAINTENANCE LETTER (OUTPATIENT)
Age: 76
End: 2021-04-24

## 2021-10-04 NOTE — LETTER
"DEPARTMENT OF HEALTH AND HUMAN SERVICES  CENTERS FOR MEDICARE & MEDICAID SERVICES    PLAN/UPDATED PLAN OF PROGRESS FOR OUTPATIENT REHABILITATION    PATIENTS NAME:  Trish Wu     : 1945    PROVIDER NUMBER:    [id number]    [facility]    PR[facility] NAME: [place] [facility] PH[facility]ICAL[facility]AP[facility]    MEDICAL RECORD NUMBER: [id number]     START OF CARE DATE:  SOC Date: 17   TYPE:  PT    PRIMARY/TREATMENT DIAGNOSIS: (Pertinent Medical Diagnosis)     Lumbago  Hip pain, left    VISITS FROM START OF CARE:  Rxs Used: 1     Subjective:    Patient is a 72 year old female presenting with rehab back hpi.   Trish Wu is a 72 year old female with a lumbar condition.  Condition occurred with:  Degenerative joint disease and insidious onset.  Condition occurred: for unknown reasons.  This is a chronic condition  Patient is referred with a 5 year hx of left LBP and L hip/buttock pain. PMH includes several falls and lumbar compression fracture. She notes gradually worsening pain and decreased mobility over the past several months. She tends to be better when sitting and at rest and notes decreased ability to walk. She is limited to about one block and prolonged standing produces numbness in L thigh. Transitions from sit to stand and rolling over in bed increase pain. She reports a hx of her back \"going out\". Recent x-rays and an MRI of L hip revealed superior labral tearing. MD 5/15/17.   Patient reports pain:  Lumbar spine left.  Radiates to:  Gluteals left and thigh left.    and reported as 1/10.  Associated symptoms:  Loss of strength and loss of motion/stiffness. Pain is worse during the day.  Symptoms are exacerbated by bending, walking and standing and relieved by rest.  Since onset symptoms are gradually worsening.  Special tests:  MRI and x-ray.      General health as reported by patient is good.  Pertinent medical history includes:  History of fractures, osteoarthritis and " high blood pressure (abdominal aneurysm).      Current medications:  None as reported by the patient.        Barriers include:  None as reported by the patient.  Red flags:  None as reported by the patient.                      PATIENTS NAME:  Trish Wu: 1945        Objective:       Hip Evaluation  HIP AROM:  AROM:    Left Hip:     Normal    Right Hip:   Normal  Hip PROM:  Hip PROM:  Left Hip:    Normal  Right Hip:  Normal  Hip Strength:    Flexion:   Left: 5/5   Pain:  Right: 5/5   Pain:              Extension:  Left: 4/5  Pain:Right: 4/5    Pain:    Abduction:  Left: 3/5    +   Pain:Right: 3/5    Pain:  Adduction:  Left: 5/5    Pain:Right: 5/5   Pain:  Hip Special Testing:    Left hip positive for the following special tests:  Fadir/Labrum  Hip Palpation:  Not Assessed   Functional Testing:  not assessed  Irene Lumbar Evaluation  Posture:  Sitting: fair  Standing: good  Lordosis: Reduced  Lateral Shift: no  Correction of Posture: no effect  Movement Loss:  Flexion (Flex): mod and pain  Extension (EXT): min  Side Glide R (SG R): min and pain  Side [place] L (SG L): min and pain  Test Movements:  LOI: During: no effect  After: no effect  Mechanical Response: no effect  Repeat LOI: During: produces  After: no worse  Mechanical Response: no effect  EIL: During: produces  After: no worse  Mechanical Response: no effect  Repeat EIL: During: produces  After: no effect  Mechanical Response: IncROM  Conclusion lumbar: lumbar DDD.  Principle of Treatment:  Flexion: LOI, FISitting, prayer stretch x 10/ 3-4 x daily  Other: core strengthening                                               PATIENTS NAME:  Trish Wu YOB: 1945    Assessment/Plan:      Patient is a 72 year old female with lumbar and left side hip complaints.    Patient has the following significant findings with corresponding treatment plan.                Diagnosis 1:  LBP, L hip pain  Pain -  hot/cold therapy, self management,  education, directional preference exercise and home program  Decreased strength - therapeutic exercise, therapeutic activities and home program  Impaired balance - neuro re-education, therapeutic activities and home program  Impaired gait - gait training and home program  Impaired muscle performance - neuro re-education and home program  Decreased function - therapeutic activities and home program    Therapy Evaluation Codes:   1) History comprised of:   Personal factors that impact the plan of care:      Time since onset of symptoms.    Comorbidity factors that impact the plan of care are:      High blood pressure.     Medications impacting care: None.  2) Examination of Body Systems comprised of:   Body structures and functions that impact the plan of care:      Hip and Lumbar spine.   Activity limitations that impact the plan of care are:      Bending, Lifting, Stairs, Standing, Walking and Sleeping.  3) Clinical presentation characteristics are:   Stable/Uncomplicated.  4) Decision-Making    Low complexity using standardized patient assessment instrument and/or measureable assessment of functional outcome.  Cumulative Therapy Evaluation is: Low complexity.  Previous and current functional limitations:  (See Goal Flow Sheet for this information)    Short term and Long term goals: (See Goal Flow Sheet for this information)   Communication ability:  Patient appears to be able to clearly communicate and understand verbal and written communication and follow directions correctly.  Treatment Explanation - The following has been discussed with the patient:   RX ordered/plan of care  Anticipated outcomes  Possible risks and side effects  This patient would benefit from PT intervention to resume normal activities.   Rehab potential is good.  Frequency:  2 X week, once daily  Duration:  for 3 weeks  Discharge Plan:  Achieve all LTG.  Independent in home treatment program.  Reach maximal therapeutic benefit.      PATIENTS  "NAME:  Trish Wu     : 1945              Caregiver Signature/Credentials _____________________________ Date ________       Treating Provider: Ta Bullock PT     I have reviewed and certified the need for these services and plan of treatment while under my care.        PHYSICIAN'S SIGNATURE:   _________________________________________  Date___________   Kavin Miguel Eddy MD     Certification period:  Beginning of Cert date period: 17 to  End of Cert period date: 17     Functional Level Progress Report: Please see attached \"Goal Flow sheet for Functional level.\"    ____X____ Continue Services or       ________ DC Services                Service dates: From  SOC Date: 17 date to present                         " [place] Counseling: I discussed with the patient the risks of ixekizumab including but not limited to immunosuppression, serious infections, worsening of inflammatory bowel disease and drug reactions.  The patient understands that monitoring is required including a PPD at baseline and must alert us or the primary physician if symptoms of infection or other concerning signs are noted.

## 2021-10-09 ENCOUNTER — HEALTH MAINTENANCE LETTER (OUTPATIENT)
Age: 76
End: 2021-10-09

## 2022-05-16 ENCOUNTER — HEALTH MAINTENANCE LETTER (OUTPATIENT)
Age: 77
End: 2022-05-16

## 2022-09-11 ENCOUNTER — HEALTH MAINTENANCE LETTER (OUTPATIENT)
Age: 77
End: 2022-09-11

## 2023-06-03 ENCOUNTER — HEALTH MAINTENANCE LETTER (OUTPATIENT)
Age: 78
End: 2023-06-03

## 2024-06-17 PROBLEM — Z71.89 ADVANCED DIRECTIVES, COUNSELING/DISCUSSION: Status: RESOLVED | Noted: 2018-06-25 | Resolved: 2024-06-17

## (undated) DEVICE — SOL NACL 0.9% INJ 250ML BAG 2B1322Q

## (undated) DEVICE — SU PDS II 1 TP-1 54" Z879G

## (undated) DEVICE — CATH TRAY FOLEY 16FR W/URINE METER STATLOCK 902916

## (undated) DEVICE — SU PROLENE 6-0 C-1DA 30" M8706

## (undated) DEVICE — SU PROLENE 5-0 BBDA 36"  8580H

## (undated) DEVICE — SU UMBILICAL TAPE 36X.125" U12T

## (undated) DEVICE — COVER TABLE POLY 65X90" 8186

## (undated) DEVICE — SOL WATER IRRIG 1000ML BOTTLE 2F7114

## (undated) DEVICE — NDL 19GA 1.5"

## (undated) DEVICE — SU PROLENE 3-0 V-7DA 36" 8976H

## (undated) DEVICE — DRAPE IOBAN INCISE 36X23" 6651EZ

## (undated) DEVICE — SU SILK 2-0 TIE 24" SA75H

## (undated) DEVICE — SU VICRYL 2-0 CT-1 27" J339H

## (undated) DEVICE — SU VICRYL 1 CTX CR 8X18" J765D

## (undated) DEVICE — VESSEL LOOPS RED MAXI

## (undated) DEVICE — CLIP APPLIER 11" MED LIGACLIP MCM20

## (undated) DEVICE — SURGICEL HEMOSTAT 2X14" 1951

## (undated) DEVICE — DECANTER BAG 2002S

## (undated) DEVICE — SUCTION CANISTER MEDIVAC LINER 3000ML W/LID 65651-530

## (undated) DEVICE — VESSEL LOOPS YELLOW MINI 31145660

## (undated) DEVICE — SU VICRYL 2-0 CT 36" J357H

## (undated) DEVICE — SOL NACL 0.9% IRRIG 1000ML BOTTLE 07138-09

## (undated) DEVICE — SU SILK 3-0 TIE 24" SA74H

## (undated) DEVICE — LINEN TOWEL PACK X5 5464

## (undated) DEVICE — SPONGE LAP 18X18" X8435

## (undated) DEVICE — SYR 20ML LL W/O NDL 302830

## (undated) DEVICE — PACK AAA SBA15AAFS3

## (undated) DEVICE — STPL SKIN PROXIMATE 35 WIDE PMW35

## (undated) DEVICE — NDL BLUNT ADAPTOR 15GA

## (undated) DEVICE — SYR 01ML 25GA 5/8"

## (undated) DEVICE — ESU GROUND PAD UNIVERSAL W/O CORD

## (undated) DEVICE — PREP CHLORAPREP 26ML TINTED ORANGE  260815

## (undated) DEVICE — SU SILK 0 24" TIE SA76G

## (undated) DEVICE — SU PROLENE 4-0 V-7DA 36" 8975H

## (undated) RX ORDER — KETOROLAC TROMETHAMINE 15 MG/ML
INJECTION, SOLUTION INTRAMUSCULAR; INTRAVENOUS
Status: DISPENSED
Start: 2018-06-28

## (undated) RX ORDER — GLYCOPYRROLATE 0.2 MG/ML
INJECTION, SOLUTION INTRAMUSCULAR; INTRAVENOUS
Status: DISPENSED
Start: 2018-06-28

## (undated) RX ORDER — HYDROMORPHONE HYDROCHLORIDE 1 MG/ML
INJECTION, SOLUTION INTRAMUSCULAR; INTRAVENOUS; SUBCUTANEOUS
Status: DISPENSED
Start: 2018-06-28

## (undated) RX ORDER — FENTANYL CITRATE 50 UG/ML
INJECTION, SOLUTION INTRAMUSCULAR; INTRAVENOUS
Status: DISPENSED
Start: 2018-06-28

## (undated) RX ORDER — DEXAMETHASONE SODIUM PHOSPHATE 4 MG/ML
INJECTION, SOLUTION INTRA-ARTICULAR; INTRALESIONAL; INTRAMUSCULAR; INTRAVENOUS; SOFT TISSUE
Status: DISPENSED
Start: 2018-06-28

## (undated) RX ORDER — HEPARIN SODIUM 1000 [USP'U]/ML
INJECTION, SOLUTION INTRAVENOUS; SUBCUTANEOUS
Status: DISPENSED
Start: 2018-06-28

## (undated) RX ORDER — NEOSTIGMINE METHYLSULFATE 1 MG/ML
VIAL (ML) INJECTION
Status: DISPENSED
Start: 2018-06-28

## (undated) RX ORDER — CLINDAMYCIN PHOSPHATE 900 MG/50ML
INJECTION, SOLUTION INTRAVENOUS
Status: DISPENSED
Start: 2018-06-28

## (undated) RX ORDER — ONDANSETRON 2 MG/ML
INJECTION INTRAMUSCULAR; INTRAVENOUS
Status: DISPENSED
Start: 2018-06-28

## (undated) RX ORDER — PROPOFOL 10 MG/ML
INJECTION, EMULSION INTRAVENOUS
Status: DISPENSED
Start: 2018-06-28

## (undated) RX ORDER — VECURONIUM BROMIDE 1 MG/ML
INJECTION, POWDER, LYOPHILIZED, FOR SOLUTION INTRAVENOUS
Status: DISPENSED
Start: 2018-06-28

## (undated) RX ORDER — HYDROCODONE BITARTRATE AND ACETAMINOPHEN 5; 325 MG/1; MG/1
TABLET ORAL
Status: DISPENSED
Start: 2018-06-28

## (undated) RX ORDER — MEPERIDINE HYDROCHLORIDE 25 MG/ML
INJECTION INTRAMUSCULAR; INTRAVENOUS; SUBCUTANEOUS
Status: DISPENSED
Start: 2018-06-28

## (undated) RX ORDER — LIDOCAINE HYDROCHLORIDE 20 MG/ML
INJECTION, SOLUTION EPIDURAL; INFILTRATION; INTRACAUDAL; PERINEURAL
Status: DISPENSED
Start: 2018-06-28

## (undated) RX ORDER — FUROSEMIDE 10 MG/ML
INJECTION INTRAMUSCULAR; INTRAVENOUS
Status: DISPENSED
Start: 2018-06-28